# Patient Record
Sex: MALE | Race: WHITE | HISPANIC OR LATINO | Employment: FULL TIME | URBAN - METROPOLITAN AREA
[De-identification: names, ages, dates, MRNs, and addresses within clinical notes are randomized per-mention and may not be internally consistent; named-entity substitution may affect disease eponyms.]

---

## 2017-02-15 ENCOUNTER — HOSPITAL ENCOUNTER (OUTPATIENT)
Dept: LAB | Facility: MEDICAL CENTER | Age: 27
End: 2017-02-15
Attending: FAMILY MEDICINE
Payer: COMMERCIAL

## 2017-02-15 LAB
ALBUMIN SERPL BCP-MCNC: 4.3 G/DL (ref 3.2–4.9)
ALBUMIN/GLOB SERPL: 1.1 G/DL
ALP SERPL-CCNC: 128 U/L (ref 30–99)
ALT SERPL-CCNC: 166 U/L (ref 2–50)
ANION GAP SERPL CALC-SCNC: 11 MMOL/L (ref 0–11.9)
AST SERPL-CCNC: 255 U/L (ref 12–45)
BASOPHILS # BLD AUTO: 0.07 K/UL (ref 0–0.12)
BASOPHILS NFR BLD AUTO: 0.9 % (ref 0–1.8)
BILIRUB SERPL-MCNC: 1.2 MG/DL (ref 0.1–1.5)
BUN SERPL-MCNC: 5 MG/DL (ref 8–22)
CALCIUM SERPL-MCNC: 10 MG/DL (ref 8.5–10.5)
CHLORIDE SERPL-SCNC: 101 MMOL/L (ref 96–112)
CHOLEST SERPL-MCNC: 171 MG/DL (ref 100–199)
CO2 SERPL-SCNC: 26 MMOL/L (ref 20–33)
CREAT SERPL-MCNC: 0.55 MG/DL (ref 0.5–1.4)
CREAT UR-MCNC: 156.6 MG/DL
EOSINOPHIL # BLD: 0.17 K/UL (ref 0–0.51)
EOSINOPHIL NFR BLD AUTO: 2.1 % (ref 0–6.9)
ERYTHROCYTE [DISTWIDTH] IN BLOOD BY AUTOMATED COUNT: 49.3 FL (ref 35.9–50)
EST. AVERAGE GLUCOSE BLD GHB EST-MCNC: 312 MG/DL
GLOBULIN SER CALC-MCNC: 4 G/DL (ref 1.9–3.5)
GLUCOSE SERPL-MCNC: 185 MG/DL (ref 65–99)
HBA1C MFR BLD: 12.5 % (ref 0–5.6)
HCT VFR BLD AUTO: 48.4 % (ref 42–52)
HDLC SERPL-MCNC: 20 MG/DL
HGB BLD-MCNC: 15.9 G/DL (ref 14–18)
IMM GRANULOCYTES # BLD AUTO: 0.02 K/UL (ref 0–0.11)
IMM GRANULOCYTES NFR BLD AUTO: 0.3 % (ref 0–0.9)
LDLC SERPL CALC-MCNC: 104 MG/DL
LYMPHOCYTES # BLD: 2.86 K/UL (ref 1–4.8)
LYMPHOCYTES NFR BLD AUTO: 35.8 % (ref 22–41)
MCH RBC QN AUTO: 31.8 PG (ref 27–33)
MCHC RBC AUTO-ENTMCNC: 32.9 G/DL (ref 33.7–35.3)
MCV RBC AUTO: 96.8 FL (ref 81.4–97.8)
MICROALBUMIN UR-MCNC: 6 MG/DL
MICROALBUMIN/CREAT UR: 38 MG/G (ref 0–30)
MONOCYTES # BLD: 0.42 K/UL (ref 0–0.85)
MONOCYTES NFR BLD AUTO: 5.3 % (ref 0–13.4)
NEUTROPHILS # BLD: 4.45 K/UL (ref 1.82–7.42)
NEUTROPHILS NFR BLD AUTO: 55.6 % (ref 44–72)
NRBC # BLD AUTO: 0 K/UL
NRBC BLD-RTO: 0 /100 WBC
PLATELET # BLD AUTO: 180 K/UL (ref 164–446)
PMV BLD AUTO: 12.7 FL (ref 9–12.9)
POTASSIUM SERPL-SCNC: 4.2 MMOL/L (ref 3.6–5.5)
PROT SERPL-MCNC: 8.3 G/DL (ref 6–8.2)
RBC # BLD AUTO: 5 M/UL (ref 4.7–6.1)
SODIUM SERPL-SCNC: 138 MMOL/L (ref 135–145)
TRIGL SERPL-MCNC: 236 MG/DL (ref 0–149)
WBC # BLD AUTO: 8 K/UL (ref 4.8–10.8)

## 2017-02-15 PROCEDURE — 82570 ASSAY OF URINE CREATININE: CPT

## 2017-02-15 PROCEDURE — 82043 UR ALBUMIN QUANTITATIVE: CPT

## 2017-02-15 PROCEDURE — 80061 LIPID PANEL: CPT

## 2017-02-15 PROCEDURE — 85025 COMPLETE CBC W/AUTO DIFF WBC: CPT

## 2017-02-15 PROCEDURE — 80053 COMPREHEN METABOLIC PANEL: CPT

## 2017-02-15 PROCEDURE — 83036 HEMOGLOBIN GLYCOSYLATED A1C: CPT

## 2017-02-15 PROCEDURE — 36415 COLL VENOUS BLD VENIPUNCTURE: CPT

## 2017-08-29 ENCOUNTER — OFFICE VISIT (OUTPATIENT)
Dept: URGENT CARE | Facility: PHYSICIAN GROUP | Age: 27
End: 2017-08-29
Payer: COMMERCIAL

## 2017-08-29 VITALS
WEIGHT: 250 LBS | TEMPERATURE: 97.9 F | SYSTOLIC BLOOD PRESSURE: 168 MMHG | DIASTOLIC BLOOD PRESSURE: 94 MMHG | OXYGEN SATURATION: 98 % | RESPIRATION RATE: 18 BRPM | HEART RATE: 120 BPM | HEIGHT: 71 IN | BODY MASS INDEX: 35 KG/M2

## 2017-08-29 DIAGNOSIS — R00.0 SINUS TACHYCARDIA: ICD-10-CM

## 2017-08-29 DIAGNOSIS — G62.9 NEUROPATHY: ICD-10-CM

## 2017-08-29 DIAGNOSIS — I10 ESSENTIAL HYPERTENSION: ICD-10-CM

## 2017-08-29 DIAGNOSIS — E11.65 TYPE 2 DIABETES MELLITUS WITH HYPERGLYCEMIA, WITHOUT LONG-TERM CURRENT USE OF INSULIN (HCC): ICD-10-CM

## 2017-08-29 PROCEDURE — 99204 OFFICE O/P NEW MOD 45 MIN: CPT | Performed by: FAMILY MEDICINE

## 2017-08-29 RX ORDER — LISINOPRIL 10 MG/1
10 TABLET ORAL DAILY
Qty: 30 TAB | Refills: 0 | Status: SHIPPED | OUTPATIENT
Start: 2017-08-29 | End: 2018-03-09

## 2017-08-29 NOTE — LETTER
August 29, 2017         Patient: August Samuel   YOB: 1990   Date of Visit: 8/29/2017           To Whom it May Concern:    August Samuel was seen in my clinic on 8/29/2017.       Please excuse him for his absence 8/29-8/30.    If you have any questions or concerns, please don't hesitate to call.        Sincerely,           Venkata Saldana M.D.  Electronically Signed

## 2017-08-30 ENCOUNTER — HOSPITAL ENCOUNTER (OUTPATIENT)
Dept: LAB | Facility: MEDICAL CENTER | Age: 27
End: 2017-08-30
Attending: FAMILY MEDICINE
Payer: COMMERCIAL

## 2017-08-30 DIAGNOSIS — G62.9 NEUROPATHY: ICD-10-CM

## 2017-08-30 LAB
ALBUMIN SERPL BCP-MCNC: 3.1 G/DL (ref 3.2–4.9)
ALBUMIN/GLOB SERPL: 0.6 G/DL
ALP SERPL-CCNC: 179 U/L (ref 30–99)
ALT SERPL-CCNC: 29 U/L (ref 2–50)
ANION GAP SERPL CALC-SCNC: 10 MMOL/L (ref 0–11.9)
AST SERPL-CCNC: 161 U/L (ref 12–45)
BASOPHILS # BLD AUTO: 0.8 % (ref 0–1.8)
BASOPHILS # BLD: 0.09 K/UL (ref 0–0.12)
BILIRUB SERPL-MCNC: 3.7 MG/DL (ref 0.1–1.5)
BUN SERPL-MCNC: 3 MG/DL (ref 8–22)
CALCIUM SERPL-MCNC: 9 MG/DL (ref 8.5–10.5)
CHLORIDE SERPL-SCNC: 97 MMOL/L (ref 96–112)
CHOLEST SERPL-MCNC: 151 MG/DL (ref 100–199)
CO2 SERPL-SCNC: 27 MMOL/L (ref 20–33)
CREAT SERPL-MCNC: 0.49 MG/DL (ref 0.5–1.4)
EOSINOPHIL # BLD AUTO: 0.16 K/UL (ref 0–0.51)
EOSINOPHIL NFR BLD: 1.4 % (ref 0–6.9)
ERYTHROCYTE [DISTWIDTH] IN BLOOD BY AUTOMATED COUNT: 53.5 FL (ref 35.9–50)
EST. AVERAGE GLUCOSE BLD GHB EST-MCNC: 194 MG/DL
ETHANOL BLD-MCNC: 0.01 G/DL
GFR SERPL CREATININE-BSD FRML MDRD: >60 ML/MIN/1.73 M 2
GLOBULIN SER CALC-MCNC: 5.4 G/DL (ref 1.9–3.5)
GLUCOSE SERPL-MCNC: 139 MG/DL (ref 65–99)
HBA1C MFR BLD: 8.4 % (ref 0–5.6)
HCT VFR BLD AUTO: 39.7 % (ref 42–52)
HDLC SERPL-MCNC: 6 MG/DL
HGB BLD-MCNC: 13.5 G/DL (ref 14–18)
IMM GRANULOCYTES # BLD AUTO: 0.06 K/UL (ref 0–0.11)
IMM GRANULOCYTES NFR BLD AUTO: 0.5 % (ref 0–0.9)
LDLC SERPL CALC-MCNC: 86 MG/DL
LYMPHOCYTES # BLD AUTO: 3.13 K/UL (ref 1–4.8)
LYMPHOCYTES NFR BLD: 26.6 % (ref 22–41)
MCH RBC QN AUTO: 32.8 PG (ref 27–33)
MCHC RBC AUTO-ENTMCNC: 34 G/DL (ref 33.7–35.3)
MCV RBC AUTO: 96.4 FL (ref 81.4–97.8)
MONOCYTES # BLD AUTO: 0.64 K/UL (ref 0–0.85)
MONOCYTES NFR BLD AUTO: 5.4 % (ref 0–13.4)
NEUTROPHILS # BLD AUTO: 7.69 K/UL (ref 1.82–7.42)
NEUTROPHILS NFR BLD: 65.3 % (ref 44–72)
NRBC # BLD AUTO: 0 K/UL
NRBC BLD AUTO-RTO: 0 /100 WBC
PLATELET # BLD AUTO: 176 K/UL (ref 164–446)
PMV BLD AUTO: 11.9 FL (ref 9–12.9)
POTASSIUM SERPL-SCNC: 3.3 MMOL/L (ref 3.6–5.5)
PROT SERPL-MCNC: 8.5 G/DL (ref 6–8.2)
RBC # BLD AUTO: 4.12 M/UL (ref 4.7–6.1)
SODIUM SERPL-SCNC: 134 MMOL/L (ref 135–145)
TRIGL SERPL-MCNC: 294 MG/DL (ref 0–149)
WBC # BLD AUTO: 11.8 K/UL (ref 4.8–10.8)

## 2017-08-30 PROCEDURE — 83519 RIA NONANTIBODY: CPT

## 2017-08-30 PROCEDURE — 36415 COLL VENOUS BLD VENIPUNCTURE: CPT

## 2017-08-30 PROCEDURE — 83036 HEMOGLOBIN GLYCOSYLATED A1C: CPT

## 2017-08-30 PROCEDURE — 80307 DRUG TEST PRSMV CHEM ANLYZR: CPT

## 2017-08-30 PROCEDURE — 85025 COMPLETE CBC W/AUTO DIFF WBC: CPT

## 2017-08-30 PROCEDURE — 80061 LIPID PANEL: CPT

## 2017-08-30 PROCEDURE — 83516 IMMUNOASSAY NONANTIBODY: CPT

## 2017-08-30 PROCEDURE — 80053 COMPREHEN METABOLIC PANEL: CPT

## 2017-08-30 NOTE — PROGRESS NOTES
"Chief Complaint   Patient presents with   • Leg Problem     x1wk has been feeling numbness in both legs/bursts of sharp pain         HPI     has several nonspecific complaints:        C/o severe fatigue.    States that he sleeps ok.          C/o intermittent numbness and tingling in both legs and feet x 3 wks.   Feels that legs sometimes, \"lack power\".    Gets occasional pain down front of thighs, but this is mild.         Past med hx:   DMII - but not compliant with meds.       Social hx - denies tobacco.   + 5-8 drinks daily x3 yrs.   Denies drugs      Family history was reviewed and not pertinent         Blood pressure (!) 168/94, pulse (!) 120, temperature 36.6 °C (97.9 °F), resp. rate 18, height 1.803 m (5' 11\"), weight 113.4 kg (250 lb), SpO2 98 %.        Gen: Alert and oriented, No apparent distress.   HEENT - PERRLA, EOMI  Neuro - alert and oriented x3. CN 2-12 grossly intact.  Lungs - CTA. No wheezes, rhonchi or rales.  Heart - regular rate and rhythm without murmur.  Abdomen - soft and non-tender, bowel sounds active x4.  Musculoskeletal - No lower extremity edema noted.  Naida's sign negative bilaterally  Psych - normal speech and affect  Ext: No clubbing, cyanosis, edema.       EKG interpretation -  sinus tachy -120.  No ST or QRS morphology changes. TWI in inf leads.   QT interval is prolonged. Axis is positive. No signs of ischemia.      A/P:      1.  ETOH abuse - likely cause for his neuropathy.  Advised to cut down.   He does not want professional help.       2.  DMII - will restart on metformin .   A1c 8.4    3.  HTN - will start on lisinopril .   F/u one week.     5.  EKG shows sinus tach.   Will refer to cardiology    Pt is refusing to go to ED.  "

## 2017-08-30 NOTE — PROGRESS NOTES
"Subjective:      August Samuel is a 27 y.o. male who presents with Leg Problem (x1wk has been feeling numbness in both legs/bursts of sharp pain)            HPI    ROS       Objective:     BP (!) 168/94   Pulse (!) 120   Temp 36.6 °C (97.9 °F)   Resp 18   Ht 1.803 m (5' 11\")   Wt 113.4 kg (250 lb)   SpO2 98%   BMI 34.87 kg/m²      Physical Exam            Assessment/Plan:     There are no diagnoses linked to this encounter.      "

## 2017-09-08 ENCOUNTER — OFFICE VISIT (OUTPATIENT)
Dept: CARDIOLOGY | Facility: MEDICAL CENTER | Age: 27
End: 2017-09-08
Payer: COMMERCIAL

## 2017-09-08 VITALS
WEIGHT: 228 LBS | HEART RATE: 116 BPM | HEIGHT: 71 IN | OXYGEN SATURATION: 96 % | DIASTOLIC BLOOD PRESSURE: 100 MMHG | SYSTOLIC BLOOD PRESSURE: 130 MMHG | BODY MASS INDEX: 31.92 KG/M2

## 2017-09-08 DIAGNOSIS — E78.5 OTHER AND UNSPECIFIED HYPERLIPIDEMIA: ICD-10-CM

## 2017-09-08 DIAGNOSIS — E11.65 UNCONTROLLED TYPE 2 DIABETES MELLITUS WITH COMPLICATION, UNSPECIFIED LONG TERM INSULIN USE STATUS: ICD-10-CM

## 2017-09-08 DIAGNOSIS — R00.0 SINUS TACHYCARDIA: ICD-10-CM

## 2017-09-08 DIAGNOSIS — E11.8 UNCONTROLLED TYPE 2 DIABETES MELLITUS WITH COMPLICATION, UNSPECIFIED LONG TERM INSULIN USE STATUS: ICD-10-CM

## 2017-09-08 DIAGNOSIS — G62.1 ALCOHOL-INDUCED POLYNEUROPATHY (HCC): ICD-10-CM

## 2017-09-08 LAB — EKG IMPRESSION: NORMAL

## 2017-09-08 PROCEDURE — 99204 OFFICE O/P NEW MOD 45 MIN: CPT | Performed by: INTERNAL MEDICINE

## 2017-09-08 PROCEDURE — 93000 ELECTROCARDIOGRAM COMPLETE: CPT | Performed by: INTERNAL MEDICINE

## 2017-09-08 RX ORDER — ATORVASTATIN CALCIUM 20 MG/1
20 TABLET, FILM COATED ORAL DAILY
Qty: 30 TAB | Refills: 3 | Status: SHIPPED | OUTPATIENT
Start: 2017-09-08 | End: 2019-03-12

## 2017-09-08 NOTE — PROGRESS NOTES
"Arrhythmia Clinic Note (New patient)     DOS: 9/8/2017    Referring physician: Tyrell Barnes MD    Chief complaint/Reason for consult: \"tachycardia\"    HPI:  Patient is a 28 yo WM with history of recently diagnosed T2DM who admittedly has been non-compliant with his metformin who was referred to cardiology for sinus tachycardia incidentally seen on 12 lead EKG. He denies any symptoms of palpitations/chest discomfort/RATLIFF/edema/PND/orthopnea. He otherwise mainly complains of peripheral neuropathy that has been bothering his feet. He does admit to drinking heavily, his drink of choice being canned \"long islands\" of which he admits to drinking 6 x per day.     ROS (+ highlighted in red):  Constitutional: Fevers/chills/fatigue/weightloss  HEENT: Blurry vision/eye pain/sore throat/hearing loss  Respiratory: Shortness of breath/cough  Cardiovascular: Chest pain/palpitations/edema/orthopnea/syncope  GI: Nausea/vomitting/diarrhea  MSK: Arthralgias/myagias/muscle weakness  Skin: Rash/sores  Neurological: Numbness/tremors/vertigo  Endocrine: Excessive thirst/polyuria/cold intolerance/heat intolerance  Psych: Depression/anxiety    PMhx:  T2DM  EtOH dependency  Medication non-compliance    No past surgical history on file.    Social History     Social History   • Marital status: Single     Spouse name: N/A   • Number of children: N/A   • Years of education: N/A     Occupational History   • Not on file.     Social History Main Topics   • Smoking status: Never Smoker   • Smokeless tobacco: Never Used   • Alcohol use Not on file   • Drug use: Unknown   • Sexual activity: Not on file     Other Topics Concern   • Not on file     Social History Narrative   • No narrative on file       History reviewed. No pertinent family history.    Not on File    Current Outpatient Prescriptions   Medication Sig Dispense Refill   • metformin (GLUCOPHAGE) 500 MG Tab Take 2 Tabs by mouth 2 times a day, with meals. 60 Tab 0   • lisinopril (PRINIVIL) " "10 MG Tab Take 1 Tab by mouth every day. 30 Tab 0     No current facility-administered medications for this visit.        Physical Exam:  Vitals:    09/08/17 0755   BP: 130/100   Pulse: (!) 116   SpO2: 96%   Weight: 103.4 kg (228 lb)   Height: 1.803 m (5' 10.98\")     General appearance: NAD, conversant   Eyes: anicteric sclerae, moist conjunctivae; no lid-lag; PERRLA  HENT: Atraumatic; oropharynx clear with moist mucous membranes and no mucosal ulcerations; normal hard and soft palate  Neck: Trachea midline; FROM, supple, no thyromegaly or lymphadenopathy  Lungs: CTA, with normal respiratory effort and no intercostal retractions  CV: RRR, no MRGs, no JVD   Abdomen: Soft, non-tender; no masses or HSM  Extremities: No peripheral edema or extremity lymphadenopathy  Skin: Normal temperature, turgor and texture; no rash, ulcers or subcutaneous nodules  Psych: Appropriate affect, alert and oriented to person, place and time    Data:  Labs reviewed    Outside FM records reviewed    EKG interpreted by me:  Sinus tachycardia    Impression/Plan:  1. Sinus tachycardia  EKG   2. Uncontrolled type 2 diabetes mellitus with complication, unspecified long term insulin use status (CMS-[facility])     3. Alcohol-induced polyneuropathy (CMS-[facility])     4. Other and unspecified hyperlipidemia       -His EKG shows sinus tachycardia, no indication of arrhythmia or concern for ischemia  -There is no further work up needed for a sinus rhythm  -Given his diabetes, reasonable to start moderate dose statin for him  -I counseled him on weight loss and alcohol cessation  -He can f/u PRN    Sushma Reynolds MD    "

## 2017-09-21 LAB
ACHR BIND AB SER-SCNC: 0.2 NMOL/L (ref 0–0.4)
ACHR BLOCK AB/ACHR TOTAL SFR SER: 3 % (ref 0–26)

## 2017-10-09 ENCOUNTER — OFFICE VISIT (OUTPATIENT)
Dept: URGENT CARE | Facility: PHYSICIAN GROUP | Age: 27
End: 2017-10-09
Payer: COMMERCIAL

## 2017-10-09 ENCOUNTER — HOSPITAL ENCOUNTER (INPATIENT)
Facility: MEDICAL CENTER | Age: 27
LOS: 3 days | DRG: 432 | End: 2017-10-12
Attending: EMERGENCY MEDICINE | Admitting: HOSPITALIST
Payer: COMMERCIAL

## 2017-10-09 ENCOUNTER — RESOLUTE PROFESSIONAL BILLING HOSPITAL PROF FEE (OUTPATIENT)
Dept: HOSPITALIST | Facility: MEDICAL CENTER | Age: 27
End: 2017-10-09
Payer: COMMERCIAL

## 2017-10-09 VITALS
OXYGEN SATURATION: 97 % | HEIGHT: 71 IN | SYSTOLIC BLOOD PRESSURE: 140 MMHG | RESPIRATION RATE: 16 BRPM | TEMPERATURE: 98.8 F | BODY MASS INDEX: 31.92 KG/M2 | WEIGHT: 228 LBS | HEART RATE: 123 BPM | DIASTOLIC BLOOD PRESSURE: 82 MMHG

## 2017-10-09 DIAGNOSIS — R10.84 GENERALIZED ABDOMINAL PAIN: ICD-10-CM

## 2017-10-09 DIAGNOSIS — E87.1 HYPONATREMIA: ICD-10-CM

## 2017-10-09 DIAGNOSIS — K92.0 HEMATEMESIS WITH NAUSEA: ICD-10-CM

## 2017-10-09 DIAGNOSIS — K92.2 GASTROINTESTINAL HEMORRHAGE, UNSPECIFIED GASTROINTESTINAL HEMORRHAGE TYPE: ICD-10-CM

## 2017-10-09 DIAGNOSIS — Z78.9 ALCOHOL USE: ICD-10-CM

## 2017-10-09 DIAGNOSIS — K92.1 MELENA: ICD-10-CM

## 2017-10-09 LAB
ALBUMIN SERPL BCP-MCNC: 3.4 G/DL (ref 3.2–4.9)
ALBUMIN/GLOB SERPL: 0.6 G/DL
ALP SERPL-CCNC: 198 U/L (ref 30–99)
ALT SERPL-CCNC: 45 U/L (ref 2–50)
ANION GAP SERPL CALC-SCNC: 12 MMOL/L (ref 0–11.9)
AST SERPL-CCNC: 161 U/L (ref 12–45)
BASOPHILS # BLD AUTO: 0.7 % (ref 0–1.8)
BASOPHILS # BLD: 0.09 K/UL (ref 0–0.12)
BILIRUB SERPL-MCNC: 2.9 MG/DL (ref 0.1–1.5)
BUN SERPL-MCNC: 9 MG/DL (ref 8–22)
CALCIUM SERPL-MCNC: 9.6 MG/DL (ref 8.5–10.5)
CHLORIDE SERPL-SCNC: 98 MMOL/L (ref 96–112)
CO2 SERPL-SCNC: 24 MMOL/L (ref 20–33)
CREAT SERPL-MCNC: 0.51 MG/DL (ref 0.5–1.4)
EKG IMPRESSION: NORMAL
EOSINOPHIL # BLD AUTO: 0.13 K/UL (ref 0–0.51)
EOSINOPHIL NFR BLD: 1 % (ref 0–6.9)
ERYTHROCYTE [DISTWIDTH] IN BLOOD BY AUTOMATED COUNT: 57.9 FL (ref 35.9–50)
GFR SERPL CREATININE-BSD FRML MDRD: >60 ML/MIN/1.73 M 2
GLOBULIN SER CALC-MCNC: 5.6 G/DL (ref 1.9–3.5)
GLUCOSE BLD-MCNC: 133 MG/DL (ref 65–99)
GLUCOSE SERPL-MCNC: 138 MG/DL (ref 65–99)
HCT VFR BLD AUTO: 40.5 % (ref 42–52)
HGB BLD-MCNC: 13.8 G/DL (ref 14–18)
IMM GRANULOCYTES # BLD AUTO: 0.05 K/UL (ref 0–0.11)
IMM GRANULOCYTES NFR BLD AUTO: 0.4 % (ref 0–0.9)
LIPASE SERPL-CCNC: 16 U/L (ref 11–82)
LYMPHOCYTES # BLD AUTO: 3.23 K/UL (ref 1–4.8)
LYMPHOCYTES NFR BLD: 24.4 % (ref 22–41)
MCH RBC QN AUTO: 32.1 PG (ref 27–33)
MCHC RBC AUTO-ENTMCNC: 34.1 G/DL (ref 33.7–35.3)
MCV RBC AUTO: 94.2 FL (ref 81.4–97.8)
MONOCYTES # BLD AUTO: 0.58 K/UL (ref 0–0.85)
MONOCYTES NFR BLD AUTO: 4.4 % (ref 0–13.4)
NEUTROPHILS # BLD AUTO: 9.17 K/UL (ref 1.82–7.42)
NEUTROPHILS NFR BLD: 69.1 % (ref 44–72)
NRBC # BLD AUTO: 0 K/UL
NRBC BLD AUTO-RTO: 0 /100 WBC
PLATELET # BLD AUTO: 222 K/UL (ref 164–446)
PMV BLD AUTO: 11.2 FL (ref 9–12.9)
POTASSIUM SERPL-SCNC: 3.8 MMOL/L (ref 3.6–5.5)
PROT SERPL-MCNC: 9 G/DL (ref 6–8.2)
RBC # BLD AUTO: 4.3 M/UL (ref 4.7–6.1)
SODIUM SERPL-SCNC: 134 MMOL/L (ref 135–145)
WBC # BLD AUTO: 13.3 K/UL (ref 4.8–10.8)

## 2017-10-09 PROCEDURE — 99285 EMERGENCY DEPT VISIT HI MDM: CPT

## 2017-10-09 PROCEDURE — 96365 THER/PROPH/DIAG IV INF INIT: CPT

## 2017-10-09 PROCEDURE — 94760 N-INVAS EAR/PLS OXIMETRY 1: CPT

## 2017-10-09 PROCEDURE — 96367 TX/PROPH/DG ADDL SEQ IV INF: CPT

## 2017-10-09 PROCEDURE — 36415 COLL VENOUS BLD VENIPUNCTURE: CPT

## 2017-10-09 PROCEDURE — 80053 COMPREHEN METABOLIC PANEL: CPT

## 2017-10-09 PROCEDURE — 96368 THER/DIAG CONCURRENT INF: CPT

## 2017-10-09 PROCEDURE — 99214 OFFICE O/P EST MOD 30 MIN: CPT | Performed by: PHYSICIAN ASSISTANT

## 2017-10-09 PROCEDURE — 700101 HCHG RX REV CODE 250: Performed by: INTERNAL MEDICINE

## 2017-10-09 PROCEDURE — C9113 INJ PANTOPRAZOLE SODIUM, VIA: HCPCS | Performed by: EMERGENCY MEDICINE

## 2017-10-09 PROCEDURE — 83690 ASSAY OF LIPASE: CPT

## 2017-10-09 PROCEDURE — 700111 HCHG RX REV CODE 636 W/ 250 OVERRIDE (IP): Performed by: EMERGENCY MEDICINE

## 2017-10-09 PROCEDURE — 93005 ELECTROCARDIOGRAM TRACING: CPT | Performed by: STUDENT IN AN ORGANIZED HEALTH CARE EDUCATION/TRAINING PROGRAM

## 2017-10-09 PROCEDURE — 93010 ELECTROCARDIOGRAM REPORT: CPT | Performed by: INTERNAL MEDICINE

## 2017-10-09 PROCEDURE — 96375 TX/PRO/DX INJ NEW DRUG ADDON: CPT

## 2017-10-09 PROCEDURE — A9270 NON-COVERED ITEM OR SERVICE: HCPCS | Performed by: STUDENT IN AN ORGANIZED HEALTH CARE EDUCATION/TRAINING PROGRAM

## 2017-10-09 PROCEDURE — 770020 HCHG ROOM/CARE - TELE (206)

## 2017-10-09 PROCEDURE — 700111 HCHG RX REV CODE 636 W/ 250 OVERRIDE (IP): Performed by: INTERNAL MEDICINE

## 2017-10-09 PROCEDURE — 700102 HCHG RX REV CODE 250 W/ 637 OVERRIDE(OP): Performed by: STUDENT IN AN ORGANIZED HEALTH CARE EDUCATION/TRAINING PROGRAM

## 2017-10-09 PROCEDURE — 82962 GLUCOSE BLOOD TEST: CPT

## 2017-10-09 PROCEDURE — 96361 HYDRATE IV INFUSION ADD-ON: CPT

## 2017-10-09 PROCEDURE — 700105 HCHG RX REV CODE 258: Performed by: STUDENT IN AN ORGANIZED HEALTH CARE EDUCATION/TRAINING PROGRAM

## 2017-10-09 PROCEDURE — 700105 HCHG RX REV CODE 258: Performed by: INTERNAL MEDICINE

## 2017-10-09 PROCEDURE — 700105 HCHG RX REV CODE 258: Performed by: EMERGENCY MEDICINE

## 2017-10-09 PROCEDURE — 85025 COMPLETE CBC W/AUTO DIFF WBC: CPT

## 2017-10-09 RX ORDER — OCTREOTIDE ACETATE 100 UG/ML
50 INJECTION, SOLUTION INTRAVENOUS; SUBCUTANEOUS ONCE
Status: DISCONTINUED | OUTPATIENT
Start: 2017-10-09 | End: 2017-10-09

## 2017-10-09 RX ORDER — ATORVASTATIN CALCIUM 20 MG/1
20 TABLET, FILM COATED ORAL DAILY
Status: DISCONTINUED | OUTPATIENT
Start: 2017-10-09 | End: 2017-10-12 | Stop reason: HOSPADM

## 2017-10-09 RX ORDER — DEXTROSE MONOHYDRATE 25 G/50ML
25 INJECTION, SOLUTION INTRAVENOUS
Status: CANCELLED | OUTPATIENT
Start: 2017-10-09

## 2017-10-09 RX ORDER — PANTOPRAZOLE SODIUM 40 MG/10ML
40 INJECTION, POWDER, LYOPHILIZED, FOR SOLUTION INTRAVENOUS 2 TIMES DAILY
Status: DISCONTINUED | OUTPATIENT
Start: 2017-10-10 | End: 2017-10-10 | Stop reason: ALTCHOICE

## 2017-10-09 RX ORDER — SODIUM CHLORIDE 9 MG/ML
INJECTION, SOLUTION INTRAVENOUS CONTINUOUS
Status: DISCONTINUED | OUTPATIENT
Start: 2017-10-09 | End: 2017-10-11

## 2017-10-09 RX ORDER — ONDANSETRON 2 MG/ML
4 INJECTION INTRAMUSCULAR; INTRAVENOUS ONCE
Status: COMPLETED | OUTPATIENT
Start: 2017-10-09 | End: 2017-10-09

## 2017-10-09 RX ORDER — SODIUM CHLORIDE 9 MG/ML
1000 INJECTION, SOLUTION INTRAVENOUS ONCE
Status: COMPLETED | OUTPATIENT
Start: 2017-10-09 | End: 2017-10-09

## 2017-10-09 RX ORDER — ONDANSETRON 4 MG/1
4 TABLET, ORALLY DISINTEGRATING ORAL ONCE
Status: COMPLETED | OUTPATIENT
Start: 2017-10-09 | End: 2017-10-09

## 2017-10-09 RX ORDER — PANTOPRAZOLE SODIUM 40 MG/10ML
80 INJECTION, POWDER, LYOPHILIZED, FOR SOLUTION INTRAVENOUS ONCE
Status: COMPLETED | OUTPATIENT
Start: 2017-10-09 | End: 2017-10-09

## 2017-10-09 RX ORDER — LISINOPRIL 10 MG/1
10 TABLET ORAL DAILY
Status: DISCONTINUED | OUTPATIENT
Start: 2017-10-09 | End: 2017-10-10

## 2017-10-09 RX ADMIN — ATORVASTATIN CALCIUM 20 MG: 20 TABLET, FILM COATED ORAL at 22:20

## 2017-10-09 RX ADMIN — LISINOPRIL 10 MG: 10 TABLET ORAL at 22:19

## 2017-10-09 RX ADMIN — PANTOPRAZOLE SODIUM 80 MG: 40 INJECTION, POWDER, FOR SOLUTION INTRAVENOUS at 19:38

## 2017-10-09 RX ADMIN — ONDANSETRON 4 MG: 2 INJECTION, SOLUTION INTRAMUSCULAR; INTRAVENOUS at 18:51

## 2017-10-09 RX ADMIN — FOLIC ACID 1 MG: 5 INJECTION, SOLUTION INTRAMUSCULAR; INTRAVENOUS; SUBCUTANEOUS at 20:56

## 2017-10-09 RX ADMIN — THIAMINE HYDROCHLORIDE 100 MG: 100 INJECTION, SOLUTION INTRAMUSCULAR; INTRAVENOUS at 20:57

## 2017-10-09 RX ADMIN — SODIUM CHLORIDE: 9 INJECTION, SOLUTION INTRAVENOUS at 22:21

## 2017-10-09 RX ADMIN — SODIUM CHLORIDE 8 MG/HR: 9 INJECTION, SOLUTION INTRAVENOUS at 19:39

## 2017-10-09 RX ADMIN — ONDANSETRON 4 MG: 4 TABLET, ORALLY DISINTEGRATING ORAL at 15:19

## 2017-10-09 RX ADMIN — SODIUM CHLORIDE 1000 ML: 9 INJECTION, SOLUTION INTRAVENOUS at 18:44

## 2017-10-09 ASSESSMENT — PAIN SCALES - GENERAL
PAINLEVEL_OUTOF10: 0
PAINLEVEL_OUTOF10: 0
PAINLEVEL_OUTOF10: 4
PAINLEVEL: 4=SLIGHT-MODERATE PAIN

## 2017-10-09 ASSESSMENT — ENCOUNTER SYMPTOMS
DIARRHEA: 1
NECK PAIN: 0
ABDOMINAL PAIN: 1
HEADACHES: 0
WHEEZING: 0
SORE THROAT: 0
CONSTIPATION: 0
VOMITING: 1
MYALGIAS: 0
EYE DISCHARGE: 0
CHILLS: 1
TINGLING: 0
ARTHRALGIAS: 0
COUGH: 0
HEARTBURN: 0
EYE REDNESS: 0
HEMATOCHEZIA: 0
DIZZINESS: 1
BELCHING: 0
BLOOD IN STOOL: 1
FEVER: 0
ANOREXIA: 1
SENSORY CHANGE: 1
NAUSEA: 1

## 2017-10-09 ASSESSMENT — COPD QUESTIONNAIRES
DO YOU EVER COUGH UP ANY MUCUS OR PHLEGM?: YES, A FEW DAYS A WEEK OR MONTH
DURING THE PAST 4 WEEKS HOW MUCH DID YOU FEEL SHORT OF BREATH: SOME OF THE TIME
HAVE YOU SMOKED AT LEAST 100 CIGARETTES IN YOUR ENTIRE LIFE: NO/DON'T KNOW
COPD SCREENING SCORE: 2

## 2017-10-09 ASSESSMENT — PATIENT HEALTH QUESTIONNAIRE - PHQ9
7. TROUBLE CONCENTRATING ON THINGS, SUCH AS READING THE NEWSPAPER OR WATCHING TELEVISION: SEVERAL DAYS
6. FEELING BAD ABOUT YOURSELF - OR THAT YOU ARE A FAILURE OR HAVE LET YOURSELF OR YOUR FAMILY DOWN: NEARLY EVERY DAY
8. MOVING OR SPEAKING SO SLOWLY THAT OTHER PEOPLE COULD HAVE NOTICED. OR THE OPPOSITE, BEING SO FIGETY OR RESTLESS THAT YOU HAVE BEEN MOVING AROUND A LOT MORE THAN USUAL: NOT AT ALL
2. FEELING DOWN, DEPRESSED, IRRITABLE, OR HOPELESS: SEVERAL DAYS
4. FEELING TIRED OR HAVING LITTLE ENERGY: MORE THAN HALF THE DAYS
9. THOUGHTS THAT YOU WOULD BE BETTER OFF DEAD, OR OF HURTING YOURSELF: NOT AT ALL
3. TROUBLE FALLING OR STAYING ASLEEP OR SLEEPING TOO MUCH: NOT AT ALL
SUM OF ALL RESPONSES TO PHQ9 QUESTIONS 1 AND 2: 3
5. POOR APPETITE OR OVEREATING: NEARLY EVERY DAY
SUM OF ALL RESPONSES TO PHQ QUESTIONS 1-9: 12
1. LITTLE INTEREST OR PLEASURE IN DOING THINGS: MORE THAN HALF THE DAYS

## 2017-10-09 ASSESSMENT — LIFESTYLE VARIABLES
VISUAL DISTURBANCES: NOT PRESENT
ALCOHOL_USE: YES
EVER_SMOKED: NEVER
TREMOR: TREMOR NOT VISIBLE BUT CAN BE FELT, FINGERTIP TO FINGERTIP
TOTAL SCORE: 4
AUDITORY DISTURBANCES: NOT PRESENT
EVER FELT BAD OR GUILTY ABOUT YOUR DRINKING: YES
DOES PATIENT WANT TO STOP DRINKING: YES
DOES PATIENT WANT TO TALK TO SOMEONE ABOUT QUITTING: YES
CONSUMPTION TOTAL: POSITIVE
HAVE PEOPLE ANNOYED YOU BY CRITICIZING YOUR DRINKING: YES
TOTAL SCORE: 4
NAUSEA AND VOMITING: NO NAUSEA AND NO VOMITING
ANXIETY: NO ANXIETY (AT EASE)
PAROXYSMAL SWEATS: NO SWEAT VISIBLE
AVERAGE NUMBER OF DAYS PER WEEK YOU HAVE A DRINK CONTAINING ALCOHOL: 7
HAVE YOU EVER FELT YOU SHOULD CUT DOWN ON YOUR DRINKING: YES
HEADACHE, FULLNESS IN HEAD: NOT PRESENT
ORIENTATION AND CLOUDING OF SENSORIUM: ORIENTED AND CAN DO SERIAL ADDITIONS
EVER HAD A DRINK FIRST THING IN THE MORNING TO STEADY YOUR NERVES TO GET RID OF A HANGOVER: YES
ON A TYPICAL DAY WHEN YOU DRINK ALCOHOL HOW MANY DRINKS DO YOU HAVE: 6
HOW MANY TIMES IN THE PAST YEAR HAVE YOU HAD 5 OR MORE DRINKS IN A DAY: 365
EVER_SMOKED: NEVER
TOTAL SCORE: 4
TOTAL SCORE: 1
AGITATION: NORMAL ACTIVITY

## 2017-10-09 NOTE — ED NOTES
.  Chief Complaint   Patient presents with   • N/V   • Blood in Sputum   • Diarrhea     tar like stool x 3 today   ambulated to triage with above c/c fsbs 133. Informed of triage process. Awaiting room in triage lobby. Asked to return to triage desk with any questions or concerns.

## 2017-10-09 NOTE — PROGRESS NOTES
"Subjective:      August Samuel is a 27 y.o. male who presents with Abdominal Pain (x 6 hours, vomiting blood, black stool)            PT is 28 y/o male who presents to [facility] with abd. Pain, vomiting and diarrhea since yesterday. Pt. Reports that his abd. Pain started yesterday more \"all over\" and he \"just didn't feel well.   He reports that he think awoke this morning with worse abd. Pain and had since had 4 episodes of bloody vomit, and one episodes of very black diarrhea.   He reports intermittent episodes of light-headedness. He was concerned as he thought that his Lisinopril was a blood thinner- and took some tereza seltzer and thought this was causing his symptoms.   Pt. Does drink significant alcohol- last use was 2 days ago- when he had \"3 long island teas\".       Abdominal Pain   This is a new problem. The current episode started yesterday. The onset quality is gradual. The problem occurs constantly. The problem has been gradually worsening. The pain is located in the LLQ and epigastric region. The pain is at a severity of 5/10. The pain is moderate. The quality of the pain is aching. The abdominal pain does not radiate. Associated symptoms include anorexia, diarrhea, melena, nausea and vomiting. Pertinent negatives include no arthralgias, belching, constipation, dysuria, fever, headaches, hematochezia, hematuria or myalgias. Nothing aggravates the pain. The pain is relieved by nothing. Treatments tried: Tereza Shae last night. The treatment provided no relief.       Review of Systems   Constitutional: Positive for chills and malaise/fatigue. Negative for fever.   HENT: Negative for congestion, ear discharge and sore throat.    Eyes: Negative for discharge and redness.   Respiratory: Negative for cough and wheezing.    Cardiovascular: Negative for chest pain and leg swelling.   Gastrointestinal: Positive for abdominal pain, anorexia, blood in stool, diarrhea, melena, nausea and vomiting. Negative for " "constipation, heartburn and hematochezia.   Genitourinary: Negative for dysuria, hematuria and urgency.   Musculoskeletal: Negative for arthralgias, myalgias and neck pain.   Skin: Negative for itching and rash.   Neurological: Positive for dizziness and sensory change. Negative for tingling and headaches.          Objective:     /82   Pulse (!) 123   Temp 37.1 °C (98.8 °F)   Resp 16   Ht 1.803 m (5' 11\")   Wt 103.4 kg (228 lb)   SpO2 97%   BMI 31.80 kg/m²    PMH:  has no past medical history on file.  MEDS:   Current Outpatient Prescriptions:   •  atorvastatin (LIPITOR) 20 MG Tab, Take 1 Tab by mouth every day., Disp: 30 Tab, Rfl: 3  •  metformin (GLUCOPHAGE) 500 MG Tab, Take 2 Tabs by mouth 2 times a day, with meals., Disp: 60 Tab, Rfl: 0  •  lisinopril (PRINIVIL) 10 MG Tab, Take 1 Tab by mouth every day., Disp: 30 Tab, Rfl: 0    Current Facility-Administered Medications:   •  ondansetron (ZOFRAN ODT) dispertab 4 mg, 4 mg, Oral, Once, Chris Alexander P.AAlejandro-BARBER  ALLERGIES: No Known Allergies  SURGHX: No past surgical history on file.  SOCHX:  reports that he has never smoked. He has never used smokeless tobacco.  FH: Family history was reviewed, no pertinent findings to report    Physical Exam   Constitutional: He is oriented to person, place, and time. He appears well-developed and well-nourished.   HENT:   Head: Normocephalic and atraumatic.   Right Ear: External ear normal.   Left Ear: External ear normal.   Nose: Nose normal.   Mouth/Throat: Oropharynx is clear and moist. No oropharyngeal exudate.   Eyes: EOM are normal. Pupils are equal, round, and reactive to light.   Neck: Normal range of motion. Neck supple.   Cardiovascular: Tachycardia present.    No murmur heard.  Pulmonary/Chest: Effort normal and breath sounds normal. No respiratory distress.   Abdominal: Soft. He exhibits no distension, no ascites and no mass. Bowel sounds are increased. There is tenderness in the epigastric area and left " lower quadrant. There is no rebound, no guarding and no tenderness at McBurney's point. No hernia.       Minimal mid-epigastric tenderness- without rigidity.   Hyperactive bowel sounds. Neg. Heel tap.    Musculoskeletal: He exhibits no edema.   Lymphadenopathy:     He has no cervical adenopathy.   Neurological: He is alert and oriented to person, place, and time.   Skin: Skin is warm. No rash noted. No pallor.   Good skin turgor.      Psychiatric: He has a normal mood and affect. His behavior is normal.   Vitals reviewed.              Assessment/Plan:     1. Hematemesis with nausea  - ondansetron (ZOFRAN ODT) dispertab 4 mg; Take 1 Tab by mouth Once.    2. Melena  3. Alcohol use  4. Generalized abdominal pain    Patient showed me pictures of his vomiting- gross blood noted. Pt. With noted tenderness to epigastrum and LLQ- appears bleed is more upper GI - without BRBPR.   Possibly due to long term alcohol use, pancreatitis, ulcers, ect.   At this time patient is hemodynamically stable- known hx. Of tachycardia- however needs timely work up.   Pt. Was instructed to go to the ER- he declined transport- he called his brother who will drive him to [facility]- I spoke with Dr. Loya regarding this patient and he was gracious to see him.   Pt. Was stable during the duration of his care today. '

## 2017-10-10 PROBLEM — E80.6 HYPERBILIRUBINEMIA: Status: ACTIVE | Noted: 2017-10-10

## 2017-10-10 PROBLEM — R00.0 TACHYCARDIA: Status: ACTIVE | Noted: 2017-10-10

## 2017-10-10 PROBLEM — K76.6 PORTAL HYPERTENSION (HCC): Status: ACTIVE | Noted: 2017-10-10

## 2017-10-10 PROBLEM — I85.01 BLEEDING ESOPHAGEAL VARICES (HCC): Status: ACTIVE | Noted: 2017-10-10

## 2017-10-10 PROBLEM — K70.30 ALCOHOLIC CIRRHOSIS (HCC): Status: ACTIVE | Noted: 2017-10-10

## 2017-10-10 PROBLEM — R79.89 LIVER FUNCTION TEST ABNORMALITY: Status: ACTIVE | Noted: 2017-10-10

## 2017-10-10 PROBLEM — E11.9 DIABETES MELLITUS (HCC): Status: ACTIVE | Noted: 2017-10-10

## 2017-10-10 LAB
ALBUMIN SERPL BCP-MCNC: 2.6 G/DL (ref 3.2–4.9)
ALBUMIN/GLOB SERPL: 0.6 G/DL
ALP SERPL-CCNC: 155 U/L (ref 30–99)
ALT SERPL-CCNC: 33 U/L (ref 2–50)
ANION GAP SERPL CALC-SCNC: 9 MMOL/L (ref 0–11.9)
APPEARANCE UR: CLEAR
AST SERPL-CCNC: 131 U/L (ref 12–45)
BASOPHILS # BLD AUTO: 0.8 % (ref 0–1.8)
BASOPHILS # BLD: 0.09 K/UL (ref 0–0.12)
BILIRUB SERPL-MCNC: 3 MG/DL (ref 0.1–1.5)
BILIRUB UR QL STRIP.AUTO: ABNORMAL
BUN SERPL-MCNC: 9 MG/DL (ref 8–22)
CALCIUM SERPL-MCNC: 8.8 MG/DL (ref 8.5–10.5)
CHLORIDE SERPL-SCNC: 102 MMOL/L (ref 96–112)
CO2 SERPL-SCNC: 24 MMOL/L (ref 20–33)
COLOR UR: ABNORMAL
CREAT SERPL-MCNC: 0.52 MG/DL (ref 0.5–1.4)
EOSINOPHIL # BLD AUTO: 0.2 K/UL (ref 0–0.51)
EOSINOPHIL NFR BLD: 1.8 % (ref 0–6.9)
ERYTHROCYTE [DISTWIDTH] IN BLOOD BY AUTOMATED COUNT: 57.8 FL (ref 35.9–50)
FOLATE SERPL-MCNC: >23.2 NG/ML
GFR SERPL CREATININE-BSD FRML MDRD: >60 ML/MIN/1.73 M 2
GLOBULIN SER CALC-MCNC: 4.6 G/DL (ref 1.9–3.5)
GLUCOSE BLD-MCNC: 116 MG/DL (ref 65–99)
GLUCOSE SERPL-MCNC: 118 MG/DL (ref 65–99)
GLUCOSE UR STRIP.AUTO-MCNC: NEGATIVE MG/DL
HAV IGM SERPL QL IA: NEGATIVE
HBV CORE IGM SER QL: NEGATIVE
HBV SURFACE AG SER QL: NEGATIVE
HCT VFR BLD AUTO: 34.1 % (ref 42–52)
HCV AB SER QL: NEGATIVE
HGB BLD-MCNC: 11.7 G/DL (ref 14–18)
IMM GRANULOCYTES # BLD AUTO: 0.05 K/UL (ref 0–0.11)
IMM GRANULOCYTES NFR BLD AUTO: 0.5 % (ref 0–0.9)
INR PPP: 1.45 (ref 0.87–1.13)
KETONES UR STRIP.AUTO-MCNC: NEGATIVE MG/DL
LEUKOCYTE ESTERASE UR QL STRIP.AUTO: NEGATIVE
LYMPHOCYTES # BLD AUTO: 3.35 K/UL (ref 1–4.8)
LYMPHOCYTES NFR BLD: 30.5 % (ref 22–41)
MAGNESIUM SERPL-MCNC: 1.5 MG/DL (ref 1.5–2.5)
MAGNESIUM SERPL-MCNC: 1.7 MG/DL (ref 1.5–2.5)
MCH RBC QN AUTO: 32.3 PG (ref 27–33)
MCHC RBC AUTO-ENTMCNC: 34.3 G/DL (ref 33.7–35.3)
MCV RBC AUTO: 94.2 FL (ref 81.4–97.8)
MICRO URNS: ABNORMAL
MONOCYTES # BLD AUTO: 0.6 K/UL (ref 0–0.85)
MONOCYTES NFR BLD AUTO: 5.5 % (ref 0–13.4)
NEUTROPHILS # BLD AUTO: 6.7 K/UL (ref 1.82–7.42)
NEUTROPHILS NFR BLD: 60.9 % (ref 44–72)
NITRITE UR QL STRIP.AUTO: NEGATIVE
NRBC # BLD AUTO: 0 K/UL
NRBC BLD AUTO-RTO: 0 /100 WBC
PH UR STRIP.AUTO: >=9 [PH]
PLATELET # BLD AUTO: 171 K/UL (ref 164–446)
PMV BLD AUTO: 10.9 FL (ref 9–12.9)
POTASSIUM SERPL-SCNC: 3.9 MMOL/L (ref 3.6–5.5)
PROT SERPL-MCNC: 7.2 G/DL (ref 6–8.2)
PROT UR QL STRIP: NEGATIVE MG/DL
PROTHROMBIN TIME: 18.1 SEC (ref 12–14.6)
RBC # BLD AUTO: 3.62 M/UL (ref 4.7–6.1)
RBC UR QL AUTO: NEGATIVE
SODIUM SERPL-SCNC: 135 MMOL/L (ref 135–145)
SP GR UR STRIP.AUTO: 1.01
TSH SERPL DL<=0.005 MIU/L-ACNC: 3.18 UIU/ML (ref 0.3–3.7)
UROBILINOGEN UR STRIP.AUTO-MCNC: 2 MG/DL
VIT B12 SERPL-MCNC: 861 PG/ML (ref 211–911)
WBC # BLD AUTO: 11 K/UL (ref 4.8–10.8)

## 2017-10-10 PROCEDURE — 160036 HCHG PACU - EA ADDL 30 MINS PHASE I: Performed by: INTERNAL MEDICINE

## 2017-10-10 PROCEDURE — 500066 HCHG BITE BLOCK, ECT: Performed by: INTERNAL MEDICINE

## 2017-10-10 PROCEDURE — 81003 URINALYSIS AUTO W/O SCOPE: CPT

## 2017-10-10 PROCEDURE — 84443 ASSAY THYROID STIM HORMONE: CPT

## 2017-10-10 PROCEDURE — 90471 IMMUNIZATION ADMIN: CPT

## 2017-10-10 PROCEDURE — 83735 ASSAY OF MAGNESIUM: CPT | Mod: 91

## 2017-10-10 PROCEDURE — 80074 ACUTE HEPATITIS PANEL: CPT

## 2017-10-10 PROCEDURE — 700111 HCHG RX REV CODE 636 W/ 250 OVERRIDE (IP): Performed by: INTERNAL MEDICINE

## 2017-10-10 PROCEDURE — 82962 GLUCOSE BLOOD TEST: CPT

## 2017-10-10 PROCEDURE — 87040 BLOOD CULTURE FOR BACTERIA: CPT

## 2017-10-10 PROCEDURE — 82746 ASSAY OF FOLIC ACID SERUM: CPT

## 2017-10-10 PROCEDURE — 85025 COMPLETE CBC W/AUTO DIFF WBC: CPT

## 2017-10-10 PROCEDURE — 160002 HCHG RECOVERY MINUTES (STAT): Performed by: INTERNAL MEDICINE

## 2017-10-10 PROCEDURE — 700111 HCHG RX REV CODE 636 W/ 250 OVERRIDE (IP)

## 2017-10-10 PROCEDURE — 80053 COMPREHEN METABOLIC PANEL: CPT

## 2017-10-10 PROCEDURE — 36415 COLL VENOUS BLD VENIPUNCTURE: CPT

## 2017-10-10 PROCEDURE — 700105 HCHG RX REV CODE 258: Performed by: STUDENT IN AN ORGANIZED HEALTH CARE EDUCATION/TRAINING PROGRAM

## 2017-10-10 PROCEDURE — A9270 NON-COVERED ITEM OR SERVICE: HCPCS | Performed by: INTERNAL MEDICINE

## 2017-10-10 PROCEDURE — 160203 HCHG ENDO MINUTES - 1ST 30 MINS LEVEL 4: Performed by: INTERNAL MEDICINE

## 2017-10-10 PROCEDURE — 160009 HCHG ANES TIME/MIN: Performed by: INTERNAL MEDICINE

## 2017-10-10 PROCEDURE — 3E0234Z INTRODUCTION OF SERUM, TOXOID AND VACCINE INTO MUSCLE, PERCUTANEOUS APPROACH: ICD-10-PCS | Performed by: HOSPITALIST

## 2017-10-10 PROCEDURE — 160035 HCHG PACU - 1ST 60 MINS PHASE I: Performed by: INTERNAL MEDICINE

## 2017-10-10 PROCEDURE — C9113 INJ PANTOPRAZOLE SODIUM, VIA: HCPCS | Performed by: STUDENT IN AN ORGANIZED HEALTH CARE EDUCATION/TRAINING PROGRAM

## 2017-10-10 PROCEDURE — 82607 VITAMIN B-12: CPT

## 2017-10-10 PROCEDURE — 700111 HCHG RX REV CODE 636 W/ 250 OVERRIDE (IP): Performed by: HOSPITALIST

## 2017-10-10 PROCEDURE — 99223 1ST HOSP IP/OBS HIGH 75: CPT | Mod: GC | Performed by: HOSPITALIST

## 2017-10-10 PROCEDURE — 700111 HCHG RX REV CODE 636 W/ 250 OVERRIDE (IP): Performed by: STUDENT IN AN ORGANIZED HEALTH CARE EDUCATION/TRAINING PROGRAM

## 2017-10-10 PROCEDURE — 700105 HCHG RX REV CODE 258: Performed by: INTERNAL MEDICINE

## 2017-10-10 PROCEDURE — 700101 HCHG RX REV CODE 250: Performed by: INTERNAL MEDICINE

## 2017-10-10 PROCEDURE — 160048 HCHG OR STATISTICAL LEVEL 1-5: Performed by: INTERNAL MEDICINE

## 2017-10-10 PROCEDURE — 700101 HCHG RX REV CODE 250

## 2017-10-10 PROCEDURE — 0W3P8ZZ CONTROL BLEEDING IN GASTROINTESTINAL TRACT, VIA NATURAL OR ARTIFICIAL OPENING ENDOSCOPIC: ICD-10-PCS | Performed by: INTERNAL MEDICINE

## 2017-10-10 PROCEDURE — 770020 HCHG ROOM/CARE - TELE (206)

## 2017-10-10 PROCEDURE — 90686 IIV4 VACC NO PRSV 0.5 ML IM: CPT | Performed by: HOSPITALIST

## 2017-10-10 PROCEDURE — 700102 HCHG RX REV CODE 250 W/ 637 OVERRIDE(OP): Performed by: INTERNAL MEDICINE

## 2017-10-10 PROCEDURE — 502240 HCHG MISC OR SUPPLY RC 0272: Performed by: INTERNAL MEDICINE

## 2017-10-10 PROCEDURE — 85610 PROTHROMBIN TIME: CPT

## 2017-10-10 RX ORDER — SUCRALFATE ORAL 1 G/10ML
1 SUSPENSION ORAL
Status: DISCONTINUED | OUTPATIENT
Start: 2017-10-10 | End: 2017-10-12 | Stop reason: HOSPADM

## 2017-10-10 RX ORDER — OMEPRAZOLE 20 MG/1
40 CAPSULE, DELAYED RELEASE ORAL DAILY
Status: DISCONTINUED | OUTPATIENT
Start: 2017-10-10 | End: 2017-10-12 | Stop reason: HOSPADM

## 2017-10-10 RX ORDER — NADOLOL 20 MG/1
20 TABLET ORAL
Status: DISCONTINUED | OUTPATIENT
Start: 2017-10-10 | End: 2017-10-12 | Stop reason: HOSPADM

## 2017-10-10 RX ORDER — SODIUM CHLORIDE 9 MG/ML
INJECTION, SOLUTION INTRAVENOUS
Status: ACTIVE
Start: 2017-10-10 | End: 2017-10-11

## 2017-10-10 RX ADMIN — PANTOPRAZOLE SODIUM 40 MG: 40 INJECTION, POWDER, FOR SOLUTION INTRAVENOUS at 08:38

## 2017-10-10 RX ADMIN — SODIUM CHLORIDE: 9 INJECTION, SOLUTION INTRAVENOUS at 22:41

## 2017-10-10 RX ADMIN — SUCRALFATE 1 G: 1 SUSPENSION ORAL at 12:21

## 2017-10-10 RX ADMIN — SODIUM CHLORIDE: 9 INJECTION, SOLUTION INTRAVENOUS at 12:21

## 2017-10-10 RX ADMIN — SUCRALFATE 1 G: 1 SUSPENSION ORAL at 17:16

## 2017-10-10 RX ADMIN — NADOLOL 20 MG: 20 TABLET ORAL at 12:32

## 2017-10-10 RX ADMIN — THIAMINE HYDROCHLORIDE 100 MG: 100 INJECTION, SOLUTION INTRAMUSCULAR; INTRAVENOUS at 12:20

## 2017-10-10 RX ADMIN — CEFTRIAXONE 2 G: 2 INJECTION, SOLUTION INTRAVENOUS at 11:00

## 2017-10-10 RX ADMIN — INFLUENZA A VIRUS A/MICHIGAN/45/2015 X-275 (H1N1) ANTIGEN (FORMALDEHYDE INACTIVATED), INFLUENZA A VIRUS A/HONG KONG/4801/2014 X-263B (H3N2) ANTIGEN (FORMALDEHYDE INACTIVATED), INFLUENZA B VIRUS B/PHUKET/3073/2013 ANTIGEN (FORMALDEHYDE INACTIVATED), AND INFLUENZA B VIRUS B/BRISBANE/60/2008 ANTIGEN (FORMALDEHYDE INACTIVATED) 0.5 ML: 15; 15; 15; 15 INJECTION, SUSPENSION INTRAMUSCULAR at 05:28

## 2017-10-10 RX ADMIN — SUCRALFATE 1 G: 1 SUSPENSION ORAL at 20:23

## 2017-10-10 RX ADMIN — OMEPRAZOLE 40 MG: 20 CAPSULE, DELAYED RELEASE ORAL at 12:21

## 2017-10-10 RX ADMIN — FOLIC ACID 1 MG: 5 INJECTION, SOLUTION INTRAMUSCULAR; INTRAVENOUS; SUBCUTANEOUS at 08:38

## 2017-10-10 RX ADMIN — SODIUM CHLORIDE: 9 INJECTION, SOLUTION INTRAVENOUS at 00:47

## 2017-10-10 ASSESSMENT — LIFESTYLE VARIABLES
ANXIETY: NO ANXIETY (AT EASE)
ANXIETY: NO ANXIETY (AT EASE)
ORIENTATION AND CLOUDING OF SENSORIUM: ORIENTED AND CAN DO SERIAL ADDITIONS
ORIENTATION AND CLOUDING OF SENSORIUM: ORIENTED AND CAN DO SERIAL ADDITIONS
HEADACHE, FULLNESS IN HEAD: NOT PRESENT
AGITATION: NORMAL ACTIVITY
ORIENTATION AND CLOUDING OF SENSORIUM: ORIENTED AND CAN DO SERIAL ADDITIONS
TREMOR: TREMOR NOT VISIBLE BUT CAN BE FELT, FINGERTIP TO FINGERTIP
TOTAL SCORE: 2
VISUAL DISTURBANCES: NOT PRESENT
AUDITORY DISTURBANCES: NOT PRESENT
PAROXYSMAL SWEATS: BARELY PERCEPTIBLE SWEATING. PALMS MOIST
PAROXYSMAL SWEATS: BARELY PERCEPTIBLE SWEATING. PALMS MOIST
TREMOR: *
ORIENTATION AND CLOUDING OF SENSORIUM: ORIENTED AND CAN DO SERIAL ADDITIONS
HEADACHE, FULLNESS IN HEAD: VERY MILD
NAUSEA AND VOMITING: NO NAUSEA AND NO VOMITING
AUDITORY DISTURBANCES: NOT PRESENT
ORIENTATION AND CLOUDING OF SENSORIUM: ORIENTED AND CAN DO SERIAL ADDITIONS
NAUSEA AND VOMITING: NO NAUSEA AND NO VOMITING
TREMOR: TREMOR NOT VISIBLE BUT CAN BE FELT, FINGERTIP TO FINGERTIP
AGITATION: NORMAL ACTIVITY
TREMOR: NO TREMOR
TOTAL SCORE: 2
TREMOR: NO TREMOR
VISUAL DISTURBANCES: NOT PRESENT
PAROXYSMAL SWEATS: BARELY PERCEPTIBLE SWEATING. PALMS MOIST
VISUAL DISTURBANCES: NOT PRESENT
PAROXYSMAL SWEATS: NO SWEAT VISIBLE
TOTAL SCORE: 2
TOTAL SCORE: 3
AUDITORY DISTURBANCES: NOT PRESENT
ANXIETY: NO ANXIETY (AT EASE)
ANXIETY: MILDLY ANXIOUS
SUBSTANCE_ABUSE: 1
HEADACHE, FULLNESS IN HEAD: NOT PRESENT
AUDITORY DISTURBANCES: NOT PRESENT
NAUSEA AND VOMITING: NO NAUSEA AND NO VOMITING
AUDITORY DISTURBANCES: NOT PRESENT
AGITATION: NORMAL ACTIVITY
PAROXYSMAL SWEATS: *
HEADACHE, FULLNESS IN HEAD: NOT PRESENT
PAROXYSMAL SWEATS: *
VISUAL DISTURBANCES: NOT PRESENT
VISUAL DISTURBANCES: NOT PRESENT
TOTAL SCORE: 2
NAUSEA AND VOMITING: NO NAUSEA AND NO VOMITING
AGITATION: NORMAL ACTIVITY
ANXIETY: NO ANXIETY (AT EASE)
VISUAL DISTURBANCES: NOT PRESENT
AGITATION: NORMAL ACTIVITY
AUDITORY DISTURBANCES: NOT PRESENT
HEADACHE, FULLNESS IN HEAD: NOT PRESENT
NAUSEA AND VOMITING: NO NAUSEA AND NO VOMITING
ORIENTATION AND CLOUDING OF SENSORIUM: ORIENTED AND CAN DO SERIAL ADDITIONS
HEADACHE, FULLNESS IN HEAD: NOT PRESENT
ANXIETY: MILDLY ANXIOUS
TREMOR: NO TREMOR
NAUSEA AND VOMITING: NO NAUSEA AND NO VOMITING
AGITATION: NORMAL ACTIVITY
TOTAL SCORE: 3

## 2017-10-10 ASSESSMENT — ENCOUNTER SYMPTOMS
TINGLING: 0
WEIGHT LOSS: 1
SENSORY CHANGE: 0
NECK PAIN: 0
PALPITATIONS: 0
FALLS: 0
DIZZINESS: 0
VOMITING: 1
COUGH: 0
CHILLS: 1
HEMOPTYSIS: 0
WEAKNESS: 0
HEADACHES: 0
BLURRED VISION: 0
SORE THROAT: 0
HALLUCINATIONS: 0
DIAPHORESIS: 0
ABDOMINAL PAIN: 0
BACK PAIN: 0
DIARRHEA: 1
ABDOMINAL PAIN: 1
FEVER: 0
NERVOUS/ANXIOUS: 1
DEPRESSION: 0
FOCAL WEAKNESS: 0
FOCAL WEAKNESS: 1
CHILLS: 0
DOUBLE VISION: 0
NERVOUS/ANXIOUS: 0
ORTHOPNEA: 0
TREMORS: 0
NAUSEA: 1
VOMITING: 0
SPUTUM PRODUCTION: 0
SENSORY CHANGE: 1
SHORTNESS OF BREATH: 0
BRUISES/BLEEDS EASILY: 1
SPEECH CHANGE: 0
HEARTBURN: 1
MYALGIAS: 0

## 2017-10-10 ASSESSMENT — PAIN SCALES - GENERAL
PAINLEVEL_OUTOF10: 0

## 2017-10-10 NOTE — ED PROVIDER NOTES
ER Provider Note     Scribed for Dony Carranza M.D. by Hema Kilpatrick. 10/9/2017, 6:28 PM.    Primary Care Provider: Tyrell Barnes M.D.  Means of Arrival: Walk-in   History obtained from: Patient  History limited by: None     CHIEF COMPLAINT  Chief Complaint   Patient presents with   • N/V   • Blood in Sputum   • Diarrhea     tar like stool x 3 today       HPI  August Samuel is a 27 y.o. male who presents to the Emergency Department complaining of abdominal pain onset yesterday with associated hematemesis and melena. The patient says the symptoms started as a mild stomach ache but has gotten progressively worse since onset, with his melena and hematemesis beginning earlier this morning. He states that the abdominal pain is localized to his upper abdomen and right quadrant and describes it as an aching, throbbing pain.   He says that pain medication exacerbated his symptoms and he does not note any alleviating factors.  The patient says he drinks 6 alcohol beverages per day but that he has not had alcohol in the past 2 days.  He also states that he has only eaten once since yesterday secondary to his abdominal pain.  Patient denies dysuria.    REVIEW OF SYSTEMS  Pertinent positives include: abdominal pain, hematemesis, melena.  Pertinent negatives include: dysuria.    See HPI for further details. All other systems are negative. C.    PAST MEDICAL HISTORY   No pertinent past medical history.    SURGICAL HISTORY  patient denies any surgical history    SOCIAL HISTORY  Social History   Substance Use Topics   • Smoking status: Never Smoker   • Smokeless tobacco: Never Used   • Alcohol use Yes          FAMILY HISTORY  No pertinent past family history.    CURRENT MEDICATIONS    Current Outpatient Prescriptions on File Prior to Encounter   Medication Sig Dispense Refill   • atorvastatin (LIPITOR) 20 MG Tab Take 1 Tab by mouth every day. 30 Tab 3   • metformin (GLUCOPHAGE) 500 MG Tab Take 2 Tabs by mouth 2 times a day,  "with meals. 60 Tab 0   • lisinopril (PRINIVIL) 10 MG Tab Take 1 Tab by mouth every day. 30 Tab 0        ALLERGIES  No Known Allergies    PHYSICAL EXAM  VITAL SIGNS: /96   Pulse (!) 122   Temp 36.4 °C (97.5 °F) (Temporal)   Resp 16   Ht 1.803 m (5' 11\")   Wt 99.8 kg (220 lb 0.3 oz)   SpO2 98%   BMI 30.69 kg/m²      Constitutional: Alert in no apparent distress. Well appearing.  HENT: No signs of trauma, Bilateral external ears normal, Nose normal. Dry mucous membranes on exam.  Eyes: Pupils are equal and reactive, Conjunctiva normal, Non-icteric.   Neck: Normal range of motion, No tenderness, Supple, No stridor.   Lymphatic: No lymphadenopathy noted.   Cardiovascular: Regular  rhythm, no murmurs. Mild tachycardia.   Thorax & Lungs: Normal breath sounds, No respiratory distress, No wheezing, No chest tenderness.   Abdomen: Bowel sounds normal, Soft, No tenderness, No masses, No pulsatile masses. No peritoneal signs. Bowel sounds present. Tenderness over epigastric and left side of abdomen. No rebound, minimal guarding.   Skin: Warm, Dry, No erythema, No rash. No Jaundice.  Back: No bony tenderness, No CVA tenderness.   Rectal: Grossly positive with melena.  Extremities: Intact distal pulses, No edema in the legs or other extremities, No tenderness, No cyanosis.  Musculoskeletal: Good range of motion in all major joints. No major deformities noted.   Neurologic: Alert , Normal motor function, Normal sensory function, No focal deficits noted.   Psychiatric: Affect normal, Judgment normal, Mood normal.     DIAGNOSTIC STUDIES / PROCEDURES    LABS  Results for orders placed or performed during the hospital encounter of 10/09/17   CBC WITH DIFFERENTIAL   Result Value Ref Range    WBC 13.3 (H) 4.8 - 10.8 K/uL    RBC 4.30 (L) 4.70 - 6.10 M/uL    Hemoglobin 13.8 (L) 14.0 - 18.0 g/dL    Hematocrit 40.5 (L) 42.0 - 52.0 %    MCV 94.2 81.4 - 97.8 fL    MCH 32.1 27.0 - 33.0 pg    MCHC 34.1 33.7 - 35.3 g/dL    RDW 57.9 " (H) 35.9 - 50.0 fL    Platelet Count 222 164 - 446 K/uL    MPV 11.2 9.0 - 12.9 fL    Neutrophils-Polys 69.10 44.00 - 72.00 %    Lymphocytes 24.40 22.00 - 41.00 %    Monocytes 4.40 0.00 - 13.40 %    Eosinophils 1.00 0.00 - 6.90 %    Basophils 0.70 0.00 - 1.80 %    Immature Granulocytes 0.40 0.00 - 0.90 %    Nucleated RBC 0.00 /100 WBC    Neutrophils (Absolute) 9.17 (H) 1.82 - 7.42 K/uL    Lymphs (Absolute) 3.23 1.00 - 4.80 K/uL    Monos (Absolute) 0.58 0.00 - 0.85 K/uL    Eos (Absolute) 0.13 0.00 - 0.51 K/uL    Baso (Absolute) 0.09 0.00 - 0.12 K/uL    Immature Granulocytes (abs) 0.05 0.00 - 0.11 K/uL    NRBC (Absolute) 0.00 K/uL   COMP METABOLIC PANEL   Result Value Ref Range    Sodium 134 (L) 135 - 145 mmol/L    Potassium 3.8 3.6 - 5.5 mmol/L    Chloride 98 96 - 112 mmol/L    Co2 24 20 - 33 mmol/L    Anion Gap 12.0 (H) 0.0 - 11.9    Glucose 138 (H) 65 - 99 mg/dL    Bun 9 8 - 22 mg/dL    Creatinine 0.51 0.50 - 1.40 mg/dL    Calcium 9.6 8.5 - 10.5 mg/dL    AST(SGOT) 161 (H) 12 - 45 U/L    ALT(SGPT) 45 2 - 50 U/L    Alkaline Phosphatase 198 (H) 30 - 99 U/L    Total Bilirubin 2.9 (H) 0.1 - 1.5 mg/dL    Albumin 3.4 3.2 - 4.9 g/dL    Total Protein 9.0 (H) 6.0 - 8.2 g/dL    Globulin 5.6 (H) 1.9 - 3.5 g/dL    A-G Ratio 0.6 g/dL   ESTIMATED GFR   Result Value Ref Range    GFR If African American >60 >60 mL/min/1.73 m 2    GFR If Non African American >60 >60 mL/min/1.73 m 2   LIPASE   Result Value Ref Range    Lipase 16 11 - 82 U/L   ACCU-CHEK GLUCOSE   Result Value Ref Range    Glucose - Accu-Ck 133 (H) 65 - 99 mg/dL   EKG: STAT   Result Value Ref Range    Report       [facility]    Test Date:  2017-10-09  Pt Name:    LESTER HALLMAN                  Department: 171  MRN:        [id number]                      Room:       [facility]  Gender:     M                            Technician: TEODORO  :        1990                   Requested By:ELIN RAVI  Order #:    [id number]                    Reading MD: Marcelino Holley,  MD    Measurements  Intervals                                Axis  Rate:       110                          P:          73  AZ:         144                          QRS:        8  QRSD:       78                           T:          -18  QT:         356  QTc:        482    Interpretive Statements  SINUS TACHYCARDIA  NONSPECIFIC T ABNORMALITIES, INFERIOR LEADS  BORDERLINE PROLONGED QT INTERVAL  Compared to ECG 09/08/2017 07:55:25  No significant changes    Electronically Signed On 10-9-2017 23:49:54 PDT by Marcelino Holley MD       All labs reviewed by me.    COURSE & MEDICAL DECISION MAKING  Pertinent Labs & Imaging studies reviewed. (See chart for details)    This is a 27 y.o. male that presents With past medical history of chronic alcohol use who presents today with vomiting up the patient thinks his blood as well as bloody diarrhea. In addition the patient's been having epigastric abdominal pain. I'm concerned about a gastric ulcer versus a duodenal ulcer versus a episode of pancreatitis.  The patient's guaiac is positive for melena as well as occult positive for blood. IOP placed the patient on a PPI and consult GI.     6:28 PM - Patient seen and examined at bedside. Ordered lipase, eGFR, CBC, CMP, POCT UA, accu-chek glucose.  Patient will be medicated with Protonix 80mg, Zofran 4mg, NS 1000mL for his symptoms.  Per a picture the patient took, the blood in his vomit appears to be from a strawberry shake that he drank earlier today.    6:35 PM I performed a rectal exam which revealed that the patient is grossly positive with melena.    6:44 PM Discussed the patient's current condition with [facility].  I will speak with GI and re-consult with [facility] with more information.    6:46 PM Paged GI.      7:00 PM I discussed the patient's case and the above findings with GI Consultants regarding the patient's condition and they advise admission.      7:28 PM Paged [facility].     7:38 PM Consulted with [facility] regarding the  patient's condition who agree to admit the patient.    Patient found to have a mild anion gap as well as elevated AST and alk phos. The patient's platelet level is normal and his nose stigmata of liver disease. At this point I do not believe the patient requires octreotide given this. The patient is not anemic with a hemoglobin of 3.8. Patient's vital signs are stable at this time. The patient's INR is pending at this time. Patient's glucose is elevated at 133. Plan to admit the patient with GI consulting and plan for endoscopy in the morning.    DISPOSITION:  Patient will be admitted to [facility] in guarded condition.     FINAL IMPRESSION  1. Hyponatremia    2. Gastrointestinal hemorrhage, unspecified gastrointestinal hemorrhage type          I, Hema Kilpatrick (Ja), am scribing for, and in the presence of, Dony Carranza M.D..    Electronically signed by: Hema Kilpatrick (Ja), 10/9/2017    IDony M.D. personally performed the services described in this documentation, as scribed by Hema Kilpatrick in my presence, and it is both accurate and complete.     The note accurately reflects work and decisions made by me.  Dony Carranza  10/10/2017  1:03 AM

## 2017-10-10 NOTE — ASSESSMENT & PLAN NOTE
- patient reports a history of tachycardia  - He was evaluated by Dr. Reynolds on 09/08- was diagnosed as sinus tachycardia  - Continue to monitor

## 2017-10-10 NOTE — PROGRESS NOTES
Bedside report received, Pt care assumed. Tele box on. VSS. Pt assessment complete. Pt AOX4, no signs of distress noted at this time.  Pt c/o of 0/10 pain. Pt denies any additional needs at this time. Bed in lowest position, bed alarm is on, ambulates with standby assistance. POC discussed with Pt/family, verbalizes understanding, no questions at this time. Pt educated on fall risk and verbalizes understanding, call light within reach, will continue to monitor.

## 2017-10-10 NOTE — ED NOTES
RECEIVED REPORT FORM Rn, PT IS AAAOX4, PT IS IN NAD, PT IS BEING ADMITTED, PT WAS TOLD OF POC, PT HAS NO PAIN AT THIS TIME.

## 2017-10-10 NOTE — ED NOTES
PT IS AAOX4, PT HAS A BED, REPORT WAS CALLED. PT HAS PROTOX. BEING INFUSED. PT HAS FAMILY AT BEDSIDE. PT HAS NO S/S'S AT THIS TIME.

## 2017-10-10 NOTE — PROGRESS NOTES
Patient seen and examined prior to going back to [facility] OR for EGD with possible esophageal varices banding, hemostasis, biopsies and/or other endotherapy.  Appropriate indication of upper GI bleed, needs anesthesia due to alcohol abuse and anticipated intolerance. Risks, benefits, and alternatives were discussed with patient.  Consenting person was given an opportunity to ask questions and discuss other options.  Risks including but not limited to perforation, infection, bleeding, missed lesion(s), cardiac and/or pulmonary event, aspiration, stroke, possible need for surgery and/or interventional radiology, hospitalization possibly prolonged, discomfort, unsuccessful and/or incomplete procedure, indefinite diagnosis, ineffective therapy and/or persistent symptoms, possible need for repeat procedures and/or additional testings, damage to adjacent organs and/or vascular structures, medication reaction, disability, death, and other adverse events possibly life-threatening.  Discussion was undertaken with Layman's terms.  Consenting person stated understanding and acceptance of these risks, and wished to proceed.  Informed consent was given in clear state of mind.

## 2017-10-10 NOTE — CONSULTS
"DATE OF SERVICE:  10/09/2017    GASTROENTEROLOGY  CONSULTATION    CONSULTATION REQUESTED BY:  Dr. Dony Carranza of the emergency department.    REASON FOR CONSULTATION:  Upper gastrointestinal hemorrhage.    IDENTIFICATION:  A 27-year-old male.    CHIEF COMPLAINT:  \"I threw up blood.\"    HISTORY OF PRESENT ILLNESS:  History was obtained via interview of the   patient, review of [facility] records and discussion with the emergency department   attending.  The patient has a pertinent history of hypertension, diabetes   mellitus and dyslipidemia.  He presented complaining of throwing up blood this   morning.  He denies any retching prior to throwing up.  He had several   episodes of throwing up blood, then he had a dark black bowel movement.  No   history of similar.  No prior history of esophagogastroduodenoscopy.  He does   not take aspirin.  He occasionally uses nonsteroidal anti-inflammatory type   medications.  He has daily heartburn, no dysphagia.  The patient endorses   heavy alcohol use daily and is interested in quitting.  He is noted to have   normal hemoglobin at 13.8.  His white blood cell count is 13,000, normal   platelet count.  Lipase is normal.  Sodium is 134.  AST is 161, ALT 45, total   bilirubin 2.9, alkaline phosphatase 198.    ALLERGIES:  No known drug allergies.    MEDICATIONS:  Atorvastatin, metformin, lisinopril.    PAST MEDICAL HISTORY:  1.  Hypertension.  2.  Diabetes mellitus.  3.  Dyslipidemia.  4.  Obesity.    PAST SURGICAL HISTORY:  Right leg debridement after a brown recluse spider   bite.    SOCIAL HISTORY:  No tobacco or drugs.  He does drink alcohol daily.    FAMILY HISTORY:  Noncontributory, specifically no luminal GI disease, liver   disease, or pancreatic disease.    REVIEW OF SYSTEMS:  A 14-point review of systems negative except as noted   above.  Please see the HPI.    PHYSICAL EXAMINATION:  GENERAL:  No immediate distress, cooperative, friendly and appropriate, obese   male.  VITAL " SIGNS:  Afebrile, heart rate 89, respiratory rate 20, blood pressure   132/78, oxygen saturation 99% on room air.  HEENT:  Normocephalic and atraumatic.  Sclerae are anicteric.  Conjunctivae   pink.  Oropharynx is moist and clear with Mallampati score of 2.  NECK:  No thyroid abnormality or lymphadenopathy.  LUNGS:  Clear to auscultation bilaterally.  CARDIOVASCULAR:  Regular rate and rhythm.  ABDOMEN:  Bowel sounds present, soft, nontender, nondistended.  He does have   hepatosplenomegaly noted.  Liver edge is palpated 4 cm below the costal   margin.  EXTREMITIES:  No clubbing, cyanosis or edema.  SKIN:  No jaundice or spider angiomata.  There is palmar erythema present.  NEUROLOGICAL:  Grossly nonfocal.  Alert and oriented, cooperative, and   appropriate.  PSYCHIATRIC:  Affect is appropriate.    LABORATORY DATA:  See the HPI.    IMPRESSION:  A 27-year-old male with hypertension, diabetes mellitus,   dyslipidemia, and obesity as well as alcohol abuse, presents with hematemesis   and melena.  Considerations are many, but would include peptic ulcer disease   such as gastric or duodenal ulcer, esophagitis, gastritis less likely would be   a portal hypertensive source such as esophageal or gastric varices.  I think   he has a component of acute alcoholic hepatitis, but I doubt he has advanced   fibrosis or cirrhosis at that time.  We had a long discussion about   abstinence, he just had a new baby and does wish to quit.  We discussed   12-step based recovery at a long-term consideration.    PROBLEM LIST:  1.  Hematemesis.  2.  Melena.  3.  Heartburn.  4.  Leukocytosis.  5.  Hepatic panel abnormalities with hepatocellular and cholestatic   abnormalities.  6.  Hypertension.  7.  Diabetes mellitus.  8.  Dyslipidemia.  9.  Obesity.    PLAN AND RECOMMENDATIONS:  1.  Admission to the hospital by the internal medicine residence with   gastroenterology following closely.  2.  N.P.O. for the time being.  3.  Maintain two IV  sites.  4.  Serial hemoglobin and hematocrit with transfusions as needed for   hemoglobin less than 7 or hematocrit less than 21.  5.  Check coag parameters.  6.  Pantoprazole 40 mg IV b.i.d.  7.  Further recommendations to follow diagnostic and potentially therapeutic   EGD.  Due to alcohol abuse, I would recommend anesthesiologist assistance for   sedation.  8.  Hospitalist to manage diabetes mellitus, hypertension, and dyslipidemia.    Thank you for allowing me to participate in the care of this patient.  I will   discuss the case with Dr. Avelar who will assume care for our practice tomorrow.       ____________________________________     MD BEATRIZ DIGGS / RAJIV    DD:  10/09/2017 20:21:04  DT:  10/09/2017 21:35:10    D#:  [id number]  Job#:  [id number]    cc: Dony Carranza MD, YULISSA SOLOMON MD

## 2017-10-10 NOTE — ASSESSMENT & PLAN NOTE
- History of uncontrolled DM, last HbA1c in august was 8.4  - Glucose 130's to 150's  - ISS and hypoglycemia protocol if blood sugars are elevated >250

## 2017-10-10 NOTE — H&P
"      Internal Medicine Admitting History and Physical    Note Author: Loraine Barton M.D.       Name August Samuel     1990   Age/Sex 27 y.o. male   MRN [id number]   Code Status Full code     After 5PM or if no immediate response to page, please call for cross-coverage  Attending/Team: Dr. Pearson/ [place] See Patient List for primary contact information  Call [phone] to page    1st Call - Day Intern (R1):   Dr. Montejo 2nd Call - Day Sr. Resident (R2/R3):   Dr. Garcia       Chief Complaint:  Throwing up blood  Black stool    HPI:  Patient is a 27 year old male with PMH of diabetes mellitus, hypertension and neuropathy who presents to the ER with the above mentioned complaints. Patient reports having an \"upset stomach\" since yesterday afternoon for which he took maday -seltzer which seemed to worsen his symptoms. He had one episode of loose watery stool last night. He denies any blood or mucus in the stool. This morning, he had 4 episodes of vomiting out what he thinks was blood. Patient reports bright red vomitus initially which later turned pinkish. Patient reports drinking a strawberry banana smoothie earlier. He denies any retching prior to the vomiting episodes. Patient also reports 3 episodes of black sticky stools. He complains of burning pain mainly in the epigastric area and left upper quadrant. Denies radiation of pain. Patient reports having frequent heartburn for which he takes tums. He occasionally takes NSAIDs. Patient admits to heavy alcohol use, he drinks 6 [place] teas every day.  His last drink was on Saturday night.     Patient reports having bright red stool per rectum in the past when he was told he had hemorrhoids. He does not remember being treated for them. Denies black stools or hematemesis in the past.     Review of Systems   Constitutional: Negative for chills, diaphoresis, fever and malaise/fatigue.   HENT: Negative for congestion and hearing loss.    Eyes: Negative for " blurred vision and double vision.   Respiratory: Negative for cough, hemoptysis, sputum production and shortness of breath.    Cardiovascular: Negative for chest pain, palpitations and orthopnea.   Gastrointestinal: Positive for abdominal pain, diarrhea, heartburn, melena, nausea and vomiting.   Genitourinary: Negative for dysuria, frequency and urgency.   Musculoskeletal: Negative for back pain, joint pain, myalgias and neck pain.   Skin: Negative for itching and rash.   Neurological: Positive for sensory change and focal weakness. Negative for dizziness and headaches.        Patient reports numbness in his bilateral upper extremities and weakness in his legs from neuropathy. He uses crutches to walk   Endo/Heme/Allergies: Bruises/bleeds easily.        Reports history of frequent nose bleeds   Psychiatric/Behavioral: Negative for depression, hallucinations and suicidal ideas.             Past Medical History:   Diabetes mellitus  Alcohol induced neuropathy  Hypertension    Past Surgical History:  Surgery on right leg for spider bite          Current Outpatient Medications:  Home Medications     Reviewed by Gildardo Georges (Pharmacy Tech) on 10/09/17 at 2015  Med List Status: Complete   Medication Last Dose Status   aspirin effervescent (FRED-SELTZER) 325 MG Effer Tab 10/8/2017 Active   atorvastatin (LIPITOR) 20 MG Tab 10/8/2017 Active   lisinopril (PRINIVIL) 10 MG Tab 10/8/2017 Active   metformin (GLUCOPHAGE) 500 MG Tab 10/8/2017 Active                Medication Allergy/Sensitivities:  No Known Allergies      Family History:  Non contributory      Social History:  Social History     Social History   • Marital status:      Spouse name: N/A   • Number of children: N/A   • Years of education: N/A     Occupational History   • Not on file.     Social History Main Topics   • Smoking status: Never Smoker   • Smokeless tobacco:  chews tobacco, 1 can/ week   • Alcohol use 6 [place] iced teas everyday   • Drug  "use: None currently   • Sexual activity: Not on file     Other Topics Concern   • Not on file     Social History Narrative   • No narrative on file     Living situation: Lives with family  PCP : Tyrell Barnes M.D.        Physical Exam     Vitals:    10/09/17 1544 10/09/17 1830 10/09/17 1900 10/09/17 1921   BP:    132/78   Pulse:  (!) 129 (!) 114 89   Resp:  15 16 20   Temp:    37 °C (98.6 °F)   TempSrc:       SpO2:  94% 96% 99%   Weight: 99.8 kg (220 lb 0.3 oz)      Height:         Body mass index is 30.69 kg/m².  /78   Pulse 89   Temp 37 °C (98.6 °F)   Resp 20   Ht 1.803 m (5' 11\")   Wt 99.8 kg (220 lb 0.3 oz)   SpO2 99%   BMI 30.69 kg/m²   O2 therapy: Pulse Oximetry: 99 %    Physical Exam   Constitutional: He is oriented to person, place, and time and well-developed, well-nourished, and in no distress.   HENT:   Head: Normocephalic and atraumatic.   Mouth/Throat: No oropharyngeal exudate.   Eyes: EOM are normal. Pupils are equal, round, and reactive to light.   Neck: Normal range of motion. Neck supple.   Cardiovascular: Regular rhythm, normal heart sounds and intact distal pulses.    No murmur heard.  Tachycardic   Pulmonary/Chest: Effort normal and breath sounds normal. No respiratory distress. He has no wheezes. He has no rales.   Abdominal: Soft. Bowel sounds are normal. He exhibits no distension. There is tenderness.   Mild epigastric tenderness present   Genitourinary: Rectum normal.   Genitourinary Comments: Grossly melanotic stool   Musculoskeletal: Normal range of motion. He exhibits no edema.   Neurological: He is alert and oriented to person, place, and time. No cranial nerve deficit.   Skin: No rash noted. He is not diaphoretic. No erythema.   Psychiatric: Memory, affect and judgment normal.             Data Review       Old Records Request:   Completed  Current Records review and summary: Completed    Lab Data Review:  Recent Results (from the past 24 hour(s))   ACCU-CHEK GLUCOSE    " Collection Time: 10/09/17  3:46 PM   Result Value Ref Range    Glucose - Accu-Ck 133 (H) 65 - 99 mg/dL   CBC WITH DIFFERENTIAL    Collection Time: 10/09/17  4:27 PM   Result Value Ref Range    WBC 13.3 (H) 4.8 - 10.8 K/uL    RBC 4.30 (L) 4.70 - 6.10 M/uL    Hemoglobin 13.8 (L) 14.0 - 18.0 g/dL    Hematocrit 40.5 (L) 42.0 - 52.0 %    MCV 94.2 81.4 - 97.8 fL    MCH 32.1 27.0 - 33.0 pg    MCHC 34.1 33.7 - 35.3 g/dL    RDW 57.9 (H) 35.9 - 50.0 fL    Platelet Count 222 164 - 446 K/uL    MPV 11.2 9.0 - 12.9 fL    Neutrophils-Polys 69.10 44.00 - 72.00 %    Lymphocytes 24.40 22.00 - 41.00 %    Monocytes 4.40 0.00 - 13.40 %    Eosinophils 1.00 0.00 - 6.90 %    Basophils 0.70 0.00 - 1.80 %    Immature Granulocytes 0.40 0.00 - 0.90 %    Nucleated RBC 0.00 /100 WBC    Neutrophils (Absolute) 9.17 (H) 1.82 - 7.42 K/uL    Lymphs (Absolute) 3.23 1.00 - 4.80 K/uL    Monos (Absolute) 0.58 0.00 - 0.85 K/uL    Eos (Absolute) 0.13 0.00 - 0.51 K/uL    Baso (Absolute) 0.09 0.00 - 0.12 K/uL    Immature Granulocytes (abs) 0.05 0.00 - 0.11 K/uL    NRBC (Absolute) 0.00 K/uL   COMP METABOLIC PANEL    Collection Time: 10/09/17  4:27 PM   Result Value Ref Range    Sodium 134 (L) 135 - 145 mmol/L    Potassium 3.8 3.6 - 5.5 mmol/L    Chloride 98 96 - 112 mmol/L    Co2 24 20 - 33 mmol/L    Anion Gap 12.0 (H) 0.0 - 11.9    Glucose 138 (H) 65 - 99 mg/dL    Bun 9 8 - 22 mg/dL    Creatinine 0.51 0.50 - 1.40 mg/dL    Calcium 9.6 8.5 - 10.5 mg/dL    AST(SGOT) 161 (H) 12 - 45 U/L    ALT(SGPT) 45 2 - 50 U/L    Alkaline Phosphatase 198 (H) 30 - 99 U/L    Total Bilirubin 2.9 (H) 0.1 - 1.5 mg/dL    Albumin 3.4 3.2 - 4.9 g/dL    Total Protein 9.0 (H) 6.0 - 8.2 g/dL    Globulin 5.6 (H) 1.9 - 3.5 g/dL    A-G Ratio 0.6 g/dL   ESTIMATED GFR    Collection Time: 10/09/17  4:27 PM   Result Value Ref Range    GFR If African American >60 >60 mL/min/1.73 m 2    GFR If Non African American >60 >60 mL/min/1.73 m 2   LIPASE    Collection Time: 10/09/17  4:27 PM   Result  Value Ref Range    Lipase 16 11 - 82 U/L       Imaging/Procedures Review:    ndependant Imaging Review: Deferred  No orders to display          EKG:   EKG Independant Review: Completed  QTc:482, HR: 110, Normal Sinus Rhythm, no ST changes , non specific T wave changes    Records reviewed and summarized in current documentation             Assessment/Plan     * GI bleed   Assessment & Plan    - 27 year old patient with history of heavy alcohol use and reflux symptoms present with melena and possible hematemesis. Rectal exam was positive for melanotic stool  - Hb on arrival 13.8  - Evaluated by GI ( Dr. Tsai) in the ER  - Pantoprazole 40 mg IV BID  - NPO   - Patient to have endoscopy tomorrow by Dr. Avelar  - F/u Hb and Hct and transfuse if <7  - F/u GI recs following EGD  - SCD for DVT prophylaxis          Liver function test abnormality   Assessment & Plan    - Most likely related to alcoholic hepatitis  - Elevated AST, ALP and bilirubin  - F/u hepatitis panel, consider US abdomen        Tachycardia   Assessment & Plan    - patient reports a history of tachycardia  - He was evaluated by Dr. Reynolds on 09/08- was diagnosed as sinus tachycardia  - Continue to monitor        Diabetes mellitus (CMS-HCC)   Assessment & Plan    - History of uncontrolled DM, last HbA1c in august was 8.4  - Blood glucose today 138  - monitor            Anticipated Hospital stay:  >2 midnights        Quality Measures    Reviewed items::  EKG reviewed, Medications reviewed and Labs reviewed  Larson catheter::  No Larson  DVT prophylaxis - mechanical:  SCDs  Ulcer Prophylaxis::  Yes

## 2017-10-10 NOTE — SENIOR ADMIT NOTE
"       Senior Admit Note    28 y/o M with a h/o ETOH use presented with abdominal pain and hematemesis.   Mr. Samuel developed midabdominal pain and diarrhea yesterday.  He tried maday seltzer which worsened his pain.  This morning he had emesis which appeared to have reddish color and he also had red shake earlier that day.  He also had black tarry stool x 3 today.   + FOBT.  He drinks ETOH.  His last drink was Saturday night.       /67   Pulse (!) 101   Temp 36.6 °C (97.9 °F)   Resp 16   Ht 1.803 m (5' 11\")   Wt 99.6 kg (219 lb 9.3 oz)   SpO2 97%   BMI 30.62 kg/m²     Gen: NAD  HEENT: EOMI. Oral mucosa moist  CVS: Tachycardia, S1S2 wnl. No mrg  Resp: CTAB no wrr  Abd: Mild tenderness to palpation.  BS normactive.  Liver edge palpable       A/P  Upper GI bleed   - H/H stable.    - GI on board.   - NPO, Protonix   - EGD in the morning.     Abdominal pain  - lipase wnl  - Likely gastritis vs. PUD  - Protonix     Elevated LFT's, Elevated BR  -c/w ETOH use  - Palpable liver edge  - US abdomen    Leukocytosis  - Could be reactive  - No s/s infection  - Bcx, UA    Please, see intern note for details.                    "

## 2017-10-10 NOTE — CARE PLAN
Problem: Safety  Goal: Will remain free from falls    Intervention: Implement fall precautions  Bed is lowest position. Treaded slipper socks on pt. Call light and personal belongings within reach. Will continue to monitor.

## 2017-10-10 NOTE — DIETARY
Nutrition Services:  Poor PO on admit screen    Pt is a 27 yr old male admitted with GI bleed.    Past Medical History:  Diabetes mellitus, Alcohol induced neuropathy, HTN    Spoke with pt at bedside.  Pt reports a low appetite for three days PTA.  Pt reports that appetite is coming back.  Clear lunch tray observed at bedside was % consumed.  Pt states his UBW is ~ 100 kg (220 lbs) - current wt is 99.6 kg (219 lbs).  Pt denies any problems chewing or swallowing.      Diet:  Clear liquid, no red foods (orders to advance to full liquid at dinner)  Wt:  99.6 kg (219 lbs) - stand up scale 10/9  BMI:  30.64  Pertinent Labs:  Glucose 118, , Alk phos 155, Albumin 2.6  Pertinent Medications:  Folic acid, Prilosec, Carafate, Thiamine  Fluids:   mL/hr  Skin: No skin breakdown   GI:  Last BM PTA    Plan/Recommend:  Record percentage of meals in ADL flowsheet to ensure adequate PO intake.  Nutrition Representative to see pt for meals daily.  RD to monitor wt and lab trends.    RD following.

## 2017-10-10 NOTE — ASSESSMENT & PLAN NOTE
"Endoscopy yesterday showed \"1. Esophageal varices with bleeding, band ligation 2. Gastric fundic varices 3. Portal hypertensive gastropathy\".   Cceftriaxone was started for SBP ppx; d/c'd following abdominal U/S showed no ascites.  Started on nadolol for variceal ppx; will change to propranolol on discharge.  Prilosec 40 mg QD x 21 days, carafate suspension QID x 14 days, per GI.   Continue to monitor for bleeding. Large bore IV lines in place.    "

## 2017-10-10 NOTE — PROCEDURES
Pre-procedure Diagnoses:   Acute upper GI bleed   Melena   Alcohol liver damage   Anemia due to acute blood loss   Hematemesis without nausea    Post-procedure Diagnoses:   Esophageal varices with bleeding secondary to alcohol abuse   Gastric varices without bleeding   Portal hypertensive gastropathy   Alcoholic cirrhosis of liver without ascites   Procedures:   ESOPHAGOGASTRODUODENOSCOPY WITH BAND LIGATION OF ESOPHAGEAL VARICES       Endoscopist: Ottoniel Avelar MD, [facility], [facility]    General anesthesia: Dr. Leigh Larson    Consent: Risks, benefits, and alternatives were discussed with patient.  Consenting person was given an opportunity to ask questions and discuss other options.  Risks including but not limited to perforation, infection, bleeding, missed lesion(s), cardiac and/or pulmonary event, aspiration, stroke, possible need for surgery and/or interventional radiology, hospitalization possibly prolonged, discomfort, unsuccessful and/or incomplete procedure, ineffective therapy and/or persistent symptoms, possible need for repeat procedures and/or additional testings, damage to adjacent organs and/or vascular structures, medication reaction, disability, death, and other adverse events possibly life-threatening.  Discussion was undertaken with Layman's terms.  Consenting person stated understanding and acceptance of these risks, and wished to proceed.  Informed consent was given in clear state of mind.    Endoscopic procedures in detail: Diagnostic endoscope was inserted from mouth into second portion of the duodenum, retroflexion was performed in the stomach.  Endoscope was removed, band ligator device was fitted, endoscope was inserted into esophagus, 7 bands were deployed in spiral fashion starting at gastroesophageal junction working proximally with good decompression of esophageal varices.  The red and purple whale markings were specifically targeted with banding. There was suction of insufflated air and stomach  fluid contents upon removal.      Procedure times:  - In-room 10:16  - Start 10:27  - Completed 10:33  - Out of room 10:41    Esophagogastroduodenoscopy Findings:  - Esophagus: 4 columns of large esophageal varices with red and purple whale markings, gastroesophageal junction was at 41cm from the lips  - Stomach: large fundic gastric varices without bleeding stigmata.  Mild portal hypertensive gastropathy without bleeding  - Duodenum: endoscopically unremarkable to 2nd portion  - Therapy: esophageal varices band ligated    Impression:  1. Esophageal varices with bleeding, band ligation  2. Gastric fundic varices  3. Portal hypertensive gastropathy    Recommendations:   1.  Routine post-endoscopy anesthesia recovery care.  Discharge patient when he is awake, alert and comfortable, when recovery riteria are met.  Aspiration and fall precautions x 24 hours.   2. Alcohol cessation, warned of health hazards  3. Nadolol for gastric varices and portal hypertensive gastropathy bleeding prophylaxis  4. Prilosec 40 mg QD x 21 days, carafate suspension QID x 14 days  5. Start Rocephin 2 grams IV while hospitalized for spontaneous bacterial peritonitis prophylaxis  6. I spoke to patient and his recovery nurse about impression, diagnosis and recommendations.

## 2017-10-10 NOTE — PROGRESS NOTES
Pt transported to pre op room 10 with ACLS RN. Cardiac monitor on and tele notified of transport.

## 2017-10-10 NOTE — PROGRESS NOTES
Internal Medicine Interval Note  Note Author: Nella Garcia M.D.     Name August Samuel     1990   Age/Sex 27 y.o. male   MRN [id number]   Code Status Full code      After 5PM or if no immediate response to page, please call for cross-coverage  Attending/Team: Dr Michel Goldstein  See Patient List for primary contact information  Call [phone] to page    1st Call - Day Intern (R1):   Dr Montejo  2nd Call - Day Sr. Resident (R2/R3):   Dr Garcia      Reveals old male with a history of alcohol dependence (8 yrs), was admitted for upper GI bleed. Diagnosed with esophageal varices, gastric fundic varices s/p banding.     Reason for interval visit  (Principal Problem)   Upper GI bleed secondary to esophageal varices s/p banding   Gastric fundic varices    Interval Problem Daily Status Update  (24 hours)   Patient reports that he has been drinking a lot since last 8 years. This is the first episode of hematemesis. He was able to show me a picture of the blood that he vomited, it was a significant amount of bright red blood with clots. At the time of my examination he did not report any active abdominal pain. Reports he was dehydrated over the last 2 days.    Review of Systems   Constitutional: Positive for weight loss (25 pounds prior to May ). Negative for chills, fever and malaise/fatigue.   HENT: Negative for sore throat.    Eyes: Negative for blurred vision and double vision.   Respiratory: Negative for cough and sputum production.    Cardiovascular: Negative for chest pain, palpitations, orthopnea and leg swelling.   Gastrointestinal: Positive for abdominal pain, diarrhea, heartburn, melena, nausea and vomiting.        Since last 2 days   Genitourinary: Negative for dysuria and urgency.        Reports dark colored urine   Musculoskeletal: Negative for falls and myalgias.   Skin: Negative for itching and rash.   Neurological: Negative for dizziness, focal weakness, weakness and headaches.    Psychiatric/Behavioral: Positive for substance abuse. The patient is not nervous/anxious.        Consultants/Specialty  Gastroenterology    Disposition  Inpatient for upper GI bleed secondary to esophageal varices    Quality Measures    Reviewed items::  Labs reviewed, Medications reviewed and Radiology images reviewed  Larson catheter::  No Larson  DVT prophylaxis pharmacological::  Contraindicated - High bleeding risk  DVT prophylaxis - mechanical:  SCDs  Ulcer Prophylaxis::  Yes  Antibiotics:  Treating active infection/contamination beyond 24 hours perioperative coverage      Physical Exam       Vitals:    10/10/17 1100 10/10/17 1115 10/10/17 1130 10/10/17 1210   BP:    116/81   Pulse: 98 95 96 94   Resp: 18 18 17 18   Temp:    36.3 °C (97.4 °F)   TempSrc:       SpO2: 93% 95% 96% 93%   Weight:       Height:         Body mass index is 30.64 kg/m². Weight: 99.6 kg (219 lb 9.3 oz)  Oxygen Therapy:  Pulse Oximetry: 93 %, O2 (LPM): 0.5, O2 Delivery: Nasal Cannula    Physical Exam   Constitutional: He is oriented to person, place, and time and well-developed, well-nourished, and in no distress. No distress.   HENT:   Head: Normocephalic and atraumatic.   Mouth/Throat: No oropharyngeal exudate.   Eyes: Pupils are equal, round, and reactive to light. No scleral icterus.   Neck: Normal range of motion. Neck supple.   Cardiovascular:   No murmur heard.  Sinus tachycardia, no murmur   Pulmonary/Chest: Effort normal and breath sounds normal. No respiratory distress. He has no wheezes.   Abdominal: Soft. Bowel sounds are normal. There is tenderness. There is no rebound and no guarding.   Tenderness over epigastric, left lower quadrant   Musculoskeletal: Normal range of motion. He exhibits no edema or deformity.   Neurological: He is alert and oriented to person, place, and time.   Skin: Skin is warm and dry. He is not diaphoretic.   Psychiatric: Mood and affect normal.     Lab Data Review:   10/10/2017  4:43 PM    Recent Labs  "     10/09/17   1627  10/10/17   0406  10/10/17   0930   SODIUM  134*  135   --    POTASSIUM  3.8  3.9   --    CHLORIDE  98  102   --    CO2  24  24   --    BUN  9  9   --    CREATININE  0.51  0.52   --    MAGNESIUM   --   1.5  1.7   CALCIUM  9.6  8.8   --        Recent Labs      10/09/17   1627  10/10/17   0406   ALTSGPT  45  33   ASTSGOT  161*  131*   ALKPHOSPHAT  198*  155*   TBILIRUBIN  2.9*  3.0*   LIPASE  16   --    GLUCOSE  138*  118*       Recent Labs      10/09/17   1627  10/10/17   0406   RBC  4.30*  3.62*   HEMOGLOBIN  13.8*  11.7*   HEMATOCRIT  40.5*  34.1*   PLATELETCT  222  171   PROTHROMBTM   --   18.1*   INR   --   1.45*       Recent Labs      10/09/17   1627  10/10/17   0406   WBC  13.3*  11.0*   NEUTSPOLYS  69.10  60.90   LYMPHOCYTES  24.40  30.50   MONOCYTES  4.40  5.50   EOSINOPHILS  1.00  1.80   BASOPHILS  0.70  0.80   ASTSGOT  161*  131*   ALTSGPT  45  33   ALKPHOSPHAT  198*  155*   TBILIRUBIN  2.9*  3.0*           Assessment/Plan     * GI bleed- (present on admission)   Assessment & Plan    Patient presented with acute onset of upper GI bleed. Gastroenterology was consulted. Status post endoscopy today which showed \"1. Esophageal varices with bleeding, band ligation 2. Gastric fundic varices 3. Portal hypertensive gastropathy\". Per gastroenterology started on IV ceftriaxone for spontaneous bacterial peritonitis prophylaxis, started on nadolol for variceal prophylaxis. Prilosec 40 mg QD x 21 days, carafate suspension QID x 14 days, per GI.   Abdominal ultrasound pending. Continue to monitor for bleeding. Large bore IV lines.          Portal hypertension (CMS-HCC)- (present on admission)   Assessment & Plan    Refer above for details         Alcoholic cirrhosis (CMS-HCC)- (present on admission)   Assessment & Plan    Refer above for details         Bleeding esophageal varices (CMS-HCC)- (present on admission)   Assessment & Plan    Refer above for details         Liver function test abnormality- " (present on admission)   Assessment & Plan    - Most likely related to alcoholic liver disease with cirrhosis, ? Other less common causes of cirrhosis should be considered given cirrhosis at 27 yrs   - Hepatitis panel negative  - Ultrasound abdomen pending        Diabetes mellitus (CMS-HCC)- (present on admission)   Assessment & Plan    - History of uncontrolled DM, last HbA1c in august was 8.4  - Blood glucose today 138  - monitor, will add sliding scale, hypoglycemia protocol if blood sugars are elevated >250        Tachycardia- (present on admission)   Assessment & Plan    - patient reports a history of tachycardia  - He was evaluated by Dr. Reynolds on 09/08- was diagnosed as sinus tachycardia  - Continue to monitor

## 2017-10-10 NOTE — PROGRESS NOTES
Patient arrived from ED.  Monitor placed and data collection verified.  Family at bedside.  Report received.  Patient is AAOx4, denies pain, has treaded socks in place.  Plan of care discussed with patient and family.  Patient and family deny any questions at this time.  CIWA assessed.

## 2017-10-10 NOTE — CARE PLAN
Problem: Infection  Goal: Will remain free from infection    Intervention: Implement standard precautions and perform hand washing before and after patient contact  Hand hygiene performed before and after contact with patient.

## 2017-10-10 NOTE — ASSESSMENT & PLAN NOTE
Most likely related to alcoholic liver disease with cirrhosis  Abdominal U/S showed enlarged liver with increased echogenicity which represents fatty infiltration and/or cirrhotic change. Normal hepatopetal flow.  Hepatitis panel negative  Given young age, working up other causes of cirrhosis. Alpha 1 antitrypsin, HITESH, anti-mitochondrial, ceruloplasmin pending

## 2017-10-10 NOTE — NON-PROVIDER
Internal Medicine Medical Student Note  Note Author: María Rodriguez, Student    Name August Samuel     1990   Age/Sex 27 y.o. male   MRN [id number]   Code Status FULL     After 5PM or if no immediate response to page, please call for cross-coverage  Attending/Team: Dr. Pearson/Triston See Patient List for primary contact information  Call [phone] to page after hours   1st Call - Day Intern (R1):   Dr. Montejo 2nd Call - Day Sr. Resident (R2/R3):   Dr. Garcia       Reason for interval visit  (Principal Problem)   GI bleed    Interval Problem Daily Status Update  (24 hours)   The patient has a PMHx of chronic alcohol use, HTN, DM, dyslipidemia, and obesity. He began to have RUQ pain on 10/8 which was described as throbbing/aching and continued to worsen. The pain was associated with hematemesis x 4 and melena x 3 that both began on 10/9. The patient has been drinking 6 alcoholic drinks per day but has not had anything to drink since 10/7. He expresses an interest in stopping alcohol use due to having a new baby. His oral intake has decreased since the pain onset and he has used maday seltzer for the pain which only aggravated it.    The patient reports that his abdominal pain has lessened overnight. He reports with previous attempts to cease drinking he began to have withdrawal symptoms of cold sweats and panic attacks. He reports feeling similar symptoms as these starting this morning. He denies any history of alcohol withdrawal seizures.    Review of Systems   Constitutional: Positive for chills. Negative for fever.   HENT: Negative for congestion and sore throat.    Eyes: Negative for blurred vision and double vision.   Respiratory: Negative for cough, sputum production and shortness of breath.    Cardiovascular: Negative for chest pain and palpitations.   Gastrointestinal: Positive for nausea. Negative for abdominal pain and vomiting (Last episode of emesis yesterday morning).   Genitourinary:  Negative for dysuria, frequency and urgency.   Musculoskeletal: Negative for myalgias.   Skin: Negative for itching and rash.   Neurological: Negative for dizziness, tingling, tremors, sensory change, speech change and headaches.   Psychiatric/Behavioral: The patient is nervous/anxious (Reports anxiety 2/2 withdrawal).        Physical Exam       Vitals:    10/10/17 1045 10/10/17 1100 10/10/17 1115 10/10/17 1130   BP:       Pulse: (!) 102 98 95 96   Resp: 20 18 18 17   Temp:       TempSrc:       SpO2: 98% 93% 95% 96%   Weight:       Height:         Body mass index is 30.62 kg/m². Weight: 99.6 kg (219 lb 9.3 oz)  Oxygen Therapy:  Pulse Oximetry: 96 %, O2 (LPM): 0, O2 Delivery: Nasal Cannula    Physical Exam   Constitutional: He is oriented to person, place, and time and well-developed, well-nourished, and in no distress.   HENT:   Head: Normocephalic and atraumatic.   Eyes: Conjunctivae and EOM are normal. Pupils are equal, round, and reactive to light.   Neck: Normal range of motion.   Cardiovascular: Normal rate, regular rhythm and intact distal pulses.  Exam reveals no gallop and no friction rub.    No murmur heard.  Pulmonary/Chest: Effort normal and breath sounds normal. He has no wheezes. He has no rales.   Abdominal: Soft. Bowel sounds are normal. He exhibits no distension. There is no tenderness. There is no rebound and no guarding.   Musculoskeletal: Normal range of motion. He exhibits no edema.   Neurological: He is alert and oriented to person, place, and time.   Skin: Skin is warm and dry.   Psychiatric: Mood and affect normal.       Assessment/Plan     August Samuel is a 28 yo M with a PMHx of chronic alcohol use who presents with epigastric pain associated with hematemesis and melena.    # Upper GI Bleed   - Pt presents with acute onset epigastric pain associated with hematemesis x 4 and melena x 3   - Continue to monitor H/H. Consider transfusion if Hgb < 7   - Maintain two IV sites   - EGD found  esophageal varices with bleeding which were treated with band ligation, gastric fundic varices, portal hypertensive gastropathy   - Nadolol for gastric varices and portal hypertensive gastropathy bleeding prophylaxis   - Prilosec 40 mg daily x 21 days   - Carafate suspension QID x 14   - Rocephin 2 g IV while hospitalized for spontaneous bacterial peritonitis prophylaxis   - Continue to monitor for bleeding recurrence    # Increased LFTs   - Likely 2/2 EtOH use   - Palpable liver edge on physical exam   - Consider US of abdomen    # Leukocytosis   - Resolving 13 > 11.7   - Likely reactive   - UA WNL   - Blood culture pending    # Chronic alcohol use   - Pt expresses desire to cease alcohol intake   - Encourage and inform patient on alcohol cessation, warn of health hazards      Dispo: Inpatient

## 2017-10-10 NOTE — CARE PLAN
Problem: Safety  Goal: Will remain free from injury    Intervention: Provide assistance with mobility   10/10/17 0156   OTHER   Assistance / Tolerance Standby Assist     RN educated patient regarding the importance of calling for assistance.  Patient verbalized understanding of education and denies any questions at this time.        Problem: Knowledge Deficit  Goal: Knowledge of disease process/condition, treatment plan, diagnostic tests, and medications will improve    Intervention: Assess knowledge level of disease process/condition, treatment plan, diagnostic tests, and medications  RN educated patient regarding signs and symptoms of alcohol withdrawal.  Patient verbalized understanding of education and agrees to call for assistance.

## 2017-10-11 ENCOUNTER — APPOINTMENT (OUTPATIENT)
Dept: RADIOLOGY | Facility: MEDICAL CENTER | Age: 27
DRG: 432 | End: 2017-10-11
Attending: HOSPITALIST
Payer: COMMERCIAL

## 2017-10-11 LAB
ALBUMIN SERPL BCP-MCNC: 2.9 G/DL (ref 3.2–4.9)
ALBUMIN/GLOB SERPL: 0.5 G/DL
ALP SERPL-CCNC: 164 U/L (ref 30–99)
ALT SERPL-CCNC: 35 U/L (ref 2–50)
ANION GAP SERPL CALC-SCNC: 6 MMOL/L (ref 0–11.9)
AST SERPL-CCNC: 159 U/L (ref 12–45)
BILIRUB SERPL-MCNC: 2.6 MG/DL (ref 0.1–1.5)
BUN SERPL-MCNC: 4 MG/DL (ref 8–22)
CALCIUM SERPL-MCNC: 9.1 MG/DL (ref 8.5–10.5)
CHLORIDE SERPL-SCNC: 104 MMOL/L (ref 96–112)
CO2 SERPL-SCNC: 24 MMOL/L (ref 20–33)
CREAT SERPL-MCNC: 0.4 MG/DL (ref 0.5–1.4)
ERYTHROCYTE [DISTWIDTH] IN BLOOD BY AUTOMATED COUNT: 59.4 FL (ref 35.9–50)
FERRITIN SERPL-MCNC: 97.4 NG/ML (ref 22–322)
GFR SERPL CREATININE-BSD FRML MDRD: >60 ML/MIN/1.73 M 2
GLOBULIN SER CALC-MCNC: 5.3 G/DL (ref 1.9–3.5)
GLUCOSE SERPL-MCNC: 158 MG/DL (ref 65–99)
HCT VFR BLD AUTO: 35.9 % (ref 42–52)
HGB BLD-MCNC: 12.2 G/DL (ref 14–18)
MCH RBC QN AUTO: 32.2 PG (ref 27–33)
MCHC RBC AUTO-ENTMCNC: 34 G/DL (ref 33.7–35.3)
MCV RBC AUTO: 94.7 FL (ref 81.4–97.8)
PLATELET # BLD AUTO: 172 K/UL (ref 164–446)
PMV BLD AUTO: 11 FL (ref 9–12.9)
POTASSIUM SERPL-SCNC: 4.2 MMOL/L (ref 3.6–5.5)
PROT SERPL-MCNC: 8.2 G/DL (ref 6–8.2)
RBC # BLD AUTO: 3.79 M/UL (ref 4.7–6.1)
SODIUM SERPL-SCNC: 134 MMOL/L (ref 135–145)
TRANSFERRIN SERPL-MCNC: 220 MG/DL (ref 200–370)
WBC # BLD AUTO: 9.8 K/UL (ref 4.8–10.8)

## 2017-10-11 PROCEDURE — 700102 HCHG RX REV CODE 250 W/ 637 OVERRIDE(OP): Performed by: INTERNAL MEDICINE

## 2017-10-11 PROCEDURE — A9270 NON-COVERED ITEM OR SERVICE: HCPCS | Performed by: INTERNAL MEDICINE

## 2017-10-11 PROCEDURE — 82728 ASSAY OF FERRITIN: CPT

## 2017-10-11 PROCEDURE — 700102 HCHG RX REV CODE 250 W/ 637 OVERRIDE(OP): Performed by: STUDENT IN AN ORGANIZED HEALTH CARE EDUCATION/TRAINING PROGRAM

## 2017-10-11 PROCEDURE — 700105 HCHG RX REV CODE 258: Performed by: STUDENT IN AN ORGANIZED HEALTH CARE EDUCATION/TRAINING PROGRAM

## 2017-10-11 PROCEDURE — A9270 NON-COVERED ITEM OR SERVICE: HCPCS | Performed by: STUDENT IN AN ORGANIZED HEALTH CARE EDUCATION/TRAINING PROGRAM

## 2017-10-11 PROCEDURE — 36415 COLL VENOUS BLD VENIPUNCTURE: CPT

## 2017-10-11 PROCEDURE — 86256 FLUORESCENT ANTIBODY TITER: CPT

## 2017-10-11 PROCEDURE — 84466 ASSAY OF TRANSFERRIN: CPT

## 2017-10-11 PROCEDURE — 85027 COMPLETE CBC AUTOMATED: CPT

## 2017-10-11 PROCEDURE — 83516 IMMUNOASSAY NONANTIBODY: CPT

## 2017-10-11 PROCEDURE — 76700 US EXAM ABDOM COMPLETE: CPT

## 2017-10-11 PROCEDURE — 82103 ALPHA-1-ANTITRYPSIN TOTAL: CPT

## 2017-10-11 PROCEDURE — 700111 HCHG RX REV CODE 636 W/ 250 OVERRIDE (IP): Performed by: INTERNAL MEDICINE

## 2017-10-11 PROCEDURE — 86038 ANTINUCLEAR ANTIBODIES: CPT

## 2017-10-11 PROCEDURE — 99233 SBSQ HOSP IP/OBS HIGH 50: CPT | Mod: GC | Performed by: HOSPITALIST

## 2017-10-11 PROCEDURE — 80053 COMPREHEN METABOLIC PANEL: CPT

## 2017-10-11 PROCEDURE — 770020 HCHG ROOM/CARE - TELE (206)

## 2017-10-11 RX ORDER — FOLIC ACID 1 MG/1
1 TABLET ORAL DAILY
Status: DISCONTINUED | OUTPATIENT
Start: 2017-10-11 | End: 2017-10-12 | Stop reason: HOSPADM

## 2017-10-11 RX ORDER — THIAMINE MONONITRATE (VIT B1) 100 MG
100 TABLET ORAL DAILY
Status: DISCONTINUED | OUTPATIENT
Start: 2017-10-11 | End: 2017-10-12 | Stop reason: HOSPADM

## 2017-10-11 RX ADMIN — SUCRALFATE 1 G: 1 SUSPENSION ORAL at 20:08

## 2017-10-11 RX ADMIN — Medication 100 MG: at 14:30

## 2017-10-11 RX ADMIN — ATORVASTATIN CALCIUM 20 MG: 20 TABLET, FILM COATED ORAL at 07:50

## 2017-10-11 RX ADMIN — FOLIC ACID 1 MG: 1 TABLET ORAL at 14:49

## 2017-10-11 RX ADMIN — SUCRALFATE 1 G: 1 SUSPENSION ORAL at 17:04

## 2017-10-11 RX ADMIN — SUCRALFATE 1 G: 1 SUSPENSION ORAL at 06:15

## 2017-10-11 RX ADMIN — NADOLOL 20 MG: 20 TABLET ORAL at 07:51

## 2017-10-11 RX ADMIN — CEFTRIAXONE 2 G: 2 INJECTION, SOLUTION INTRAVENOUS at 07:54

## 2017-10-11 RX ADMIN — OMEPRAZOLE 40 MG: 20 CAPSULE, DELAYED RELEASE ORAL at 07:51

## 2017-10-11 RX ADMIN — SODIUM CHLORIDE: 9 INJECTION, SOLUTION INTRAVENOUS at 20:07

## 2017-10-11 ASSESSMENT — LIFESTYLE VARIABLES
AGITATION: NORMAL ACTIVITY
VISUAL DISTURBANCES: NOT PRESENT
PAROXYSMAL SWEATS: NO SWEAT VISIBLE
TREMOR: TREMOR NOT VISIBLE BUT CAN BE FELT, FINGERTIP TO FINGERTIP
VISUAL DISTURBANCES: NOT PRESENT
NAUSEA AND VOMITING: NO NAUSEA AND NO VOMITING
ORIENTATION AND CLOUDING OF SENSORIUM: ORIENTED AND CAN DO SERIAL ADDITIONS
AUDITORY DISTURBANCES: NOT PRESENT
AGITATION: NORMAL ACTIVITY
SUBSTANCE_ABUSE: 1
TOTAL SCORE: 1
HEADACHE, FULLNESS IN HEAD: NOT PRESENT
AUDITORY DISTURBANCES: NOT PRESENT
HEADACHE, FULLNESS IN HEAD: NOT PRESENT
NAUSEA AND VOMITING: NO NAUSEA AND NO VOMITING
PAROXYSMAL SWEATS: NO SWEAT VISIBLE
ANXIETY: NO ANXIETY (AT EASE)
TREMOR: TREMOR NOT VISIBLE BUT CAN BE FELT, FINGERTIP TO FINGERTIP
ANXIETY: NO ANXIETY (AT EASE)
AUDITORY DISTURBANCES: NOT PRESENT
ORIENTATION AND CLOUDING OF SENSORIUM: ORIENTED AND CAN DO SERIAL ADDITIONS
HEADACHE, FULLNESS IN HEAD: NOT PRESENT
PAROXYSMAL SWEATS: BARELY PERCEPTIBLE SWEATING. PALMS MOIST
NAUSEA AND VOMITING: NO NAUSEA AND NO VOMITING
ORIENTATION AND CLOUDING OF SENSORIUM: ORIENTED AND CAN DO SERIAL ADDITIONS
VISUAL DISTURBANCES: NOT PRESENT
TOTAL SCORE: 1
TREMOR: TREMOR NOT VISIBLE BUT CAN BE FELT, FINGERTIP TO FINGERTIP
TOTAL SCORE: 2
ANXIETY: NO ANXIETY (AT EASE)
AGITATION: NORMAL ACTIVITY

## 2017-10-11 ASSESSMENT — ENCOUNTER SYMPTOMS
CONSTITUTIONAL NEGATIVE: 1
TREMORS: 0
ABDOMINAL PAIN: 1
ABDOMINAL PAIN: 0
WHEEZING: 0
DIZZINESS: 0
NERVOUS/ANXIOUS: 1
VOMITING: 0
CHILLS: 0
BLOOD IN STOOL: 0
HEADACHES: 0
BLURRED VISION: 0
DOUBLE VISION: 0
DIARRHEA: 1
DIARRHEA: 0
FEVER: 0
COUGH: 0
SPUTUM PRODUCTION: 0
NERVOUS/ANXIOUS: 0
DIZZINESS: 1
WEIGHT LOSS: 1
PALPITATIONS: 0
RESPIRATORY NEGATIVE: 1
FALLS: 0
HEARTBURN: 1
NAUSEA: 0
ORTHOPNEA: 0
NAUSEA: 1
SORE THROAT: 0
GASTROINTESTINAL NEGATIVE: 1
WEAKNESS: 0
MYALGIAS: 0
CARDIOVASCULAR NEGATIVE: 1
TINGLING: 0
CONSTIPATION: 0
PSYCHIATRIC NEGATIVE: 1
VOMITING: 1
MUSCULOSKELETAL NEGATIVE: 1
NEUROLOGICAL NEGATIVE: 1
SHORTNESS OF BREATH: 0
FOCAL WEAKNESS: 0

## 2017-10-11 ASSESSMENT — PAIN SCALES - GENERAL
PAINLEVEL_OUTOF10: 0
PAINLEVEL_OUTOF10: 0

## 2017-10-11 NOTE — CARE PLAN
Problem: Safety  Goal: Will remain free from injury  Bed alarm on, non slip socks on, bed in low position, 2 side rails up, call light within reach, will continue to monitor.    Problem: Knowledge Deficit  Goal: Knowledge of disease process/condition, treatment plan, diagnostic tests, and medications will improve  Patient updated on plan of care for the day, patient verbalized understanding. All questions and concerns addressed at this time.

## 2017-10-11 NOTE — PROGRESS NOTES
Gastroenterology ([facility]) Progress Note     Author: Ottoniel MAK Avelar   Date & Time Created: 10/11/2017 10:31 AM    Interval History:  CC: esophageal varices bleed    S: melena and hematemesis resolved after EGD with banding 10/10.  He denied chest pain, nausea, vomiting and abdominal pain.  He denied further modifying factors, associated symptoms or timing issues.      Review of Systems:  Review of Systems   Constitutional: Negative.    HENT: Negative.    Respiratory: Negative.    Cardiovascular: Negative.    Gastrointestinal: Negative.  Negative for abdominal pain, melena, nausea and vomiting.   Musculoskeletal: Negative.    Skin: Negative.    Neurological: Negative.  Negative for tremors.   Psychiatric/Behavioral: Negative.    All other systems reviewed and are negative.      Physical Exam:  Physical Exam   Constitutional: He is oriented to person, place, and time. He appears well-developed and well-nourished. No distress.   HENT:   Head: Normocephalic and atraumatic.   Mouth/Throat: No oropharyngeal exudate.   Eyes: EOM are normal. Pupils are equal, round, and reactive to light. No scleral icterus.   Neck: Normal range of motion. Neck supple. No JVD present. No tracheal deviation present.   Cardiovascular: Normal rate, regular rhythm, normal heart sounds and intact distal pulses.    Pulmonary/Chest: Effort normal and breath sounds normal. No stridor. No respiratory distress.   Abdominal: Soft. Bowel sounds are normal. He exhibits no distension. There is no tenderness. There is no rebound.   Musculoskeletal: Normal range of motion. He exhibits no edema or tenderness.   Neurological: He is alert and oriented to person, place, and time. No cranial nerve deficit. Coordination normal.   Skin: Skin is warm and dry. No rash noted. He is not diaphoretic. No erythema. No pallor.   Psychiatric: He has a normal mood and affect. His behavior is normal. Thought content normal.   Nursing note and vitals reviewed.      Labs:         Invalid input(s): [id number]      Recent Labs      10/09/17   1627  10/10/17   0406  10/10/17   0930  10/11/17   0321   SODIUM  134*  135   --   134*   POTASSIUM  3.8  3.9   --   4.2   CHLORIDE  98  102   --   104   CO2  24  24   --   24   BUN  9  9   --   4*   CREATININE  0.51  0.52   --   0.40*   MAGNESIUM   --   1.5  1.7   --    CALCIUM  9.6  8.8   --   9.1     Recent Labs      10/09/17   1627  10/10/17   0406  10/11/17   0321   ALTSGPT  45  33  35   ASTSGOT  161*  131*  159*   ALKPHOSPHAT  198*  155*  164*   TBILIRUBIN  2.9*  3.0*  2.6*   LIPASE  16   --    --    GLUCOSE  138*  118*  158*     Recent Labs      10/09/17   1627  10/10/17   0406  10/11/17   0321   RBC  4.30*  3.62*  3.79*   HEMOGLOBIN  13.8*  11.7*  12.2*   HEMATOCRIT  40.5*  34.1*  35.9*   PLATELETCT  222  171  172   PROTHROMBTM   --   18.1*   --    INR   --   1.45*   --    FERRITIN   --    --   97.4     Recent Labs      10/09/17   1627  10/10/17   0406  10/11/17   0321   WBC  13.3*  11.0*  9.8   NEUTSPOLYS  69.10  60.90   --    LYMPHOCYTES  24.40  30.50   --    MONOCYTES  4.40  5.50   --    EOSINOPHILS  1.00  1.80   --    BASOPHILS  0.70  0.80   --    ASTSGOT  161*  131*  159*   ALTSGPT  45  33  35   ALKPHOSPHAT  198*  155*  164*   TBILIRUBIN  2.9*  3.0*  2.6*     Hemodynamics:  Temp (24hrs), Av.3 °C (97.4 °F), Min:36 °C (96.8 °F), Max:36.6 °C (97.9 °F)  Temperature: 36.6 °C (97.8 °F)  Pulse  Av.9  Min: 89  Max: 129Heart Rate (Monitored): 96  Blood Pressure: 113/62, NIBP: 110/74     Respiratory:    Respiration: 17, Pulse Oximetry: 96 %        RUL Breath Sounds: Clear, RML Breath Sounds: Clear, RLL Breath Sounds: Clear, OTTO Breath Sounds: Clear, LLL Breath Sounds: Clear  Fluids:    Intake/Output Summary (Last 24 hours) at 10/11/17 1031  Last data filed at 10/11/17 0700   Gross per 24 hour   Intake             1950 ml   Output             5525 ml   Net            -3575 ml     Weight: 104.3 kg (229 lb 15 oz)  GI/Nutrition:  Orders  "Placed This Encounter   Procedures   • DIET ORDER     Standing Status:   Standing     Number of Occurrences:   1     Order Specific Question:   Diet:     Answer:   Hepatic [9]     Order Specific Question:   Texture/Fiber modifications:     Answer:   Dysphagia 3(Mechanical Soft)specify fluid consistency(question 6) [3]     Comments:   dental soft       Problems:  1. Esophageal varices with bleeding, band ligation x 7 on 10/10/2017 with good hemostasis  2. Gastric fundic varices, non-bleeding but were large  3. Portal hypertensive gastropathy, mild severity  4. Alcohol induced liver cirrhosis without ascites  5. Abdominal ultrasound 10/11/2017 showed \"Enlarged liver with increased echogenicity which represent fatty infiltration and/or cirrhotic change. No mass or biliary dilatation.  No cholelithiasis. Contracted gallbladder.  Normal appearance of the pancreas.  Normal hepatopedal flow in the portal vein. No portal vein enlargement.  No ascites.  Splenomegaly. Small accessory spleen is present.\"  6. Anemia from acute blood loss  7. Melena and hematemesis, resolved  8. Alcoholism  9. Hypertension.  10. Diabetes mellitus type 2  11.  Dyslipidemia.  12.  Obesity.    Recommendations:  1. Most importantly alcohol cessation, I warned patient of health hazards  2. Nadolol for gastric varices and portal hypertensive gastropathy bleeding prophylaxis  3. Prilosec 40 mg QD x 21 days  4. Carafate suspension QID x 14 days  5. Rocephin 2 grams IV while hospitalized for spontaneous bacterial peritonitis prophylaxis  6. Advanced to hepatic dental soft diet  7. Check HITESH, A1AT, ceruloplasmin, fasting ferritin and anti-mitochrondrial antibody due to young age of onset for liver cirrhosis.  8. Continue with atorvastatin due to potential benefits in liver cirrhosis and steatosis  9. Please note that Dr. Oleg Lr will be taking over [facility] inpatient service tomorrow.      Reviewed items::  Labs reviewed and Medications reviewed    "

## 2017-10-11 NOTE — PROGRESS NOTES
Up in chair for meals. No active bleeding noted. Tolerating po meds and meals without complications.

## 2017-10-11 NOTE — PROGRESS NOTES
Received report and assumed care of patient. Patient is alert and oriented x4. Patient is in no signs of distress and denies pain at this time. Patient was updated on the plan of care for the day. Call light within reach, bed in low position, 2 side rails up. All fall precautions in place. Will continue to monitor.

## 2017-10-11 NOTE — NON-PROVIDER
Internal Medicine Medical Student Note  Note Author: María Rodriguez, Student    Name August Samuel     1990   Age/Sex 27 y.o. male   MRN [id number]   Code Status FULL     After 5PM or if no immediate response to page, please call for cross-coverage  Attending/Team: Dr. Pearson/Triston See Patient List for primary contact information  Call [phone] to page after hours   1st Call - Day Intern (R1):   Dr. Montejo 2nd Call - Day Sr. Resident (R2/R3):   Dr. Garcia       Reason for interval visit  (Principal Problem)   GI bleed    Interval Problem Daily Status Update  (24 hours)   The patient had an EGD performed yesterday which revealed esophageal varices treated with band ligation, gastric fundic varices, and portal hypertensive gastropathy. The patient reports no new changes overnight. He denies abdominal pain, emesis, or melena. He passed one BM this morning which was green in color and without blood. The patient reports feeling good overall but endorses symptoms of GERD and some anxiety 2/2 alcohol withdrawal.    The patient appears to have a misunderstanding of his condition and a lack of appreciation of the severity and chronic nature of his disease. He was provided educational handouts that explain esophageal varices, cirrhosis, and alcohol use. He demonstrated a desire to learn about his condition and how to best prevent further exacerbations.    Review of Systems   Constitutional: Negative for chills, fever and malaise/fatigue.   HENT: Negative for congestion and sore throat.    Eyes: Negative for blurred vision and double vision.   Respiratory: Negative for cough, sputum production, shortness of breath and wheezing.    Cardiovascular: Negative for chest pain, palpitations and leg swelling.   Gastrointestinal: Positive for heartburn. Negative for abdominal pain, blood in stool, constipation, diarrhea, melena, nausea and vomiting.   Genitourinary: Negative for dysuria, frequency and urgency.    Musculoskeletal: Negative for myalgias.   Skin: Negative for itching and rash.   Neurological: Positive for dizziness (Dizziness upon standing). Negative for tingling, tremors, weakness and headaches.   Psychiatric/Behavioral: The patient is nervous/anxious.        Physical Exam       Vitals:    10/10/17 2036 10/11/17 0037 10/11/17 0340 10/11/17 0800   BP: 134/79 121/71 125/81 113/62   Pulse: 98 (!) 109 (!) 101 96   Resp: 18 17 17 17   Temp: 36.6 °C (97.9 °F) 36.3 °C (97.4 °F) 36 °C (96.8 °F) 36.6 °C (97.8 °F)   TempSrc:       SpO2: 94% 94% 97% 96%   Weight: 104.3 kg (229 lb 15 oz)      Height:         Body mass index is 32.08 kg/m². Weight: 104.3 kg (229 lb 15 oz)  Oxygen Therapy:  Pulse Oximetry: 96 %, O2 (LPM): 0, O2 Delivery: None (Room Air)    Physical Exam   Constitutional: He is oriented to person, place, and time and well-developed, well-nourished, and in no distress.   HENT:   Head: Normocephalic and atraumatic.   Eyes: Conjunctivae and EOM are normal. Pupils are equal, round, and reactive to light.   Neck: Normal range of motion.   Cardiovascular: Normal rate, regular rhythm, normal heart sounds and intact distal pulses.  Exam reveals no gallop and no friction rub.    No murmur heard.  Pulmonary/Chest: Effort normal and breath sounds normal. He has no wheezes. He has no rales.   Abdominal: Soft. Bowel sounds are normal. There is no tenderness. There is no rebound and no guarding.   Musculoskeletal: Normal range of motion.   Neurological: He is alert and oriented to person, place, and time.   Skin: Skin is warm and dry.   Psychiatric: Mood and affect normal.       Assessment/Plan     August Samuel is a 26 yo M with a hx of chronic alcohol use who presents with hematemesis, melena, and epigastric pain.    # Upper GI Bleed   - Pt presents with acute onset epigastric pain associated with hematemesis x 4 and melena x 3   - EGD found esophageal varices with bleeding which were tx with band ligation, gastric  "fundic varices, and portal hypertensive gastropathy   - Abd US: \"enlarged liver with increased echogenicity which represent fatty infiltration and/orcirrhotic change\" and splenomegaly   - Continue to monitor H/H. Consider transfusion if Hgb < 7   - Maintain two IV sites   - Nadolol for gastric varices and portal hypertensive gastropathy bleeding prophylaxis. Consider switching to propranolol prior to DC   - Prilosec 40 mg daily x 21 days   - Carafate suspension QID x 14 days   - DC Rocephin due to lack of ascites   - Continue to monitor for bleeding recurrence    # Increased LFTs   - Likely 2/2 EtOH use causing cirrhosis and fatty change of liver   - ? Other causes of cirrhosis due to pt's young age   - Hepatitis panel negative   - HITESH, Ceruloplasmin, Mitochondrial (M2) ab, ferritin, alpha-1-antitrypsin, anti-smooth muscle abs pending   - Pt appears to not fully appreciate chronicity of his condition. Knowledge deficit addressed by providing pt with educational handouts on his condition    # Diabetes mellitus   - Hx of uncontrolled DM, last HbA1c in Aug was 8.4   - Blood glucose today 158   - Continue to monitor   - SS, hypoglycemia protocol if blood sugars elevated > 250    # Chronic alcohol use   - Pt expresses desire to cease alcohol intake   - Encourage and inform pt on alcohol cessation, warn of health hazards      Dispo: Inpatient    "

## 2017-10-12 VITALS
HEIGHT: 71 IN | DIASTOLIC BLOOD PRESSURE: 69 MMHG | TEMPERATURE: 97.1 F | HEART RATE: 88 BPM | BODY MASS INDEX: 32.56 KG/M2 | RESPIRATION RATE: 17 BRPM | OXYGEN SATURATION: 96 % | WEIGHT: 232.59 LBS | SYSTOLIC BLOOD PRESSURE: 116 MMHG

## 2017-10-12 PROBLEM — I85.01 BLEEDING ESOPHAGEAL VARICES (HCC): Status: RESOLVED | Noted: 2017-10-10 | Resolved: 2017-10-12

## 2017-10-12 PROBLEM — E80.6 HYPERBILIRUBINEMIA: Status: RESOLVED | Noted: 2017-10-10 | Resolved: 2017-10-12

## 2017-10-12 PROBLEM — K92.2 GI BLEED: Status: RESOLVED | Noted: 2017-10-09 | Resolved: 2017-10-12

## 2017-10-12 LAB
A1AT SERPL-MCNC: 179 MG/DL (ref 90–200)
ANION GAP SERPL CALC-SCNC: 9 MMOL/L (ref 0–11.9)
BASOPHILS # BLD AUTO: 0.7 % (ref 0–1.8)
BASOPHILS # BLD: 0.08 K/UL (ref 0–0.12)
BUN SERPL-MCNC: 5 MG/DL (ref 8–22)
CALCIUM SERPL-MCNC: 8.5 MG/DL (ref 8.5–10.5)
CHLORIDE SERPL-SCNC: 105 MMOL/L (ref 96–112)
CO2 SERPL-SCNC: 24 MMOL/L (ref 20–33)
CREAT SERPL-MCNC: 0.55 MG/DL (ref 0.5–1.4)
EOSINOPHIL # BLD AUTO: 0.06 K/UL (ref 0–0.51)
EOSINOPHIL NFR BLD: 0.5 % (ref 0–6.9)
ERYTHROCYTE [DISTWIDTH] IN BLOOD BY AUTOMATED COUNT: 61.3 FL (ref 35.9–50)
FERRITIN SERPL-MCNC: 93.6 NG/ML (ref 22–322)
GFR SERPL CREATININE-BSD FRML MDRD: >60 ML/MIN/1.73 M 2
GLUCOSE SERPL-MCNC: 121 MG/DL (ref 65–99)
HCT VFR BLD AUTO: 35.4 % (ref 42–52)
HGB BLD-MCNC: 11.8 G/DL (ref 14–18)
IMM GRANULOCYTES # BLD AUTO: 0.06 K/UL (ref 0–0.11)
IMM GRANULOCYTES NFR BLD AUTO: 0.5 % (ref 0–0.9)
LYMPHOCYTES # BLD AUTO: 3.39 K/UL (ref 1–4.8)
LYMPHOCYTES NFR BLD: 28.3 % (ref 22–41)
MCH RBC QN AUTO: 31.9 PG (ref 27–33)
MCHC RBC AUTO-ENTMCNC: 33.3 G/DL (ref 33.7–35.3)
MCV RBC AUTO: 95.7 FL (ref 81.4–97.8)
MONOCYTES # BLD AUTO: 0.47 K/UL (ref 0–0.85)
MONOCYTES NFR BLD AUTO: 3.9 % (ref 0–13.4)
NEUTROPHILS # BLD AUTO: 7.94 K/UL (ref 1.82–7.42)
NEUTROPHILS NFR BLD: 66.1 % (ref 44–72)
NRBC # BLD AUTO: 0 K/UL
NRBC BLD AUTO-RTO: 0 /100 WBC
PLATELET # BLD AUTO: 179 K/UL (ref 164–446)
PMV BLD AUTO: 11 FL (ref 9–12.9)
POTASSIUM SERPL-SCNC: 3.7 MMOL/L (ref 3.6–5.5)
RBC # BLD AUTO: 3.7 M/UL (ref 4.7–6.1)
SODIUM SERPL-SCNC: 138 MMOL/L (ref 135–145)
WBC # BLD AUTO: 12 K/UL (ref 4.8–10.8)

## 2017-10-12 PROCEDURE — 3E0234Z INTRODUCTION OF SERUM, TOXOID AND VACCINE INTO MUSCLE, PERCUTANEOUS APPROACH: ICD-10-PCS | Performed by: HOSPITALIST

## 2017-10-12 PROCEDURE — A9270 NON-COVERED ITEM OR SERVICE: HCPCS | Performed by: INTERNAL MEDICINE

## 2017-10-12 PROCEDURE — 86235 NUCLEAR ANTIGEN ANTIBODY: CPT

## 2017-10-12 PROCEDURE — 700102 HCHG RX REV CODE 250 W/ 637 OVERRIDE(OP): Performed by: STUDENT IN AN ORGANIZED HEALTH CARE EDUCATION/TRAINING PROGRAM

## 2017-10-12 PROCEDURE — 86038 ANTINUCLEAR ANTIBODIES: CPT

## 2017-10-12 PROCEDURE — 82728 ASSAY OF FERRITIN: CPT

## 2017-10-12 PROCEDURE — 86255 FLUORESCENT ANTIBODY SCREEN: CPT

## 2017-10-12 PROCEDURE — 90732 PPSV23 VACC 2 YRS+ SUBQ/IM: CPT | Performed by: HOSPITALIST

## 2017-10-12 PROCEDURE — 80048 BASIC METABOLIC PNL TOTAL CA: CPT

## 2017-10-12 PROCEDURE — A9270 NON-COVERED ITEM OR SERVICE: HCPCS | Performed by: STUDENT IN AN ORGANIZED HEALTH CARE EDUCATION/TRAINING PROGRAM

## 2017-10-12 PROCEDURE — 700111 HCHG RX REV CODE 636 W/ 250 OVERRIDE (IP): Performed by: HOSPITALIST

## 2017-10-12 PROCEDURE — 99238 HOSP IP/OBS DSCHRG MGMT 30/<: CPT | Mod: GC | Performed by: HOSPITALIST

## 2017-10-12 PROCEDURE — 36415 COLL VENOUS BLD VENIPUNCTURE: CPT

## 2017-10-12 PROCEDURE — 86225 DNA ANTIBODY NATIVE: CPT

## 2017-10-12 PROCEDURE — 700102 HCHG RX REV CODE 250 W/ 637 OVERRIDE(OP): Performed by: INTERNAL MEDICINE

## 2017-10-12 PROCEDURE — 85025 COMPLETE CBC W/AUTO DIFF WBC: CPT

## 2017-10-12 PROCEDURE — 82390 ASSAY OF CERULOPLASMIN: CPT

## 2017-10-12 PROCEDURE — 90471 IMMUNIZATION ADMIN: CPT

## 2017-10-12 RX ORDER — SUCRALFATE ORAL 1 G/10ML
1 SUSPENSION ORAL
Qty: 480 ML | Refills: 0 | Status: SHIPPED | OUTPATIENT
Start: 2017-10-12 | End: 2017-10-24

## 2017-10-12 RX ORDER — OMEPRAZOLE 40 MG/1
40 CAPSULE, DELAYED RELEASE ORAL DAILY
Qty: 19 CAP | Refills: 0 | Status: SHIPPED | OUTPATIENT
Start: 2017-10-12 | End: 2017-10-31

## 2017-10-12 RX ORDER — LANOLIN ALCOHOL/MO/W.PET/CERES
100 CREAM (GRAM) TOPICAL DAILY
Qty: 30 TAB | Refills: 0 | Status: SHIPPED | OUTPATIENT
Start: 2017-10-12 | End: 2018-03-09

## 2017-10-12 RX ORDER — PROPRANOLOL HYDROCHLORIDE 20 MG/1
20 TABLET ORAL 2 TIMES DAILY
Qty: 60 TAB | Refills: 0 | Status: SHIPPED | OUTPATIENT
Start: 2017-10-12 | End: 2018-03-09

## 2017-10-12 RX ADMIN — FOLIC ACID 1 MG: 1 TABLET ORAL at 08:14

## 2017-10-12 RX ADMIN — OMEPRAZOLE 40 MG: 20 CAPSULE, DELAYED RELEASE ORAL at 08:14

## 2017-10-12 RX ADMIN — ATORVASTATIN CALCIUM 20 MG: 20 TABLET, FILM COATED ORAL at 08:14

## 2017-10-12 RX ADMIN — PNEUMOCOCCAL VACCINE POLYVALENT 25 MCG
25; 25; 25; 25; 25; 25; 25; 25; 25; 25; 25; 25; 25; 25; 25; 25; 25; 25; 25; 25; 25; 25; 25 INJECTION, SOLUTION INTRAMUSCULAR; SUBCUTANEOUS at 13:45

## 2017-10-12 RX ADMIN — Medication 100 MG: at 08:18

## 2017-10-12 RX ADMIN — SUCRALFATE 1 G: 1 SUSPENSION ORAL at 11:24

## 2017-10-12 RX ADMIN — SUCRALFATE 1 G: 1 SUSPENSION ORAL at 06:18

## 2017-10-12 RX ADMIN — NADOLOL 20 MG: 20 TABLET ORAL at 08:14

## 2017-10-12 ASSESSMENT — PAIN SCALES - GENERAL
PAINLEVEL_OUTOF10: 0
PAINLEVEL_OUTOF10: 0

## 2017-10-12 ASSESSMENT — ENCOUNTER SYMPTOMS
CARDIOVASCULAR NEGATIVE: 1
NAUSEA: 0
ABDOMINAL PAIN: 0
PSYCHIATRIC NEGATIVE: 1
CONSTITUTIONAL NEGATIVE: 1
TREMORS: 0
RESPIRATORY NEGATIVE: 1
VOMITING: 0

## 2017-10-12 NOTE — DISCHARGE SUMMARY
Internal Medicine Discharge Summary  Note Author: Nella Randallmaurajared       Admit Date:  10/9/2017       Discharge Date:   10/12/2017    Service:   [facility] Internal Medicine Heard Team  Attending Physician(s):   Dr. Pearson       Senior Resident(s):   Dr. Garcia  Leo Resident(s):   Dr. Montejo      Primary Diagnosis:   Cirrhosis with portal hypertension  Upper gastrointestinal bleeding secondary to Esophageal varices    Secondary Diagnoses:                  Alcoholic cirrhosis (CMS-HCC) POA: Yes    Portal hypertension (CMS-HCC) POA: Yes    Hyperbilirubinemia POA: Unknown    Diabetes mellitus (CMS-HCC) POA: Yes    Liver function test abnormality POA: Yes    Sinus Tachycardia POA: Yes    Peripheral neuropathy        Hospital Summary (Brief Narrative):       For complete details please refer to history and physical on admission.  Mr. Samuel is a 27 years old male with a history of significant alcohol abuse, recently diagnosed diabetes mellitus, peripheral neuropathy, obesity was admitted for acute onset of upper GI bleed. Patient was hemodynamically stable. Treated with IV pantoprazole, IV fluids, telemetry monitoring. Gastroenterology was consulted. Endoscopy was consistent with 4 columns of large esophageal varices status post band ligation, gastric fundic varices, portal hypertensive gastropathy. Patient was treated with Prilosec 40 mg, Carafate suspension 4 times a day, nadolol (for gastric varices and portal hypertensive gastropathy prophylaxis), IV ceftriaxone (for spontaneous bacterial peritonitis prophylaxis). Ultrasound abdomen was consistent with enlarged liver (fatty infiltration and/or cirrhotic change), splenomegaly, negative for cholelithiasis, ascites. Pending HITESH, antimitochondrial antibody and ceruloplasmin, anti-smooth muscle antibodies. Alpha-1 antitrypsin, transferrin saturation were within normal limits.    Advised to follow-up with gastroenterology as recommended. Arranged for repeat  endoscopy with banding in 4-6 weeks as outpatient, per gastroenterology. Appreciate recommendations.  Patient was extensively counseled about alcohol abstinence. verbalized understanding. Multiple handouts given about medical condition. Advised to follow up with outpatient primary care physician in 2 weeks.  Advised to continue metformin for diabetes metastatic to, B12 for peripheral neuropathy, atorvastatin for dyslipidemia, change nadolol to propranolol twice a day. Patient was medically stable on discharge.      Consultants:     Gastroenterology    Procedures:        EGD with banding 10/10/2017    Imaging/ Testing:      US-ABDOMEN COMPLETE SURVEY   Final Result      1.  Enlarged liver with increased echogenicity which represent fatty infiltration and/or cirrhotic change. No mass or biliary dilatation.      2.  No cholelithiasis. Contracted gallbladder.      3.  Normal appearance of the pancreas.      4.  Normal hepatopedal flow in the portal vein. No portal vein enlargement.      5.  No ascites.      6.  Splenomegaly. Small accessory spleen is present.             Discharge Medications:           Medication List      START taking these medications      Instructions   cyanocobalamin 1000 MCG Tabs  Commonly known as:  VITAMIN B12   Take 1 Tab by mouth every day.  Dose:  1000 mcg     omeprazole 40 MG delayed-release capsule  Commonly known as:  PRILOSEC   Take 1 Cap by mouth every day for 19 days.  Dose:  40 mg     propranolol 20 MG Tabs  Commonly known as:  INDERAL   Take 1 Tab by mouth 2 times a day.  Dose:  20 mg     sucralfate 1 GM/10ML Susp  Commonly known as:  CARAFATE   Take 10 mL by mouth 4 Times a Day,Before Meals and at Bedtime for 12 days.  Dose:  1 g     thiamine 100 MG tablet  Commonly known as:  THIAMINE   Take 1 Tab by mouth every day.  Dose:  100 mg        CONTINUE taking these medications      Instructions   atorvastatin 20 MG Tabs  Commonly known as:  LIPITOR   Take 1 Tab by mouth every day.  Dose:   20 mg     lisinopril 10 MG Tabs  Commonly known as:  PRINIVIL   Take 1 Tab by mouth every day.  Dose:  10 mg     metformin 500 MG Tabs  Commonly known as:  GLUCOPHAGE   Take 2 Tabs by mouth 2 times a day, with meals.  Dose:  1000 mg        STOP taking these medications    aspirin effervescent 325 MG Tbef  Commonly known as:  SANTOS            Disposition:   Discharge to home    Diet:   Regular    Activity:   As tolerated    Instructions:      The patient was instructed to return to the ER in the event of worsening symptoms. I have counseled the patient on the importance of compliance and the patient has agreed to proceed with all medical recommendations and follow up plan indicated above.   The patient understands that all medications come with benefits and risks. Risks may include permanent injury or death and these risks can be minimized with close reassessment and monitoring.        Primary Care Provider:    Tyrell Barnes M.D.  Discharge summary faxed to primary care provider:  Completed  Copy of discharge summary given to the patient: Deferred      Follow up appointment details :      Follow-up with primary care physician in 2 weeks, as scheduled  Follow-up with gastroenterology in 4-6 weeks    Pending Studies:        HITESH, A1AT, ceruloplasmin, fasting ferritin and anti-mitochrondrial antibody     Time spent on discharge day patient visit, preparing discharge paperwork and arranging for patient follow up.    Summary of follow up issues:  Monitor alcohol abstinence. Possible referrals as outpatient.     Discharge Time (Minutes) :   45 minutes     Condition on Discharge    Stable   ______________________________________________________________________    Interval history/exam for day of discharge:     Pt seen this morning on rounds. No events o/n. No acute complaints.   He has not had any recurrent bleeding or emesis. Last BM yesterday evening, brown/formed.  No abdominal pain. Endorses some mild  heartburn/esophageal discomfort. Continuing Omeprazole and Carafate.    Vitals:    10/12/17 0000 10/12/17 0400 10/12/17 0800 10/12/17 1200   BP: 113/87 112/85 115/80 116/69   Pulse: 88 91 82 88   Resp: 18 18 18 17   Temp: 37.1 °C (98.8 °F) 36.9 °C (98.5 °F) 36.8 °C (98.3 °F) 36.2 °C (97.1 °F)   TempSrc:       SpO2: 99% 94% 92% 96%   Weight:       Height:         Weight/BMI: Body mass index is 32.45 kg/m².  Pulse Oximetry: 96 %, O2 (LPM): 0, O2 Delivery: None (Room Air)    General: WDWN, no acute distress  CVS: RRR, no murmurs.  PULM: Normal respiratory effort. CTAB, no rales.  Abd: +BS. Soft non-tender, non-distended.     Most Recent Labs:    Lab Results   Component Value Date/Time    WBC 12.0 (H) 10/12/2017 03:08 AM    RBC 3.70 (L) 10/12/2017 03:08 AM    HEMOGLOBIN 11.8 (L) 10/12/2017 03:08 AM    HEMATOCRIT 35.4 (L) 10/12/2017 03:08 AM    MCV 95.7 10/12/2017 03:08 AM    MCH 31.9 10/12/2017 03:08 AM    MCHC 33.3 (L) 10/12/2017 03:08 AM    MPV 11.0 10/12/2017 03:08 AM    NEUTSPOLYS 66.10 10/12/2017 03:08 AM    LYMPHOCYTES 28.30 10/12/2017 03:08 AM    MONOCYTES 3.90 10/12/2017 03:08 AM    EOSINOPHILS 0.50 10/12/2017 03:08 AM    BASOPHILS 0.70 10/12/2017 03:08 AM      Lab Results   Component Value Date/Time    SODIUM 138 10/12/2017 03:08 AM    POTASSIUM 3.7 10/12/2017 03:08 AM    CHLORIDE 105 10/12/2017 03:08 AM    CO2 24 10/12/2017 03:08 AM    GLUCOSE 121 (H) 10/12/2017 03:08 AM    BUN 5 (L) 10/12/2017 03:08 AM    CREATININE 0.55 10/12/2017 03:08 AM      Lab Results   Component Value Date/Time    ALTSGPT 35 10/11/2017 03:21 AM    ASTSGOT 159 (H) 10/11/2017 03:21 AM    ALKPHOSPHAT 164 (H) 10/11/2017 03:21 AM    TBILIRUBIN 2.6 (H) 10/11/2017 03:21 AM    LIPASE 16 10/09/2017 04:27 PM    ALBUMIN 2.9 (L) 10/11/2017 03:21 AM    GLOBULIN 5.3 (H) 10/11/2017 03:21 AM    INR 1.45 (H) 10/10/2017 04:06 AM     Lab Results   Component Value Date/Time    PROTHROMBTM 18.1 (H) 10/10/2017 04:06 AM    INR 1.45 (H) 10/10/2017 04:06 AM

## 2017-10-12 NOTE — CARE PLAN
Problem: Communication  Goal: The ability to communicate needs accurately and effectively will improve  Outcome: PROGRESSING AS EXPECTED  Questions answered. Pt encouraged to quit drinking. Pt educated to follow up with GI as an outpatient by GI MD    Problem: Infection  Goal: Will remain free from infection  Outcome: PROGRESSING AS EXPECTED  Pt monitored for signs and symptoms of infection. Vitals and labs assessed and monitored for signs of infection. Proper hand hygiene performed prior to entering and exiting pt's room. Pt educated on proper hand hygiene.

## 2017-10-12 NOTE — CARE PLAN
Problem: Infection  Goal: Will remain free from infection  Outcome: PROGRESSING AS EXPECTED  Pt and pt visitor were both reminded of the importance of hand hygiene and foaming in and out of a room to decrease the spread of infection and disease. Both nodded in understanding and compliance.     Problem: Discharge Barriers/Planning  Goal: Patient's continuum of care needs will be met  Outcome: PROGRESSING AS EXPECTED  Plan for tomorrow discussed as reviewing hemoglobin levels to make sure that active bleeding has stopped after EGD and banding have been documented as successful. Pt understands what hemoglobin levels indicate and how that may effect oxygen saturation. Pt was educated on identifying signs and symptoms of bleeding such as bloody stool, bloody emesis, etc.

## 2017-10-12 NOTE — PROGRESS NOTES
Received report and assumed care of patient. Patient is alert and oriented x4. Patient is in no signs of distress and denies pain at this time. Patient was updated on the plan of care for the day. Patient is pending discharge today. Call light within reach, bed in low position, 2 side rails up. Educated on fall risk, verbalizes understanding. Will continue to monitor

## 2017-10-12 NOTE — PROGRESS NOTES
Internal Medicine Interval Note  Note Author: Earle Montejo M.D.     Name August Samuel     1990   Age/Sex 27 y.o. male   MRN [id number]   Code Status Full code      After 5PM or if no immediate response to page, please call for cross-coverage  Attending/Team: Dr Michel Goldstein  See Patient List for primary contact information  Call [phone] to page    1st Call - Day Intern (R1):   Dr Montejo  2nd Call - Day Sr. Resident (R2/R3):   Dr Garcia        Reason for interval visit  (Principal Problem)   Upper GI bleed secondary to esophageal varices s/p banding   Gastric fundic varices    Interval Problem Daily Status Update  (24 hours)   EGD yesterday revealed esophageal varices treated with band ligation, gastric fundic varices, and portal hypertensive gastropathy.   No acute events overnight. Pt denies abdominal pain, emesis, or melena. Had BM this morning which was green in color and without blood.   Endorses some GERD and some anxiety 2/2 alcohol withdrawal  Pt was provided with educational materials regarding the nature of his disease and how best to prevent future exacerbations.    Review of Systems   Constitutional: Positive for weight loss (25 pounds prior to May ). Negative for chills, fever and malaise/fatigue.   HENT: Negative for sore throat.    Eyes: Negative for blurred vision and double vision.   Respiratory: Negative for cough and sputum production.    Cardiovascular: Negative for chest pain, palpitations, orthopnea and leg swelling.   Gastrointestinal: Positive for abdominal pain, diarrhea, heartburn, melena, nausea and vomiting.        Since last 2 days   Genitourinary: Negative for dysuria and urgency.        Reports dark colored urine   Musculoskeletal: Negative for falls and myalgias.   Skin: Negative for itching and rash.   Neurological: Negative for dizziness, focal weakness, weakness and headaches.   Psychiatric/Behavioral: Positive for substance abuse. The patient is not  nervous/anxious.        Consultants/Specialty  Gastroenterology    Disposition  Inpatient for upper GI bleed secondary to esophageal varices    Quality Measures    Reviewed items::  Labs reviewed, Medications reviewed and Radiology images reviewed  Larson catheter::  No Larson  DVT prophylaxis pharmacological::  Contraindicated - High bleeding risk  DVT prophylaxis - mechanical:  SCDs  Ulcer Prophylaxis::  Yes  Antibiotics:  Treating active infection/contamination beyond 24 hours perioperative coverage      Physical Exam       Vitals:    10/11/17 0340 10/11/17 0800 10/11/17 1200 10/11/17 1600   BP: 125/81 113/62 112/73 109/67   Pulse: (!) 101 96 60 96   Resp: 17 17 17 18   Temp: 36 °C (96.8 °F) 36.6 °C (97.8 °F) 36.4 °C (97.6 °F) 36.4 °C (97.6 °F)   TempSrc:       SpO2: 97% 96% 97% 98%   Weight:       Height:         Body mass index is 32.08 kg/m². Weight: 104.3 kg (229 lb 15 oz)  Oxygen Therapy:  Pulse Oximetry: 98 %, O2 (LPM): 0, O2 Delivery: None (Room Air)    Physical Exam   Constitutional: He is oriented to person, place, and time and well-developed, well-nourished, and in no distress. No distress.   HENT:   Head: Normocephalic and atraumatic.   Mouth/Throat: No oropharyngeal exudate.   Eyes: Pupils are equal, round, and reactive to light. No scleral icterus.   Neck: Normal range of motion. Neck supple.   Cardiovascular:   No murmur heard.  Sinus tachycardia, no murmur   Pulmonary/Chest: Effort normal and breath sounds normal. No respiratory distress. He has no wheezes.   Abdominal: Soft. Bowel sounds are normal. There is tenderness. There is no rebound and no guarding.   Tenderness over epigastric, left lower quadrant   Musculoskeletal: Normal range of motion. He exhibits no edema or deformity.   Neurological: He is alert and oriented to person, place, and time.   Skin: Skin is warm and dry. He is not diaphoretic.   Psychiatric: Mood and affect normal.     Lab Data Review:   10/10/2017  4:43 PM    Recent Labs     "  10/09/17   1627  10/10/17   0406  10/10/17   0930  10/11/17   0321   SODIUM  134*  135   --   134*   POTASSIUM  3.8  3.9   --   4.2   CHLORIDE  98  102   --   104   CO2  24  24   --   24   BUN  9  9   --   4*   CREATININE  0.51  0.52   --   0.40*   MAGNESIUM   --   1.5  1.7   --    CALCIUM  9.6  8.8   --   9.1       Recent Labs      10/09/17   1627  10/10/17   0406  10/11/17   0321   ALTSGPT  45  33  35   ASTSGOT  161*  131*  159*   ALKPHOSPHAT  198*  155*  164*   TBILIRUBIN  2.9*  3.0*  2.6*   LIPASE  16   --    --    GLUCOSE  138*  118*  158*       Recent Labs      10/09/17   1627  10/10/17   0406  10/11/17   0321   RBC  4.30*  3.62*  3.79*   HEMOGLOBIN  13.8*  11.7*  12.2*   HEMATOCRIT  40.5*  34.1*  35.9*   PLATELETCT  222  171  172   PROTHROMBTM   --   18.1*   --    INR   --   1.45*   --    FERRITIN   --    --   97.4       Recent Labs      10/09/17   1627  10/10/17   0406  10/11/17   0321   WBC  13.3*  11.0*  9.8   NEUTSPOLYS  69.10  60.90   --    LYMPHOCYTES  24.40  30.50   --    MONOCYTES  4.40  5.50   --    EOSINOPHILS  1.00  1.80   --    BASOPHILS  0.70  0.80   --    ASTSGOT  161*  131*  159*   ALTSGPT  45  33  35   ALKPHOSPHAT  198*  155*  164*   TBILIRUBIN  2.9*  3.0*  2.6*           Assessment/Plan     * GI bleed- (present on admission)   Assessment & Plan    Endoscopy yesterday showed \"1. Esophageal varices with bleeding, band ligation 2. Gastric fundic varices 3. Portal hypertensive gastropathy\".   Cceftriaxone was started for SBP ppx; d/c'd following abdominal U/S showed no ascites.  Started on nadolol for variceal ppx; will change to propranolol on discharge.  Prilosec 40 mg QD x 21 days, carafate suspension QID x 14 days, per GI.   Continue to monitor for bleeding. Large bore IV lines in place.          Portal hypertension (CMS-HCC)- (present on admission)   Assessment & Plan    Refer above for details         Alcoholic cirrhosis (CMS-HCC)- (present on admission)   Assessment & Plan    Refer above " for details         Bleeding esophageal varices (CMS-HCC)- (present on admission)   Assessment & Plan    Refer above for details         Liver function test abnormality- (present on admission)   Assessment & Plan    Most likely related to alcoholic liver disease with cirrhosis  Abdominal U/S showed enlarged liver with increased echogenicity which represents fatty infiltration and/or cirrhotic change. Normal hepatopetal flow.  Hepatitis panel negative  Given young age, working up other causes of cirrhosis. Alpha 1 antitrypsin, HITESH, anti-mitochondrial, ceruloplasmin pending          Diabetes mellitus (CMS-HCC)- (present on admission)   Assessment & Plan    - History of uncontrolled DM, last HbA1c in august was 8.4  - Glucose 130's to 150's  - ISS and hypoglycemia protocol if blood sugars are elevated >250        Tachycardia- (present on admission)   Assessment & Plan    - patient reports a history of tachycardia  - He was evaluated by Dr. Reynolds on 09/08- was diagnosed as sinus tachycardia  - Continue to monitor

## 2017-10-12 NOTE — PROGRESS NOTES
Gastroenterology ([facility]) Progress Note     Author: Vincenzo Lr   Date & Time Created: 10/12/2017 8:41 AM    Interval History:  CC: esophageal varices bleed    S: melena and hematemesis resolved after EGD with banding 10/10.  Denies abdominal pain, chest pain, melena.  Tolerating diet.  Hgb stable.      Review of Systems:  Review of Systems   Constitutional: Negative.    Respiratory: Negative.    Cardiovascular: Negative.    Gastrointestinal: Negative for abdominal pain, melena, nausea and vomiting.   Neurological: Negative for tremors.   Psychiatric/Behavioral: Negative.    All other systems reviewed and are negative.      Physical Exam:  Physical Exam   Constitutional: He is oriented to person, place, and time. He appears well-developed and well-nourished. No distress.   HENT:   Head: Normocephalic and atraumatic.   Mouth/Throat: No oropharyngeal exudate.   Eyes: EOM are normal. Pupils are equal, round, and reactive to light. No scleral icterus.   Neck: Normal range of motion. Neck supple. No JVD present. No tracheal deviation present.   Cardiovascular: Normal rate, regular rhythm, normal heart sounds and intact distal pulses.    Pulmonary/Chest: Effort normal and breath sounds normal. No stridor. No respiratory distress.   Abdominal: Soft. Bowel sounds are normal. He exhibits no distension. There is no tenderness. There is no rebound.   Musculoskeletal: Normal range of motion. He exhibits no edema or tenderness.   Neurological: He is alert and oriented to person, place, and time. No cranial nerve deficit. Coordination normal.   Skin: Skin is warm and dry. No rash noted. He is not diaphoretic. No erythema. No pallor.   Psychiatric: He has a normal mood and affect. His behavior is normal. Thought content normal.   Nursing note and vitals reviewed.      Labs:        Invalid input(s): [id number]      Recent Labs      10/10/17   0406  10/10/17   0930  10/11/17   0321  10/12/17   0308   SODIUM  135   --   134*   138   POTASSIUM  3.9   --   4.2  3.7   CHLORIDE  102   --   104  105   CO2  24   --   24  24   BUN  9   --   4*  5*   CREATININE  0.52   --   0.40*  0.55   MAGNESIUM  1.5  1.7   --    --    CALCIUM  8.8   --   9.1  8.5     Recent Labs      10/09/17   1627  10/10/17   0406  10/11/17   0321  10/12/17   0308   ALTSGPT  45  33  35   --    ASTSGOT  161*  131*  159*   --    ALKPHOSPHAT  198*  155*  164*   --    TBILIRUBIN  2.9*  3.0*  2.6*   --    LIPASE  16   --    --    --    GLUCOSE  138*  118*  158*  121*     Recent Labs      10/10/17   0406  10/11/17   0321  10/12/17   0308  10/12/17   0309   RBC  3.62*  3.79*  3.70*   --    HEMOGLOBIN  11.7*  12.2*  11.8*   --    HEMATOCRIT  34.1*  35.9*  35.4*   --    PLATELETCT  171  172  179   --    PROTHROMBTM  18.1*   --    --    --    INR  1.45*   --    --    --    FERRITIN   --   97.4   --   93.6     Recent Labs      10/09/17   1627  10/10/17   0406  10/11/17   0321  10/12/17   0308   WBC  13.3*  11.0*  9.8  12.0*   NEUTSPOLYS  69.10  60.90   --   66.10   LYMPHOCYTES  24.40  30.50   --   28.30   MONOCYTES  4.40  5.50   --   3.90   EOSINOPHILS  1.00  1.80   --   0.50   BASOPHILS  0.70  0.80   --   0.70   ASTSGOT  161*  131*  159*   --    ALTSGPT  45  33  35   --    ALKPHOSPHAT  198*  155*  164*   --    TBILIRUBIN  2.9*  3.0*  2.6*   --      Hemodynamics:  Temp (24hrs), Av.7 °C (98.1 °F), Min:36.4 °C (97.6 °F), Max:37.1 °C (98.8 °F)  Temperature: 36.9 °C (98.5 °F)  Pulse  Av.4  Min: 60  Max: 129   Blood Pressure: 112/85     Respiratory:    Respiration: 18, Pulse Oximetry: 94 %        RUL Breath Sounds: Clear, RML Breath Sounds: Clear, RLL Breath Sounds: Clear, OTTO Breath Sounds: Clear, LLL Breath Sounds: Clear  Fluids:    Intake/Output Summary (Last 24 hours) at 10/11/17 1031  Last data filed at 10/11/17 0700   Gross per 24 hour   Intake             1950 ml   Output             5525 ml   Net            -3575 ml     Weight: 105.5 kg (232 lb 9.4  "oz)  GI/Nutrition:  Orders Placed This Encounter   Procedures   • DIET ORDER     Standing Status:   Standing     Number of Occurrences:   1     Order Specific Question:   Diet:     Answer:   Hepatic [9]     Order Specific Question:   Texture/Fiber modifications:     Answer:   Dysphagia 3(Mechanical Soft)specify fluid consistency(question 6) [3]     Comments:   dental soft       Problems:  1. Esophageal varices with bleeding, band ligation x 7 on 10/10/2017 with good hemostasis  2. Gastric fundic varices, non-bleeding but were large  3. Portal hypertensive gastropathy, mild severity  4. Alcohol induced liver cirrhosis without ascites  5. Abdominal ultrasound 10/11/2017 showed \"Enlarged liver with increased echogenicity which represent fatty infiltration and/or cirrhotic change. No mass or biliary dilatation.  No cholelithiasis. Contracted gallbladder.  Normal appearance of the pancreas.  Normal hepatopedal flow in the portal vein. No portal vein enlargement.  No ascites.  Splenomegaly. Small accessory spleen is present.\"  6. Anemia from acute blood loss  7. Melena and hematemesis, resolved  8. Alcoholism  9. Hypertension.  10. Diabetes mellitus type 2  11.  Dyslipidemia.  12.  Obesity.    Recommendations:  1. Discussed alcohol cessation in detail.  Discussed that liver can improve with alcohol cesstaion.  2. Nadolol for gastric varices and portal hypertensive gastropathy bleeding prophylaxis  3. Prilosec 40 mg QD x 14 days to prevent post-banding ulcer  4. Carafate suspension QID x 14 days to prevent post-banding ulcer  5. Await HITESH, A1AT, ceruloplasmin, fasting ferritin and anti-mitochrondrial antibody due to young age of onset for liver cirrhosis.  6. Continue with atorvastatin due to potential benefits (prevent decompensation and reduces risk of liver cancer) in liver cirrhosis and steatosis  7. Arrange for repeat EGD with banding in 4-6 weeks as outpatient which we will arrange  8. OK for discharge from GI " standpoint      Reviewed items::  Labs reviewed, Medications reviewed and Radiology images reviewed

## 2017-10-12 NOTE — PROGRESS NOTES
Patient is sleeping comfortably in bed, no signs of distress with unlabored breathing. Will continue to monitor for signs and symptoms of bleeding as well as monitor declines in vital signs.

## 2017-10-12 NOTE — PROGRESS NOTES
Discharge instructions given to patient at bedside, verbalizes understanding and states plans for follow-up with GI. Prescriptions sent to pharmacy.  New and home medication review, post-discharge activity level and worsening of symptoms needing follow-up care discussed. Telemetry monitor/IV cathlon removed. All belongings accounted for, all questions answered at this time. Patient left in a car, with family, to go home.

## 2017-10-12 NOTE — PROGRESS NOTES
Report received at bedside, assumed care. Pt is resting in bed. Pain 0/10. No pain in abdomen or trouble eating dinner. No other concerns, complains or distress. Tele box on. Chart reviewed. Bed in lowest position, treaded slipper sock on, and call light within reach. Will monitor for new onset GI symptoms or bleeding.

## 2017-10-12 NOTE — PROGRESS NOTES
Pt resting in room with family at bedside. No complaints of pain or discomfort. Pt expecting to go home today. MD notified pt that discharge is expected this afternoon around 2. Pt okay and ready to go when able to do so. Call light within reach. Even and unlabored respirations.

## 2017-10-12 NOTE — DISCHARGE INSTRUCTIONS
Discharge Instructions    Discharged to home by car with relative. Discharged via walking, hospital escort: Refused.  Special equipment needed: Not Applicable    Be sure to schedule a follow-up appointment with your primary care doctor or any specialists as instructed.     Discharge Plan:   Diet Plan: Discussed  Activity Level: Discussed  Confirmed Follow up Appointment: Appointment Scheduled  Confirmed Symptoms Management: Discussed  Medication Reconciliation Updated: Yes  Influenza Vaccine Indication: Indicated: 9 to 64 years of age  Influenza Vaccine Given - only chart on this line when given: Influenza Vaccine Given (See MAR)    I understand that a diet low in cholesterol, fat, and sodium is recommended for good health. Unless I have been given specific instructions below for another diet, I accept this instruction as my diet prescription.   Other diet:     Special Instructions: None    · Is patient discharged on Warfarin / Coumadin?   No     · Is patient Post Blood Transfusion?  No    Depression / Suicide Risk    As you are discharged from this [facility] facility, it is important to learn how to keep safe from harming yourself.    Recognize the warning signs:  · Abrupt changes in personality, positive or negative- including increase in energy   · Giving away possessions  · Change in eating patterns- significant weight changes-  positive or negative  · Change in sleeping patterns- unable to sleep or sleeping all the time   · Unwillingness or inability to communicate  · Depression  · Unusual sadness, discouragement and loneliness  · Talk of wanting to die  · Neglect of personal appearance   · Rebelliousness- reckless behavior  · Withdrawal from people/activities they love  · Confusion- inability to concentrate     If you or a loved one observes any of these behaviors or has concerns about self-harm, here's what you can do:  · Talk about it- your feelings and reasons for harming yourself  · Remove any means  that you might use to hurt yourself (examples: pills, rope, extension cords, firearm)  · Get professional help from the community (Mental Health, Substance Abuse, psychological counseling)  · Do not be alone:Call your Safe Contact- someone whom you trust who will be there for you.  · Call your local CRISIS HOTLINE 699-2137 or 857-115-4325  · Call your local Children's Mobile Crisis Response Team [place] (532) 696-2374 or wwwKAYAK  · Call the toll free National Suicide Prevention Hotlines   · National Suicide Prevention Lifeline 411-587-GASO (8077)  · National Hope Line Network 800-SUICIDE (560-4336)        Gastrointestinal Bleeding  Gastrointestinal (GI) bleeding means there is bleeding somewhere along the digestive tract, between the mouth and anus.  CAUSES   There are many different problems that can cause GI bleeding. Possible causes include:  · Esophagitis. This is inflammation, irritation, or swelling of the esophagus.  · Hemorrhoids. These are veins that are full of blood (engorged) in the rectum. They cause pain, inflammation, and may bleed.  · Anal fissures. These are areas of painful tearing which may bleed. They are often caused by passing hard stool.  · Diverticulosis. These are pouches that form on the colon over time, with age, and may bleed significantly.  · Diverticulitis. This is inflammation in areas with diverticulosis. It can cause pain, fever, and bloody stools, although bleeding is rare.  · Polyps and cancer. Colon cancer often starts out as precancerous polyps.  · Gastritis and ulcers. Bleeding from the upper gastrointestinal tract (near the stomach) may travel through the intestines and produce black, sometimes tarry, often bad smelling stools. In certain cases, if the bleeding is fast enough, the stools may not be black, but red. This condition may be life-threatening.  SYMPTOMS   · Vomiting bright red blood or material that looks like coffee grounds.  · Bloody, black, or  tarry stools.  DIAGNOSIS   Your caregiver may diagnose your condition by taking your history and performing a physical exam. More tests may be needed, including:  · X-rays and other imaging tests.  · Esophagogastroduodenoscopy (EGD). This test uses a flexible, lighted tube to look at your esophagus, stomach, and small intestine.  · Colonoscopy. This test uses a flexible, lighted tube to look at your colon.  TREATMENT   Treatment depends on the cause of your bleeding.   · For bleeding from the esophagus, stomach, small intestine, or colon, the caregiver doing your EGD or colonoscopy may be able to stop the bleeding as part of the procedure.  · Inflammation or infection of the colon can be treated with medicines.  · Many rectal problems can be treated with creams, suppositories, or warm baths.  · Surgery is sometimes needed.  · Blood transfusions are sometimes needed if you have lost a lot of blood.  If bleeding is slow, you may be allowed to go home. If there is a lot of bleeding, you will need to stay in the hospital for observation.  HOME CARE INSTRUCTIONS   · Take any medicines exactly as prescribed.  · Keep your stools soft by eating foods that are high in fiber. These foods include whole grains, legumes, fruits, and vegetables. Prunes (1 to 3 a day) work well for many people.  · Drink enough fluids to keep your urine clear or pale yellow.  SEEK IMMEDIATE MEDICAL CARE IF:   · Your bleeding increases.  · You feel lightheaded, weak, or you faint.  · You have severe cramps in your back or abdomen.  · You pass large blood clots in your stool.  · Your problems are getting worse.  MAKE SURE YOU:   · Understand these instructions.  · Will watch your condition.  · Will get help right away if you are not doing well or get worse.     This information is not intended to replace advice given to you by your health care provider. Make sure you discuss any questions you have with your health care provider.     Document  Released: 12/15/2001 Document Revised: 12/04/2013 Document Reviewed: 11/26/2012  [employer] Interactive Patient Education ©2016 [employer]        Esophageal Varices  The esophagus is the passage that connects the throat to the stomach. Esophageal varices are blood vessels in the esophagus that have become enlarged. They develop when extra blood is forced to flow through them because the blood's normal pathway is blocked. Without treatment these blood vessels eventually break and bleed (hemorrhage). A hemorrhage is life-threatening.  CAUSES  This condition may be caused by:  · Scarring of the liver (cirrhosis) due to alcoholism. This is the most common cause.  · Liver disease.  · Severe heart failure.  · A blood clot in the portal vein.  · Sarcoidosis. This is an inflammatory disease that can affect the liver.  · Schistosomiasis. This is a parasitic infection that can cause liver damage.  SYMPTOMS  Usually there are no symptoms unless the esophageal varices bleed. Symptoms of bleeding esophageal varices include:  · Vomiting material that is bright red or that is black and looks like coffee grounds.  · Coughing up blood.  · Black, tarry stools.  · Dizziness or lightheadedness.  · Low blood pressure.  · Loss of consciousness.  DIAGNOSIS  This condition is diagnosed with tests, such as:  · Endoscopy. During this test a thin, lighted tube is inserted through the mouth and into the esophagus.  · Blood tests. These may be done to check liver function, blood counts, and the body's ability to form blood clots.  TREATMENT  This condition may be treated with:  · Medicines that reduce pressure in the esophageal varices and reduce the risk of bleeding.  · Procedures to reduce pressure in the esophageal varices and reduce the risk of bleeding or stop bleeding. These include:  ¨ Variceal ligation. In this procedure, a rubber band is placed around the esophageal varices to keep them from bleeding.  ¨ Injection therapy. This  treatment involves an injection that causes the esophageal varices to shrink and close (sclerotherapy). Medicines that tighten blood vessels or alter blood flow may also be used.  ¨ Balloon tamponade. In this procedure, a tube is put into the esophagus and a balloon is passed through it and inflated.  ¨ Transjugular intrahepatic portosystemic shunt (TIPS) placement. In this procedure, a small tube is placed within the liver veins. This decreases blood flow and pressure to the esophageal varices.  · A liver transplant. This may be done if other treatments do not work.  HOME CARE INSTRUCTIONS  · Take medicines only as directed by your health care provider.  · Follow your health care provider's instructions about rest and physical activity.  SEEK IMMEDIATE MEDICAL CARE IF:  · You have any symptoms of this condition after treatment.  · You are unable to eat or drink.  · You have chest pain.     This information is not intended to replace advice given to you by your health care provider. Make sure you discuss any questions you have with your health care provider.     Document Released: 03/09/2005 Document Revised: 05/03/2016 Document Reviewed: 12/14/2015  [employer] Interactive Patient Education ©2016 [employer]      Pneumococcal Vaccine, Polyvalent solution for injection  What is this medicine?  PNEUMOCOCCAL VACCINE, POLYVALENT (TEMO mo CHEMO al george MANRIQUE, nash preston arita) is a vaccine to prevent pneumococcus bacteria infection. These bacteria are a major cause of ear infections, Strep throat infections, and serious pneumonia, meningitis, or blood infections worldwide. These vaccines help the body to produce antibodies (protective substances) that help your body defend against these bacteria. This vaccine is recommended for people 2 years of age and older with health problems. It is also recommended for all adults over 50 years old. This vaccine will not treat an infection.  This medicine may be used for other purposes;  ask your health care provider or pharmacist if you have questions.  COMMON BRAND NAME(S): Pneumovax 23  What should I tell my health care provider before I take this medicine?  They need to know if you have any of these conditions:  -bleeding problems  -bone marrow or organ transplant  -cancer, Hodgkin's disease  -fever  -infection  -immune system problems  -low platelet count in the blood  -seizures  -an unusual or allergic reaction to pneumococcal vaccine, diphtheria toxoid, other vaccines, latex, other medicines, foods, dyes, or preservatives  -pregnant or trying to get pregnant  -breast-feeding  How should I use this medicine?  This vaccine is for injection into a muscle or under the skin. It is given by a health care professional.  A copy of Vaccine Information Statements will be given before each vaccination. Read this sheet carefully each time. The sheet may change frequently.  Talk to your pediatrician regarding the use of this medicine in children. While this drug may be prescribed for children as young as 2 years of age for selected conditions, precautions do apply.  Overdosage: If you think you have taken too much of this medicine contact a poison control center or emergency room at once.  NOTE: This medicine is only for you. Do not share this medicine with others.  What if I miss a dose?  It is important not to miss your dose. Call your doctor or health care professional if you are unable to keep an appointment.  What may interact with this medicine?  -medicines for cancer chemotherapy  -medicines that suppress your immune function  -medicines that treat or prevent blood clots like warfarin, enoxaparin, and dalteparin  -steroid medicines like prednisone or cortisone  This list may not describe all possible interactions. Give your health care provider a list of all the medicines, herbs, non-prescription drugs, or dietary supplements you use. Also tell them if you smoke, drink alcohol, or use illegal  drugs. Some items may interact with your medicine.  What should I watch for while using this medicine?  Mild fever and pain should go away in 3 days or less. Report any unusual symptoms to your doctor or health care professional.  What side effects may I notice from receiving this medicine?  Side effects that you should report to your doctor or health care professional as soon as possible:  -allergic reactions like skin rash, itching or hives, swelling of the face, lips, or tongue  -breathing problems  -confused  -fever over 102 degrees F  -pain, tingling, numbness in the hands or feet  -seizures  -unusual bleeding or bruising  -unusual muscle weakness  Side effects that usually do not require medical attention (report to your doctor or health care professional if they continue or are bothersome):  -aches and pains  -diarrhea  -fever of 102 degrees F or less  -headache  -irritable  -loss of appetite  -pain, tender at site where injected  -trouble sleeping  This list may not describe all possible side effects. Call your doctor for medical advice about side effects. You may report side effects to FDA at 0-178-FDA-7787.  Where should I keep my medicine?  This does not apply. This vaccine is given in a clinic, pharmacy, doctor's office, or other health care setting and will not be stored at home.  NOTE: This sheet is a summary. It may not cover all possible information. If you have questions about this medicine, talk to your doctor, pharmacist, or health care provider.  © 2014, Elsevier/[employer]. (7/24/2009 2:32:37 PM)

## 2017-10-13 ENCOUNTER — PATIENT OUTREACH (OUTPATIENT)
Dept: HEALTH INFORMATION MANAGEMENT | Facility: OTHER | Age: 27
End: 2017-10-13

## 2017-10-13 LAB
CERULOPLASMIN SERPL-MCNC: 26 MG/DL (ref 17–54)
MITOCHONDRIA M2 IGG SER-ACNC: 2.8 UNITS (ref 0–20)
NUCLEAR IGG SER QL IA: NORMAL
SMA IGG SER-ACNC: 24 UNITS (ref 0–19)
SMOOTH MUSCLE IGG TITR SER: ABNORMAL {TITER}

## 2017-10-14 LAB — NUCLEAR IGG SER QL IA: NORMAL

## 2017-10-15 LAB
BACTERIA BLD CULT: NORMAL
BACTERIA BLD CULT: NORMAL
SIGNIFICANT IND 70042: NORMAL
SIGNIFICANT IND 70042: NORMAL
SITE SITE: NORMAL
SITE SITE: NORMAL
SOURCE SOURCE: NORMAL
SOURCE SOURCE: NORMAL

## 2018-03-09 ENCOUNTER — HOSPITAL ENCOUNTER (INPATIENT)
Facility: MEDICAL CENTER | Age: 28
LOS: 6 days | DRG: 405 | End: 2018-03-15
Attending: EMERGENCY MEDICINE | Admitting: INTERNAL MEDICINE
Payer: COMMERCIAL

## 2018-03-09 ENCOUNTER — RESOLUTE PROFESSIONAL BILLING HOSPITAL PROF FEE (OUTPATIENT)
Dept: MEDSURG UNIT | Facility: MEDICAL CENTER | Age: 28
End: 2018-03-09
Payer: COMMERCIAL

## 2018-03-09 ENCOUNTER — APPOINTMENT (OUTPATIENT)
Dept: RADIOLOGY | Facility: MEDICAL CENTER | Age: 28
DRG: 405 | End: 2018-03-09
Attending: EMERGENCY MEDICINE
Payer: COMMERCIAL

## 2018-03-09 LAB
ABO GROUP BLD: NORMAL
ABO GROUP BLD: NORMAL
ALBUMIN SERPL BCP-MCNC: 2.8 G/DL (ref 3.2–4.9)
ALBUMIN/GLOB SERPL: 1.1 G/DL
ALP SERPL-CCNC: 56 U/L (ref 30–99)
ALT SERPL-CCNC: 20 U/L (ref 2–50)
ANION GAP SERPL CALC-SCNC: 9 MMOL/L (ref 0–11.9)
APTT PPP: 32.7 SEC (ref 24.7–36)
AST SERPL-CCNC: 25 U/L (ref 12–45)
BARCODED ABORH UBTYP: 5100
BARCODED ABORH UBTYP: 6200
BARCODED ABORH UBTYP: 7300
BARCODED ABORH UBTYP: 7300
BARCODED PRD CODE UBPRD: NORMAL
BARCODED UNIT NUM UBUNT: NORMAL
BASOPHILS # BLD AUTO: 0.1 % (ref 0–1.8)
BASOPHILS # BLD: 0.03 K/UL (ref 0–0.12)
BILIRUB SERPL-MCNC: 0.7 MG/DL (ref 0.1–1.5)
BLD GP AB SCN SERPL QL: NORMAL
BUN SERPL-MCNC: 22 MG/DL (ref 8–22)
CA-I SERPL-SCNC: 1.1 MMOL/L (ref 1.1–1.3)
CALCIUM SERPL-MCNC: 8 MG/DL (ref 8.5–10.5)
CHLORIDE SERPL-SCNC: 109 MMOL/L (ref 96–112)
CO2 SERPL-SCNC: 19 MMOL/L (ref 20–33)
COMPONENT FT 8504FT: NORMAL
COMPONENT P 8504P: NORMAL
COMPONENT R 8504R: NORMAL
CREAT SERPL-MCNC: 0.86 MG/DL (ref 0.5–1.4)
EOSINOPHIL # BLD AUTO: 0.22 K/UL (ref 0–0.51)
EOSINOPHIL NFR BLD: 1 % (ref 0–6.9)
ERYTHROCYTE [DISTWIDTH] IN BLOOD BY AUTOMATED COUNT: 51.3 FL (ref 35.9–50)
ETHANOL BLD-MCNC: 0 G/DL
GLOBULIN SER CALC-MCNC: 2.5 G/DL (ref 1.9–3.5)
GLUCOSE BLD-MCNC: 147 MG/DL (ref 65–99)
GLUCOSE SERPL-MCNC: 187 MG/DL (ref 65–99)
HCT VFR BLD AUTO: 11.8 % (ref 42–52)
HGB BLD-MCNC: 3.5 G/DL (ref 14–18)
IMM GRANULOCYTES # BLD AUTO: 0.26 K/UL (ref 0–0.11)
IMM GRANULOCYTES NFR BLD AUTO: 1.1 % (ref 0–0.9)
INR PPP: 1.54 (ref 0.87–1.13)
LACTATE BLD-SCNC: 2.7 MMOL/L (ref 0.5–2)
LACTATE BLD-SCNC: 4.2 MMOL/L (ref 0.5–2)
LIPASE SERPL-CCNC: 21 U/L (ref 11–82)
LYMPHOCYTES # BLD AUTO: 7.17 K/UL (ref 1–4.8)
LYMPHOCYTES NFR BLD: 31.5 % (ref 22–41)
MCH RBC QN AUTO: 23 PG (ref 27–33)
MCHC RBC AUTO-ENTMCNC: 29.7 G/DL (ref 33.7–35.3)
MCV RBC AUTO: 77.6 FL (ref 81.4–97.8)
MONOCYTES # BLD AUTO: 1.25 K/UL (ref 0–0.85)
MONOCYTES NFR BLD AUTO: 5.5 % (ref 0–13.4)
MORPHOLOGY BLD-IMP: NORMAL
NEUTROPHILS # BLD AUTO: 13.85 K/UL (ref 1.82–7.42)
NEUTROPHILS NFR BLD: 60.8 % (ref 44–72)
NRBC # BLD AUTO: 0.05 K/UL
NRBC BLD-RTO: 0.2 /100 WBC
PLATELET # BLD AUTO: 332 K/UL (ref 164–446)
PMV BLD AUTO: 11.9 FL (ref 9–12.9)
POTASSIUM SERPL-SCNC: 3.6 MMOL/L (ref 3.6–5.5)
PRODUCT TYPE UPROD: NORMAL
PROT SERPL-MCNC: 5.3 G/DL (ref 6–8.2)
PROTHROMBIN TIME: 18.2 SEC (ref 12–14.6)
RBC # BLD AUTO: 1.52 M/UL (ref 4.7–6.1)
RH BLD: NORMAL
RH BLD: NORMAL
SODIUM SERPL-SCNC: 137 MMOL/L (ref 135–145)
TROPONIN I SERPL-MCNC: <0.01 NG/ML (ref 0–0.04)
UNIT STATUS USTAT: NORMAL
WBC # BLD AUTO: 22.8 K/UL (ref 4.8–10.8)

## 2018-03-09 PROCEDURE — 86901 BLOOD TYPING SEROLOGIC RH(D): CPT

## 2018-03-09 PROCEDURE — 30243K1 TRANSFUSION OF NONAUTOLOGOUS FROZEN PLASMA INTO CENTRAL VEIN, PERCUTANEOUS APPROACH: ICD-10-PCS | Performed by: EMERGENCY MEDICINE

## 2018-03-09 PROCEDURE — P9034 PLATELETS, PHERESIS: HCPCS | Mod: 91

## 2018-03-09 PROCEDURE — 84484 ASSAY OF TROPONIN QUANT: CPT

## 2018-03-09 PROCEDURE — 700105 HCHG RX REV CODE 258: Performed by: INTERNAL MEDICINE

## 2018-03-09 PROCEDURE — C9113 INJ PANTOPRAZOLE SODIUM, VIA: HCPCS | Performed by: EMERGENCY MEDICINE

## 2018-03-09 PROCEDURE — 700111 HCHG RX REV CODE 636 W/ 250 OVERRIDE (IP): Performed by: EMERGENCY MEDICINE

## 2018-03-09 PROCEDURE — 700105 HCHG RX REV CODE 258: Performed by: EMERGENCY MEDICINE

## 2018-03-09 PROCEDURE — 30243R1 TRANSFUSION OF NONAUTOLOGOUS PLATELETS INTO CENTRAL VEIN, PERCUTANEOUS APPROACH: ICD-10-PCS | Performed by: EMERGENCY MEDICINE

## 2018-03-09 PROCEDURE — 85025 COMPLETE CBC W/AUTO DIFF WBC: CPT

## 2018-03-09 PROCEDURE — 80048 BASIC METABOLIC PNL TOTAL CA: CPT

## 2018-03-09 PROCEDURE — 700111 HCHG RX REV CODE 636 W/ 250 OVERRIDE (IP)

## 2018-03-09 PROCEDURE — 80053 COMPREHEN METABOLIC PANEL: CPT

## 2018-03-09 PROCEDURE — 87040 BLOOD CULTURE FOR BACTERIA: CPT

## 2018-03-09 PROCEDURE — 86900 BLOOD TYPING SEROLOGIC ABO: CPT

## 2018-03-09 PROCEDURE — 82962 GLUCOSE BLOOD TEST: CPT

## 2018-03-09 PROCEDURE — C1751 CATH, INF, PER/CENT/MIDLINE: HCPCS

## 2018-03-09 PROCEDURE — 82330 ASSAY OF CALCIUM: CPT

## 2018-03-09 PROCEDURE — 99291 CRITICAL CARE FIRST HOUR: CPT

## 2018-03-09 PROCEDURE — B543ZZA ULTRASONOGRAPHY OF RIGHT JUGULAR VEINS, GUIDANCE: ICD-10-PCS | Performed by: EMERGENCY MEDICINE

## 2018-03-09 PROCEDURE — 30243N1 TRANSFUSION OF NONAUTOLOGOUS RED BLOOD CELLS INTO CENTRAL VEIN, PERCUTANEOUS APPROACH: ICD-10-PCS | Performed by: EMERGENCY MEDICINE

## 2018-03-09 PROCEDURE — 96368 THER/DIAG CONCURRENT INF: CPT

## 2018-03-09 PROCEDURE — 71045 X-RAY EXAM CHEST 1 VIEW: CPT

## 2018-03-09 PROCEDURE — P9017 PLASMA 1 DONOR FRZ W/IN 8 HR: HCPCS | Mod: 91

## 2018-03-09 PROCEDURE — 700105 HCHG RX REV CODE 258

## 2018-03-09 PROCEDURE — 96366 THER/PROPH/DIAG IV INF ADDON: CPT

## 2018-03-09 PROCEDURE — 36430 TRANSFUSION BLD/BLD COMPNT: CPT

## 2018-03-09 PROCEDURE — P9016 RBC LEUKOCYTES REDUCED: HCPCS

## 2018-03-09 PROCEDURE — 02HV33Z INSERTION OF INFUSION DEVICE INTO SUPERIOR VENA CAVA, PERCUTANEOUS APPROACH: ICD-10-PCS | Performed by: EMERGENCY MEDICINE

## 2018-03-09 PROCEDURE — 83605 ASSAY OF LACTIC ACID: CPT

## 2018-03-09 PROCEDURE — 85730 THROMBOPLASTIN TIME PARTIAL: CPT

## 2018-03-09 PROCEDURE — 770022 HCHG ROOM/CARE - ICU (200)

## 2018-03-09 PROCEDURE — 96375 TX/PRO/DX INJ NEW DRUG ADDON: CPT

## 2018-03-09 PROCEDURE — 80307 DRUG TEST PRSMV CHEM ANLYZR: CPT

## 2018-03-09 PROCEDURE — 36556 INSERT NON-TUNNEL CV CATH: CPT

## 2018-03-09 PROCEDURE — 96365 THER/PROPH/DIAG IV INF INIT: CPT

## 2018-03-09 PROCEDURE — 700111 HCHG RX REV CODE 636 W/ 250 OVERRIDE (IP): Performed by: INTERNAL MEDICINE

## 2018-03-09 PROCEDURE — 83690 ASSAY OF LIPASE: CPT

## 2018-03-09 PROCEDURE — 700102 HCHG RX REV CODE 250 W/ 637 OVERRIDE(OP): Performed by: INTERNAL MEDICINE

## 2018-03-09 PROCEDURE — 86850 RBC ANTIBODY SCREEN: CPT

## 2018-03-09 PROCEDURE — 85610 PROTHROMBIN TIME: CPT

## 2018-03-09 RX ORDER — PANTOPRAZOLE SODIUM 40 MG/10ML
80 INJECTION, POWDER, LYOPHILIZED, FOR SOLUTION INTRAVENOUS ONCE
Status: COMPLETED | OUTPATIENT
Start: 2018-03-09 | End: 2018-03-09

## 2018-03-09 RX ORDER — SODIUM CHLORIDE 9 MG/ML
INJECTION, SOLUTION INTRAVENOUS CONTINUOUS
Status: DISCONTINUED | OUTPATIENT
Start: 2018-03-09 | End: 2018-03-13

## 2018-03-09 RX ORDER — PANTOPRAZOLE SODIUM 40 MG/10ML
40 INJECTION, POWDER, LYOPHILIZED, FOR SOLUTION INTRAVENOUS 2 TIMES DAILY
Status: DISCONTINUED | OUTPATIENT
Start: 2018-03-10 | End: 2018-03-13

## 2018-03-09 RX ORDER — THIAMINE MONONITRATE (VIT B1) 100 MG
100 TABLET ORAL DAILY
Status: DISCONTINUED | OUTPATIENT
Start: 2018-03-10 | End: 2018-03-10

## 2018-03-09 RX ORDER — ONDANSETRON 2 MG/ML
INJECTION INTRAMUSCULAR; INTRAVENOUS
Status: COMPLETED
Start: 2018-03-09 | End: 2018-03-09

## 2018-03-09 RX ORDER — DEXTROSE MONOHYDRATE 25 G/50ML
25 INJECTION, SOLUTION INTRAVENOUS
Status: DISCONTINUED | OUTPATIENT
Start: 2018-03-09 | End: 2018-03-13

## 2018-03-09 RX ORDER — SODIUM CHLORIDE 9 MG/ML
INJECTION, SOLUTION INTRAVENOUS
Status: COMPLETED
Start: 2018-03-09 | End: 2018-03-09

## 2018-03-09 RX ORDER — CEFTRIAXONE 2 G/1
2 INJECTION, POWDER, FOR SOLUTION INTRAMUSCULAR; INTRAVENOUS ONCE
Status: COMPLETED | OUTPATIENT
Start: 2018-03-09 | End: 2018-03-09

## 2018-03-09 RX ORDER — OCTREOTIDE ACETATE 100 UG/ML
100 INJECTION, SOLUTION INTRAVENOUS; SUBCUTANEOUS ONCE
Status: COMPLETED | OUTPATIENT
Start: 2018-03-09 | End: 2018-03-09

## 2018-03-09 RX ORDER — NICOTINE 21 MG/24HR
14 PATCH, TRANSDERMAL 24 HOURS TRANSDERMAL
Status: DISCONTINUED | OUTPATIENT
Start: 2018-03-10 | End: 2018-03-15 | Stop reason: HOSPADM

## 2018-03-09 RX ADMIN — PANTOPRAZOLE SODIUM 80 MG: 40 INJECTION, POWDER, FOR SOLUTION INTRAVENOUS at 16:45

## 2018-03-09 RX ADMIN — ONDANSETRON HYDROCHLORIDE: 2 INJECTION, SOLUTION INTRAMUSCULAR; INTRAVENOUS at 17:00

## 2018-03-09 RX ADMIN — SODIUM CHLORIDE: 9 INJECTION, SOLUTION INTRAVENOUS at 20:33

## 2018-03-09 RX ADMIN — CALCIUM GLUCONATE 1 G: 94 INJECTION, SOLUTION INTRAVENOUS at 20:24

## 2018-03-09 RX ADMIN — SODIUM CHLORIDE 8 MG/HR: 9 INJECTION, SOLUTION INTRAVENOUS at 16:46

## 2018-03-09 RX ADMIN — OCTREOTIDE ACETATE 50 MCG/HR: 200 INJECTION, SOLUTION INTRAVENOUS; SUBCUTANEOUS at 16:46

## 2018-03-09 RX ADMIN — PHYTONADIONE 10 MG: 10 INJECTION, EMULSION INTRAMUSCULAR; INTRAVENOUS; SUBCUTANEOUS at 22:12

## 2018-03-09 RX ADMIN — CEFTRIAXONE SODIUM 2 G: 2 INJECTION, POWDER, FOR SOLUTION INTRAMUSCULAR; INTRAVENOUS at 16:43

## 2018-03-09 RX ADMIN — OCTREOTIDE ACETATE 100 MCG: 100 INJECTION, SOLUTION INTRAVENOUS; SUBCUTANEOUS at 16:45

## 2018-03-09 RX ADMIN — SODIUM CHLORIDE: 9 INJECTION, SOLUTION INTRAVENOUS at 21:00

## 2018-03-09 ASSESSMENT — PATIENT HEALTH QUESTIONNAIRE - PHQ9
1. LITTLE INTEREST OR PLEASURE IN DOING THINGS: NOT AT ALL
2. FEELING DOWN, DEPRESSED, IRRITABLE, OR HOPELESS: NOT AT ALL
SUM OF ALL RESPONSES TO PHQ9 QUESTIONS 1 AND 2: 0
SUM OF ALL RESPONSES TO PHQ QUESTIONS 1-9: 0

## 2018-03-09 ASSESSMENT — PAIN SCALES - GENERAL
PAINLEVEL_OUTOF10: 0
PAINLEVEL_OUTOF10: 0

## 2018-03-09 ASSESSMENT — LIFESTYLE VARIABLES
EVER_SMOKED: NEVER
DO YOU DRINK ALCOHOL: NO
DO YOU DRINK ALCOHOL: NO

## 2018-03-09 NOTE — LETTER
[facility]  [address] [place] [facility] [address]  [phone]     March 15, 2018    Patient: August Samuel   YOB: 1990   Date of Visit: 3/9/2018       To Whom It May Concern:    August Samuel was seen and treated in our department on 3/9/2018 to 3/15/2018.     Sincerely,     Jennifer Newell M.D.

## 2018-03-10 ENCOUNTER — APPOINTMENT (OUTPATIENT)
Dept: RADIOLOGY | Facility: MEDICAL CENTER | Age: 28
DRG: 405 | End: 2018-03-10
Attending: INTERNAL MEDICINE
Payer: COMMERCIAL

## 2018-03-10 PROBLEM — G62.9 PERIPHERAL NEUROPATHY: Status: ACTIVE | Noted: 2018-03-10

## 2018-03-10 LAB
ACTION RANGE TRIGGERED IACRT: YES
ALBUMIN SERPL BCP-MCNC: 2.7 G/DL (ref 3.2–4.9)
ALBUMIN/GLOB SERPL: 1.1 G/DL
ALP SERPL-CCNC: 43 U/L (ref 30–99)
ALT SERPL-CCNC: 18 U/L (ref 2–50)
AMPHET UR QL SCN: NEGATIVE
ANION GAP SERPL CALC-SCNC: 5 MMOL/L (ref 0–11.9)
ANION GAP SERPL CALC-SCNC: 7 MMOL/L (ref 0–11.9)
APTT PPP: 32.2 SEC (ref 24.7–36)
AST SERPL-CCNC: 24 U/L (ref 12–45)
BARBITURATES UR QL SCN: NEGATIVE
BARCODED ABORH UBTYP: 6200
BARCODED PRD CODE UBPRD: NORMAL
BARCODED UNIT NUM UBUNT: NORMAL
BASE EXCESS BLDV CALC-SCNC: -3 MMOL/L (ref -4–3)
BASOPHILS # BLD AUTO: 0.1 % (ref 0–1.8)
BASOPHILS # BLD AUTO: 0.4 % (ref 0–1.8)
BASOPHILS # BLD AUTO: 0.6 % (ref 0–1.8)
BASOPHILS # BLD AUTO: 0.7 % (ref 0–1.8)
BASOPHILS # BLD: 0.02 K/UL (ref 0–0.12)
BASOPHILS # BLD: 0.05 K/UL (ref 0–0.12)
BASOPHILS # BLD: 0.07 K/UL (ref 0–0.12)
BASOPHILS # BLD: 0.07 K/UL (ref 0–0.12)
BENZODIAZ UR QL SCN: NEGATIVE
BILIRUB SERPL-MCNC: 2.1 MG/DL (ref 0.1–1.5)
BODY TEMPERATURE: ABNORMAL DEGREES
BUN SERPL-MCNC: 22 MG/DL (ref 8–22)
BUN SERPL-MCNC: 22 MG/DL (ref 8–22)
BZE UR QL SCN: NEGATIVE
CA-I BLD ISE-SCNC: 1 MMOL/L (ref 1.1–1.3)
CALCIUM SERPL-MCNC: 7.9 MG/DL (ref 8.5–10.5)
CALCIUM SERPL-MCNC: 7.9 MG/DL (ref 8.5–10.5)
CANNABINOIDS UR QL SCN: POSITIVE
CFT BLD TEG: 6 MIN (ref 5–10)
CHLORIDE SERPL-SCNC: 110 MMOL/L (ref 96–112)
CHLORIDE SERPL-SCNC: 112 MMOL/L (ref 96–112)
CLOT ANGLE BLD TEG: 75.6 DEGREES (ref 53–72)
CLOT LYSIS 30M P MA LENFR BLD TEG: 0 % (ref 0–8)
CO2 BLDV-SCNC: 20 MMOL/L (ref 20–33)
CO2 SERPL-SCNC: 22 MMOL/L (ref 20–33)
CO2 SERPL-SCNC: 23 MMOL/L (ref 20–33)
COMPONENT FT 8504FT: NORMAL
CREAT SERPL-MCNC: 0.79 MG/DL (ref 0.5–1.4)
CREAT SERPL-MCNC: 0.8 MG/DL (ref 0.5–1.4)
CT.EXTRINSIC BLD ROTEM: 0.9 MIN (ref 1–3)
EOSINOPHIL # BLD AUTO: 0.02 K/UL (ref 0–0.51)
EOSINOPHIL # BLD AUTO: 0.05 K/UL (ref 0–0.51)
EOSINOPHIL # BLD AUTO: 0.07 K/UL (ref 0–0.51)
EOSINOPHIL # BLD AUTO: 0.19 K/UL (ref 0–0.51)
EOSINOPHIL NFR BLD: 0.1 % (ref 0–6.9)
EOSINOPHIL NFR BLD: 0.4 % (ref 0–6.9)
EOSINOPHIL NFR BLD: 0.6 % (ref 0–6.9)
EOSINOPHIL NFR BLD: 1.8 % (ref 0–6.9)
ERYTHROCYTE [DISTWIDTH] IN BLOOD BY AUTOMATED COUNT: 47.6 FL (ref 35.9–50)
ERYTHROCYTE [DISTWIDTH] IN BLOOD BY AUTOMATED COUNT: 47.7 FL (ref 35.9–50)
ERYTHROCYTE [DISTWIDTH] IN BLOOD BY AUTOMATED COUNT: 48.7 FL (ref 35.9–50)
ERYTHROCYTE [DISTWIDTH] IN BLOOD BY AUTOMATED COUNT: 49.2 FL (ref 35.9–50)
EST. AVERAGE GLUCOSE BLD GHB EST-MCNC: 114 MG/DL
FIBRINOGEN PPP-MCNC: 238 MG/DL (ref 215–460)
GLOBULIN SER CALC-MCNC: 2.5 G/DL (ref 1.9–3.5)
GLUCOSE BLD-MCNC: 138 MG/DL (ref 65–99)
GLUCOSE BLD-MCNC: 141 MG/DL (ref 65–99)
GLUCOSE SERPL-MCNC: 122 MG/DL (ref 65–99)
GLUCOSE SERPL-MCNC: 138 MG/DL (ref 65–99)
HBA1C MFR BLD: 5.6 % (ref 0–5.6)
HCO3 BLDV-SCNC: 19.4 MMOL/L (ref 24–28)
HCT VFR BLD AUTO: 13.6 % (ref 42–52)
HCT VFR BLD AUTO: 17.1 % (ref 42–52)
HCT VFR BLD AUTO: 20.2 % (ref 42–52)
HCT VFR BLD AUTO: 20.3 % (ref 42–52)
HCT VFR BLD CALC: 15 % (ref 42–52)
HGB BLD-MCNC: 4.6 G/DL (ref 14–18)
HGB BLD-MCNC: 5.1 G/DL (ref 14–18)
HGB BLD-MCNC: 5.6 G/DL (ref 14–18)
HGB BLD-MCNC: 6.9 G/DL (ref 14–18)
HGB BLD-MCNC: 7 G/DL (ref 14–18)
IMM GRANULOCYTES # BLD AUTO: 0.04 K/UL (ref 0–0.11)
IMM GRANULOCYTES # BLD AUTO: 0.07 K/UL (ref 0–0.11)
IMM GRANULOCYTES # BLD AUTO: 0.09 K/UL (ref 0–0.11)
IMM GRANULOCYTES # BLD AUTO: 0.1 K/UL (ref 0–0.11)
IMM GRANULOCYTES NFR BLD AUTO: 0.4 % (ref 0–0.9)
IMM GRANULOCYTES NFR BLD AUTO: 0.6 % (ref 0–0.9)
IMM GRANULOCYTES NFR BLD AUTO: 0.7 % (ref 0–0.9)
IMM GRANULOCYTES NFR BLD AUTO: 0.7 % (ref 0–0.9)
INR PPP: 1.44 (ref 0.87–1.13)
INST. QUALIFIED PATIENT IIQPT: YES
LYMPHOCYTES # BLD AUTO: 3.51 K/UL (ref 1–4.8)
LYMPHOCYTES # BLD AUTO: 3.62 K/UL (ref 1–4.8)
LYMPHOCYTES # BLD AUTO: 3.84 K/UL (ref 1–4.8)
LYMPHOCYTES # BLD AUTO: 4.5 K/UL (ref 1–4.8)
LYMPHOCYTES NFR BLD: 25.1 % (ref 22–41)
LYMPHOCYTES NFR BLD: 28.7 % (ref 22–41)
LYMPHOCYTES NFR BLD: 35.6 % (ref 22–41)
LYMPHOCYTES NFR BLD: 36.1 % (ref 22–41)
MAGNESIUM SERPL-MCNC: 1.6 MG/DL (ref 1.5–2.5)
MCF BLD TEG: 71.2 MM (ref 50–70)
MCH RBC QN AUTO: 26.4 PG (ref 27–33)
MCH RBC QN AUTO: 26.9 PG (ref 27–33)
MCH RBC QN AUTO: 29.1 PG (ref 27–33)
MCH RBC QN AUTO: 29.2 PG (ref 27–33)
MCHC RBC AUTO-ENTMCNC: 32.7 G/DL (ref 33.7–35.3)
MCHC RBC AUTO-ENTMCNC: 33.8 G/DL (ref 33.7–35.3)
MCHC RBC AUTO-ENTMCNC: 34.5 G/DL (ref 33.7–35.3)
MCHC RBC AUTO-ENTMCNC: 34.5 G/DL (ref 33.7–35.3)
MCV RBC AUTO: 79.5 FL (ref 81.4–97.8)
MCV RBC AUTO: 80.7 FL (ref 81.4–97.8)
MCV RBC AUTO: 84.4 FL (ref 81.4–97.8)
MCV RBC AUTO: 84.6 FL (ref 81.4–97.8)
METHADONE UR QL SCN: NEGATIVE
MONOCYTES # BLD AUTO: 0.54 K/UL (ref 0–0.85)
MONOCYTES # BLD AUTO: 0.55 K/UL (ref 0–0.85)
MONOCYTES # BLD AUTO: 0.64 K/UL (ref 0–0.85)
MONOCYTES # BLD AUTO: 0.71 K/UL (ref 0–0.85)
MONOCYTES NFR BLD AUTO: 4.4 % (ref 0–13.4)
MONOCYTES NFR BLD AUTO: 4.9 % (ref 0–13.4)
MONOCYTES NFR BLD AUTO: 5.1 % (ref 0–13.4)
MONOCYTES NFR BLD AUTO: 5.2 % (ref 0–13.4)
NEUTROPHILS # BLD AUTO: 5.96 K/UL (ref 1.82–7.42)
NEUTROPHILS # BLD AUTO: 7.32 K/UL (ref 1.82–7.42)
NEUTROPHILS # BLD AUTO: 7.95 K/UL (ref 1.82–7.42)
NEUTROPHILS # BLD AUTO: 9.94 K/UL (ref 1.82–7.42)
NEUTROPHILS NFR BLD: 55.8 % (ref 44–72)
NEUTROPHILS NFR BLD: 57.7 % (ref 44–72)
NEUTROPHILS NFR BLD: 65.2 % (ref 44–72)
NEUTROPHILS NFR BLD: 69.1 % (ref 44–72)
NRBC # BLD AUTO: 0 K/UL
NRBC # BLD AUTO: 0.02 K/UL
NRBC BLD-RTO: 0 /100 WBC
NRBC BLD-RTO: 0.2 /100 WBC
O2/TOTAL GAS SETTING VFR VENT: 21 %
OPIATES UR QL SCN: NEGATIVE
OXYCODONE UR QL SCN: NEGATIVE
PA AA BLD-ACNC: 10.7 %
PA ADP BLD-ACNC: 2.3 %
PCO2 BLDV: 22.9 MMHG (ref 41–51)
PCO2 TEMP ADJ BLDV: 22.1 MMHG (ref 41–51)
PCP UR QL SCN: NEGATIVE
PH BLDV: 7.54 [PH] (ref 7.31–7.45)
PH TEMP ADJ BLDV: 7.55 [PH] (ref 7.31–7.45)
PHOSPHATE SERPL-MCNC: 4.1 MG/DL (ref 2.5–4.5)
PLATELET # BLD AUTO: 113 K/UL (ref 164–446)
PLATELET # BLD AUTO: 137 K/UL (ref 164–446)
PLATELET # BLD AUTO: 164 K/UL (ref 164–446)
PLATELET # BLD AUTO: 176 K/UL (ref 164–446)
PMV BLD AUTO: 11.1 FL (ref 9–12.9)
PMV BLD AUTO: 11.2 FL (ref 9–12.9)
PMV BLD AUTO: 11.3 FL (ref 9–12.9)
PMV BLD AUTO: 11.3 FL (ref 9–12.9)
PO2 BLDV: 24 MMHG (ref 25–40)
PO2 TEMP ADJ BLDV: 22 MMHG (ref 25–40)
POTASSIUM BLD-SCNC: 3.9 MMOL/L (ref 3.6–5.5)
POTASSIUM SERPL-SCNC: 4.1 MMOL/L (ref 3.6–5.5)
POTASSIUM SERPL-SCNC: 4.1 MMOL/L (ref 3.6–5.5)
PRODUCT TYPE UPROD: NORMAL
PROPOXYPH UR QL SCN: NEGATIVE
PROT SERPL-MCNC: 5.2 G/DL (ref 6–8.2)
PROTHROMBIN TIME: 17.2 SEC (ref 12–14.6)
RBC # BLD AUTO: 1.71 M/UL (ref 4.7–6.1)
RBC # BLD AUTO: 2.12 M/UL (ref 4.7–6.1)
RBC # BLD AUTO: 2.37 M/UL (ref 4.7–6.1)
RBC # BLD AUTO: 2.4 M/UL (ref 4.7–6.1)
SAO2 % BLDV: 53 %
SODIUM BLD-SCNC: 144 MMOL/L (ref 135–145)
SODIUM SERPL-SCNC: 139 MMOL/L (ref 135–145)
SODIUM SERPL-SCNC: 140 MMOL/L (ref 135–145)
SPECIMEN DRAWN FROM PATIENT: ABNORMAL
TEG ALGORITHM TGALG: ABNORMAL
TSH SERPL DL<=0.005 MIU/L-ACNC: 0.38 UIU/ML (ref 0.38–5.33)
UNIT STATUS USTAT: NORMAL
WBC # BLD AUTO: 10.7 K/UL (ref 4.8–10.8)
WBC # BLD AUTO: 12.2 K/UL (ref 4.8–10.8)
WBC # BLD AUTO: 12.7 K/UL (ref 4.8–10.8)
WBC # BLD AUTO: 14.4 K/UL (ref 4.8–10.8)

## 2018-03-10 PROCEDURE — 700101 HCHG RX REV CODE 250: Performed by: INTERNAL MEDICINE

## 2018-03-10 PROCEDURE — 700117 HCHG RX CONTRAST REV CODE 255: Performed by: INTERNAL MEDICINE

## 2018-03-10 PROCEDURE — 0W3P8ZZ CONTROL BLEEDING IN GASTROINTESTINAL TRACT, VIA NATURAL OR ARTIFICIAL OPENING ENDOSCOPIC: ICD-10-PCS | Performed by: INTERNAL MEDICINE

## 2018-03-10 PROCEDURE — 500066 HCHG BITE BLOCK, ECT: Performed by: INTERNAL MEDICINE

## 2018-03-10 PROCEDURE — 84132 ASSAY OF SERUM POTASSIUM: CPT

## 2018-03-10 PROCEDURE — 700105 HCHG RX REV CODE 258: Performed by: INTERNAL MEDICINE

## 2018-03-10 PROCEDURE — 83036 HEMOGLOBIN GLYCOSYLATED A1C: CPT

## 2018-03-10 PROCEDURE — 36430 TRANSFUSION BLD/BLD COMPNT: CPT

## 2018-03-10 PROCEDURE — 160029 HCHG SURGERY MINUTES - 1ST 30 MINS LEVEL 4: Performed by: INTERNAL MEDICINE

## 2018-03-10 PROCEDURE — 700111 HCHG RX REV CODE 636 W/ 250 OVERRIDE (IP): Performed by: INTERNAL MEDICINE

## 2018-03-10 PROCEDURE — 700105 HCHG RX REV CODE 258

## 2018-03-10 PROCEDURE — 84443 ASSAY THYROID STIM HORMONE: CPT

## 2018-03-10 PROCEDURE — 85384 FIBRINOGEN ACTIVITY: CPT

## 2018-03-10 PROCEDURE — 93005 ELECTROCARDIOGRAM TRACING: CPT | Performed by: INTERNAL MEDICINE

## 2018-03-10 PROCEDURE — 160048 HCHG OR STATISTICAL LEVEL 1-5: Performed by: INTERNAL MEDICINE

## 2018-03-10 PROCEDURE — 85347 COAGULATION TIME ACTIVATED: CPT

## 2018-03-10 PROCEDURE — 86923 COMPATIBILITY TEST ELECTRIC: CPT

## 2018-03-10 PROCEDURE — 700101 HCHG RX REV CODE 250

## 2018-03-10 PROCEDURE — HZ2ZZZZ DETOXIFICATION SERVICES FOR SUBSTANCE ABUSE TREATMENT: ICD-10-PCS | Performed by: INTERNAL MEDICINE

## 2018-03-10 PROCEDURE — 85025 COMPLETE CBC W/AUTO DIFF WBC: CPT | Mod: 91

## 2018-03-10 PROCEDURE — 160002 HCHG RECOVERY MINUTES (STAT): Performed by: INTERNAL MEDICINE

## 2018-03-10 PROCEDURE — P9016 RBC LEUKOCYTES REDUCED: HCPCS | Mod: 91

## 2018-03-10 PROCEDURE — 93010 ELECTROCARDIOGRAM REPORT: CPT | Performed by: INTERNAL MEDICINE

## 2018-03-10 PROCEDURE — 82330 ASSAY OF CALCIUM: CPT

## 2018-03-10 PROCEDURE — 80053 COMPREHEN METABOLIC PANEL: CPT

## 2018-03-10 PROCEDURE — 160009 HCHG ANES TIME/MIN: Performed by: INTERNAL MEDICINE

## 2018-03-10 PROCEDURE — 700111 HCHG RX REV CODE 636 W/ 250 OVERRIDE (IP)

## 2018-03-10 PROCEDURE — 85730 THROMBOPLASTIN TIME PARTIAL: CPT

## 2018-03-10 PROCEDURE — 83735 ASSAY OF MAGNESIUM: CPT

## 2018-03-10 PROCEDURE — 74177 CT ABD & PELVIS W/CONTRAST: CPT

## 2018-03-10 PROCEDURE — P9017 PLASMA 1 DONOR FRZ W/IN 8 HR: HCPCS

## 2018-03-10 PROCEDURE — 82803 BLOOD GASES ANY COMBINATION: CPT

## 2018-03-10 PROCEDURE — C9113 INJ PANTOPRAZOLE SODIUM, VIA: HCPCS | Performed by: INTERNAL MEDICINE

## 2018-03-10 PROCEDURE — 84295 ASSAY OF SERUM SODIUM: CPT

## 2018-03-10 PROCEDURE — 84100 ASSAY OF PHOSPHORUS: CPT

## 2018-03-10 PROCEDURE — 85576 BLOOD PLATELET AGGREGATION: CPT | Mod: 91

## 2018-03-10 PROCEDURE — 770022 HCHG ROOM/CARE - ICU (200)

## 2018-03-10 PROCEDURE — 82962 GLUCOSE BLOOD TEST: CPT | Mod: 91

## 2018-03-10 PROCEDURE — 160035 HCHG PACU - 1ST 60 MINS PHASE I: Performed by: INTERNAL MEDICINE

## 2018-03-10 PROCEDURE — 160041 HCHG SURGERY MINUTES - EA ADDL 1 MIN LEVEL 4: Performed by: INTERNAL MEDICINE

## 2018-03-10 PROCEDURE — 85610 PROTHROMBIN TIME: CPT

## 2018-03-10 PROCEDURE — 85014 HEMATOCRIT: CPT

## 2018-03-10 RX ORDER — ONDANSETRON 2 MG/ML
INJECTION INTRAMUSCULAR; INTRAVENOUS
Status: COMPLETED
Start: 2018-03-10 | End: 2018-03-10

## 2018-03-10 RX ORDER — THIAMINE MONONITRATE (VIT B1) 100 MG
100 TABLET ORAL DAILY
Status: DISPENSED | OUTPATIENT
Start: 2018-03-10 | End: 2018-03-15

## 2018-03-10 RX ORDER — SODIUM CHLORIDE 9 MG/ML
INJECTION, SOLUTION INTRAVENOUS
Status: COMPLETED
Start: 2018-03-10 | End: 2018-03-11

## 2018-03-10 RX ORDER — MAGNESIUM SULFATE 1 G/100ML
1 INJECTION INTRAVENOUS ONCE
Status: COMPLETED | OUTPATIENT
Start: 2018-03-10 | End: 2018-03-10

## 2018-03-10 RX ORDER — MAGNESIUM SULFATE HEPTAHYDRATE 40 MG/ML
2 INJECTION, SOLUTION INTRAVENOUS ONCE
Status: COMPLETED | OUTPATIENT
Start: 2018-03-10 | End: 2018-03-10

## 2018-03-10 RX ORDER — MAGNESIUM SULFATE HEPTAHYDRATE 40 MG/ML
2 INJECTION, SOLUTION INTRAVENOUS ONCE
Status: DISCONTINUED | OUTPATIENT
Start: 2018-03-10 | End: 2018-03-10

## 2018-03-10 RX ORDER — SODIUM CHLORIDE 9 MG/ML
INJECTION, SOLUTION INTRAVENOUS
Status: COMPLETED
Start: 2018-03-10 | End: 2018-03-10

## 2018-03-10 RX ORDER — METOCLOPRAMIDE HYDROCHLORIDE 5 MG/ML
10 INJECTION INTRAMUSCULAR; INTRAVENOUS EVERY 6 HOURS
Status: DISCONTINUED | OUTPATIENT
Start: 2018-03-10 | End: 2018-03-10

## 2018-03-10 RX ORDER — ONDANSETRON 2 MG/ML
4 INJECTION INTRAMUSCULAR; INTRAVENOUS EVERY 4 HOURS PRN
Status: DISCONTINUED | OUTPATIENT
Start: 2018-03-10 | End: 2018-03-15 | Stop reason: HOSPADM

## 2018-03-10 RX ORDER — FOLIC ACID 1 MG/1
1 TABLET ORAL DAILY
Status: COMPLETED | OUTPATIENT
Start: 2018-03-11 | End: 2018-03-14

## 2018-03-10 RX ADMIN — MAGNESIUM SULFATE IN WATER 2 G: 40 INJECTION, SOLUTION INTRAVENOUS at 07:59

## 2018-03-10 RX ADMIN — IOHEXOL 100 ML: 350 INJECTION, SOLUTION INTRAVENOUS at 16:45

## 2018-03-10 RX ADMIN — SODIUM CHLORIDE: 9 INJECTION, SOLUTION INTRAVENOUS at 00:45

## 2018-03-10 RX ADMIN — SODIUM CHLORIDE 500 ML: 9 INJECTION, SOLUTION INTRAVENOUS at 18:10

## 2018-03-10 RX ADMIN — PANTOPRAZOLE SODIUM 40 MG: 40 INJECTION, POWDER, LYOPHILIZED, FOR SOLUTION INTRAVENOUS at 08:43

## 2018-03-10 RX ADMIN — SODIUM CHLORIDE: 9 INJECTION, SOLUTION INTRAVENOUS at 08:00

## 2018-03-10 RX ADMIN — CALCIUM GLUCONATE 1 G: 94 INJECTION, SOLUTION INTRAVENOUS at 16:57

## 2018-03-10 RX ADMIN — PANTOPRAZOLE SODIUM 40 MG: 40 INJECTION, POWDER, LYOPHILIZED, FOR SOLUTION INTRAVENOUS at 20:37

## 2018-03-10 RX ADMIN — SODIUM CHLORIDE: 9 INJECTION, SOLUTION INTRAVENOUS at 11:00

## 2018-03-10 RX ADMIN — MAGNESIUM SULFATE IN DEXTROSE 1 G: 10 INJECTION, SOLUTION INTRAVENOUS at 05:53

## 2018-03-10 RX ADMIN — METOCLOPRAMIDE 10 MG: 5 INJECTION, SOLUTION INTRAMUSCULAR; INTRAVENOUS at 11:45

## 2018-03-10 RX ADMIN — CEFTRIAXONE 2 G: 2 INJECTION, POWDER, FOR SOLUTION INTRAMUSCULAR; INTRAVENOUS at 18:09

## 2018-03-10 RX ADMIN — SODIUM CHLORIDE: 9 INJECTION, SOLUTION INTRAVENOUS at 07:58

## 2018-03-10 RX ADMIN — ONDANSETRON HYDROCHLORIDE 4 MG: 2 INJECTION, SOLUTION INTRAMUSCULAR; INTRAVENOUS at 11:03

## 2018-03-10 RX ADMIN — FOLIC ACID: 5 INJECTION, SOLUTION INTRAMUSCULAR; INTRAVENOUS; SUBCUTANEOUS at 16:58

## 2018-03-10 RX ADMIN — SODIUM CHLORIDE: 9 INJECTION, SOLUTION INTRAVENOUS at 03:15

## 2018-03-10 RX ADMIN — OCTREOTIDE ACETATE 50 MCG/HR: 200 INJECTION, SOLUTION INTRAVENOUS; SUBCUTANEOUS at 18:46

## 2018-03-10 RX ADMIN — SODIUM CHLORIDE: 9 INJECTION, SOLUTION INTRAVENOUS at 18:10

## 2018-03-10 ASSESSMENT — ENCOUNTER SYMPTOMS
BLOOD IN STOOL: 0
SEIZURES: 0
HEMOPTYSIS: 0
NECK PAIN: 0
NAUSEA: 1
ABDOMINAL PAIN: 1
CLAUDICATION: 0
EYES NEGATIVE: 1
DIARRHEA: 0
INSOMNIA: 1
NERVOUS/ANXIOUS: 0
DIAPHORESIS: 0
FEVER: 0
SHORTNESS OF BREATH: 0
WHEEZING: 0
CONSTIPATION: 0
VOMITING: 0
NAUSEA: 0
WEAKNESS: 1
COUGH: 0
SENSORY CHANGE: 0
CHILLS: 0
BLOOD IN STOOL: 1
VOMITING: 1
SPEECH CHANGE: 0
HEADACHES: 0
PALPITATIONS: 0
HEARTBURN: 0
INSOMNIA: 0
SPUTUM PRODUCTION: 0
SORE THROAT: 0
FOCAL WEAKNESS: 0
MUSCULOSKELETAL NEGATIVE: 1

## 2018-03-10 ASSESSMENT — PAIN SCALES - GENERAL
PAINLEVEL_OUTOF10: 8
PAINLEVEL_OUTOF10: 2
PAINLEVEL_OUTOF10: 2
PAINLEVEL_OUTOF10: 0
PAINLEVEL_OUTOF10: 2
PAINLEVEL_OUTOF10: 2
PAINLEVEL_OUTOF10: 0
PAINLEVEL_OUTOF10: 2
PAINLEVEL_OUTOF10: 2
PAINLEVEL_OUTOF10: 0

## 2018-03-10 NOTE — PROGRESS NOTES
Gastroenterology Progress Note     Author: Vincenzo Lr   Date & Time Created: 3/10/2018 8:43 AM    Interval History:  3/10/2018: Denies further hematemesis or melena overnight.  Mild epigastric dyspepsia.  Has received 5U PRBC, 3U FFP and platelets overnight.  Current Hgb 5.    Chief Complaint:  hematemesis    Review of Systems:  Review of Systems   Constitutional: Negative for chills and fever.   Respiratory: Negative for shortness of breath.    Cardiovascular: Negative for chest pain.   Gastrointestinal: Positive for abdominal pain and melena. Negative for blood in stool, constipation, diarrhea, nausea and vomiting.       Physical Exam:  Physical Exam   Constitutional: He is oriented to person, place, and time. He appears well-developed and well-nourished.   Cardiovascular: Normal rate and regular rhythm.    Pulmonary/Chest: Effort normal and breath sounds normal.   Abdominal: Soft. Bowel sounds are normal. He exhibits no distension. There is no tenderness.   Musculoskeletal: He exhibits no edema.   Neurological: He is alert and oriented to person, place, and time.   Nursing note and vitals reviewed.      Labs:        Invalid input(s): [id number]  Recent Labs      03/09/18   1600   TROPONINI  <0.01     Recent Labs      03/09/18   1600  03/09/18   2345  03/10/18   0457   SODIUM  137  139  140   POTASSIUM  3.6  4.1  4.1   CHLORIDE  109  110  112   CO2  19*  22  23   BUN  22  22  22   CREATININE  0.86  0.79  0.80   MAGNESIUM   --    --   1.6   PHOSPHORUS   --    --   4.1   CALCIUM  8.0*  7.9*  7.9*     Recent Labs      03/09/18   1600  03/09/18   2345  03/10/18   0457   ALTSGPT  20   --   18   ASTSGOT  25   --   24   ALKPHOSPHAT  56   --   43   TBILIRUBIN  0.7   --   2.1*   LIPASE  21   --    --    GLUCOSE  187*  138*  122*     Recent Labs      03/09/18   1600  03/09/18   2345  03/10/18   0457   RBC  1.52*  1.71*  2.12*   HEMOGLOBIN  3.5*  4.6*  5.6*   HEMATOCRIT  11.8*  13.6*  17.1*   PLATELETCT  332   176  164   PROTHROMBTM  18.2*   --   17.2*   APTT  32.7   --   32.2   INR  1.54*   --   1.44*     Recent Labs      18   1600  18   2345  03/10/18   0457   WBC  22.8*  14.4*  12.7*   NEUTSPOLYS  60.80  69.10  57.70   LYMPHOCYTES  31.50  25.10  35.60   MONOCYTES  5.50  4.90  5.10   EOSINOPHILS  1.00  0.10  0.60   BASOPHILS  0.10  0.10  0.40   ASTSGOT  25   --   24   ALTSGPT  20   --   18   ALKPHOSPHAT  56   --   43   TBILIRUBIN  0.7   --   2.1*     Hemodynamics:  Temp (24hrs), Av.7 °C (98.1 °F), Min:36.2 °C (97.2 °F), Max:37.6 °C (99.7 °F)  Temperature: 36.2 °C (97.2 °F)  Pulse  Av.8  Min: 86  Max: 134Heart Rate (Monitored): 86  Blood Pressure: 105/60, NIBP: (!) 95/48     Respiratory:    Respiration: 18, Pulse Oximetry: 99 % (Room air)        RUL Breath Sounds: Clear, RML Breath Sounds: Clear, RLL Breath Sounds: Diminished, OTTO Breath Sounds: Clear, LLL Breath Sounds: Diminished  Fluids:    Intake/Output Summary (Last 24 hours) at 03/10/18 0843  Last data filed at 03/10/18 0400   Gross per 24 hour   Intake           826.17 ml   Output              825 ml   Net             1.17 ml     Weight: 95.9 kg (211 lb 6.7 oz)  GI/Nutrition:  Orders Placed This Encounter   Procedures   • Diet NPO     Standing Status:   Standing     Number of Occurrences:   1     Order Specific Question:   Restrict to:     Answer:   Strict [1]     Medical Decision Making, by Problem:  Active Hospital Problems    Diagnosis   • Alcoholic cirrhosis (CMS-HCC) [K70.30]   • Esophageal varices with bleeding (CMS-HCC) [I85.01]   • Diabetes mellitus (CMS-HCC) [E11.9]     PROBLEMS:  1. Hematemesis.  Suspect variceal bleed, ?esophageal or gastric varices (had both on EGD 10/2017)  2. Acute blood loss anemia, severe  3. Cirrhosis, secondary to alcohol versus [place].  No prior liver biopsy.  Stopped alcohol 10/2017  4. Pancytopenia secondary to cirrhosis  5. Coagulopathy secondary to cirrhosis  6. Diabetes mellitus  7. Hyperlipidemia  8.  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 Plan for EGD with anesthesia later today  2. Agree with additional blood transfusion  3. Continue octreotide and Protonix drips  4. Continue antibiotics  5. If has persistent esophageal varices with gastric varices or actively bleeding gastric varices, then consider TIPS given recurrent bleeding episodes.  Discussed TIPS and risks and benefits with patient.  Would need CTA and echo prior to TIPS placement    Quality-Core Measures   Reviewed items::  Labs reviewed and Medications reviewed

## 2018-03-10 NOTE — PROGRESS NOTES
2 RN assessment completed with Kristal. Skin intact. Pt turns self side to side. Pictures not needed.  Wound consult not needed.

## 2018-03-10 NOTE — ED NOTES
Pt vomited about 1200 cc bright red blood. Reports lightheadedness, HA . Remains extremely pale. BP 90s systolic. Dr. Chapman called to bedside.

## 2018-03-10 NOTE — PROGRESS NOTES
1050 Pt had 1 L large bright red hematemesis with large clots the size of baseball. BP stable with SBP in 100's and MAP >65 mmHg. Paged Dr. Lr and notified Dr. Albert. Ordered 2 PRBC and 3 FFP.      1115 Updated Dr. Lr. Pt scheduled to go to OR in 1200. Ordered Reglan.     1140 Infused FFP and PRBC.     1200 Pt had loose dark stool. Notified Dr Lr    1215 Pt transferred pre-op with this RN, wife, Jessie, and transport    1220 Pt had 1.5 L of bright red hematemesis in pre-op. Dr. Lr at bedside. Report given to anaesthesiologist. SBP was 100's with MAP in the 60's, pt on RA with O2 sat in the high 90's.    1240 This RN left pt's bedside. Report given to OR nurse. All questions answered.

## 2018-03-10 NOTE — H&P
Internal Medicine Admitting History and Physical    Note Author: Lance Melo M.D.       Name August Samuel     1990   Age/Sex 27 y.o. male   MRN [id number]   Code Status full     After 5PM or if no immediate response to page, please call for cross-coverage  Attending/Team: Dr. Carias/Abisai See Patient List for primary contact information  Call [phone] to page    1st Call - Day Intern (R1):   Krys 2nd Call - Day Sr. Resident (R2/R3):   Krys       Chief Complaint:  Passed with, bloody vomitus and dark stool     HPI:  27 yr old M with Hx of cirrhosis (alcohol, fatty liver), type 2 DM, elevated cholesterol, and neuropathy presented to ED due to presyncope and GI bleeding.     The patient was admitted in 10/2017 due to acute GI bleeding, scope showed EV GV and portal hypertension gastritis. Ultrasound showed fatty liver. Discharged with PPI and did not have regular GI follow up due to insurance issue. He is completely no alcohol since discharge.     On , he started feeling not good, some mild GI discomfort and mild nausea. Symptoms got worse a bit on Thur and he felt tired and no activity. On , bloody vomitus with some blood clots 2 times and also dark stool at home. Nearly syncope and he was sent to ED.     The patient had Hgb 3 at ED, alert and no evidence of encephalopathy. Central line done, 1unit of RBC, PLT, FFP done. GI team (Dr. Mendoza) saw the patient, plan to do scope tmr morning, but urgent scope overnight might be still needed. He also understand we might intubate him overnight if major vomiting happens again to protect his airway.    ROS   Constitutional: Denies fevers. Tired.   Eyes: Denies changes in vision, no eye pain  Ears/Nose/Throat/Mouth: Denies nasal congestion or sore throat   Cardiovascular: Denies chest pain or palpitations   Respiratory: Denies shortness of breath , Denies cough  Gastrointestinal/Hepatic: epigastric discomfort, mild pain. N/V with blood and  dark stool.   Genitourinary: Denies bladder dysfunction, dysuria or frequency  Musculoskeletal/Rheum: No complaints   Skin/Breast: Denies rash   Neurological: Denies headache. Denied focal weakness/parasthesias  Psychiatric: No complaints  All other systems were reviewed and are negative (AMA/CMS criteria)            Past Medical History:   Past Medical History:   Diagnosis Date   • Cirrhosis of liver (CMS-HCC)    • Esophageal varices (CMS-HCC)    • GI bleed        Past Surgical History:  Past Surgical History:   Procedure Laterality Date   • GASTROSCOPY  10/10/2017    Procedure: EGD W/LIGATE/BAND ESOPH/VARICE [[id number]];  Surgeon: Ottoniel Avelar M.D.;  Location: SURGERY [facility];  Service: Gastroenterology       Current Outpatient Medications:  Home Medications     Reviewed by Gildardo Donahue (Pharmacy Tech) on 03/09/18 at 1838  Med List Status: Complete   Medication Last Dose Status   atorvastatin (LIPITOR) 20 MG Tab 3/8/2018 Active   metformin (GLUCOPHAGE) 500 MG Tab 3/8/2018 Active                Medication Allergy/Sensitivities:  No Known Allergies      Family History:  History reviewed. No pertinent family history.    Social History:  Social History     Social History   • Marital status: Single     Spouse name: N/A   • Number of children: N/A   • Years of education: N/A     Occupational History   • Not on file.     Social History Main Topics   • Smoking status: Never Smoker   • Smokeless tobacco: Current User   • Alcohol use No      Comment: stopped in October   • Drug use: No   • Sexual activity: Not on file     Other Topics Concern   • Not on file     Social History Narrative   • No narrative on file     Living situation: with wife  PCP : Dr. Barnes    Physical Exam     Vitals:    03/09/18 1730 03/09/18 1745 03/09/18 1800 03/09/18 1803   BP:       Pulse: (!) 114 (!) 113 (!) 121    Resp:       Temp:       SpO2: 98% 100% 100%    Weight:    90.7 kg (200 lb)     Body mass index is 27.91 kg/m².  BP  116/46   Pulse (!) 121   Temp 36.9 °C (98.4 °F)   Resp 16   Wt 90.7 kg (200 lb)   SpO2 100%   BMI 27.91 kg/m²   O2 therapy: Pulse Oximetry: 100 %    Physical Exam  HEENT: Pale  Cardiovascular: Normal heart rate, Normal rhythm   Lungs: Respiratory effort is normal. Normal breath sounds  Abdomen: Mild tenderness at epigastric area, no guarding, no rebounding pain   Skin: No erythema, No rash  Lower limbs: normal, no pitting edema   Neurologic: Alert & oriented x 3, Normal motor function, Normal sensory function, No focal deficits noted, cranial nerves II through XII are normal  PSY: stable mood.   Other systems also examined, grossly normal.           Data Review       Old Records Request:   Completed  Current Records review and summary: Completed    Lab Data Review:  Recent Results (from the past 24 hour(s))   MASSIVE TRANSFUSION    Collection Time: 03/09/18 12:00 AM   Result Value Ref Range    Component P       P2                  Plts,Pheresis       [id number]   issued       03/09/18   17:17      Product Type Platelets  Pheresis LR     Dispense Status Issued     Unit Number (Barcoded) [id number]     Product Code (Barcoded) U3999I57     Blood Type (Barcoded) 7300     Component Ft       FPT                 Plasma, Thawed      [id number]   issued       03/09/18   17:17      Product Type Plasma  Thawed     Dispense Status Issued     Unit Number (Barcoded) [id number]     Product Code (Barcoded) U9773V82     Blood Type (Barcoded) 6200     Component Ft       FPT                 Plasma, Thawed      [id number]   issued       03/09/18   17:17      Product Type Plasma  Thawed     Dispense Status Issued     Unit Number (Barcoded) [id number]     Product Code (Barcoded) M0363U85     Blood Type (Barcoded) 6200     Component Ft       FPT                 Plasma, Thawed      [id number]   issued       03/09/18   17:17      Product Type Plasma  Thawed     Dispense Status Issued     Unit Number (Barcoded)  [id number]     Product Code (Barcoded) W3615Y35     Blood Type (Barcoded) 6200     Component Ft       TA1                 Thawed Plasma 1     [id number]   issued       03/09/18   17:17      Product Type Thawed Apheresis Plasma 1st Cont     Dispense Status Issued     Unit Number (Barcoded) [id number]     Product Code (Barcoded) P0379X20     Blood Type (Barcoded) 6200     Component R       R4                  Red Blood Cells     [id number]   issued       03/09/18   17:17      Product Type Red Blood Cells LR Pheresis     Dispense Status Issued     Unit Number (Barcoded) [id number]     Product Code (Barcoded) X3073E62     Blood Type (Barcoded) 5100     Component R       R7                  Red Blood Cells7    [id number]   issued       03/09/18   17:17      Product Type Red Blood Cells LR Pheresis     Dispense Status Issued     Unit Number (Barcoded) [id number]     Product Code (Barcoded) N2444I14     Blood Type (Barcoded) 5100     Component R       R3.                 Red Blood Cells     [id number]   issued       03/09/18   17:17      Product Type Red Blood Cells  LR Pheresis     Dispense Status Issued     Unit Number (Barcoded) [id number]     Product Code (Barcoded) O1171D33     Blood Type (Barcoded) 5100     Component R       R7                  Red Blood Cells7    [id number]   issued       03/09/18   17:17      Product Type Red Blood Cells LR Pheresis     Dispense Status Issued     Unit Number (Barcoded) [id number]     Product Code (Barcoded) Z9453U52     Blood Type (Barcoded) 5100    COD (ADULT)    Collection Time: 03/09/18  3:55 PM   Result Value Ref Range    ABO Grouping Only O     Rh Grouping Only POS     Antibody Screen-Cod NEG    CBC WITH DIFFERENTIAL    Collection Time: 03/09/18  4:00 PM   Result Value Ref Range    WBC 22.8 (H) 4.8 - 10.8 K/uL    RBC 1.52 (L) 4.70 - 6.10 M/uL    Hemoglobin 3.5 (LL) 14.0 - 18.0 g/dL    Hematocrit 11.8 (LL) 42.0 - 52.0 %    MCV 77.6 (L) 81.4 -  97.8 fL    MCH 23.0 (L) 27.0 - 33.0 pg    MCHC 29.7 (L) 33.7 - 35.3 g/dL    RDW 51.3 (H) 35.9 - 50.0 fL    Platelet Count 332 164 - 446 K/uL    MPV 11.9 9.0 - 12.9 fL    Nucleated RBC 0.20 /100 WBC    NRBC (Absolute) 0.05 K/uL    Neutrophils-Polys 60.80 44.00 - 72.00 %    Lymphocytes 31.50 22.00 - 41.00 %    Monocytes 5.50 0.00 - 13.40 %    Eosinophils 1.00 0.00 - 6.90 %    Basophils 0.10 0.00 - 1.80 %    Immature Granulocytes 1.10 (H) 0.00 - 0.90 %    Neutrophils (Absolute) 13.85 (H) 1.82 - 7.42 K/uL    Lymphs (Absolute) 7.17 (H) 1.00 - 4.80 K/uL    Monos (Absolute) 1.25 (H) 0.00 - 0.85 K/uL    Eos (Absolute) 0.22 0.00 - 0.51 K/uL    Baso (Absolute) 0.03 0.00 - 0.12 K/uL    Immature Granulocytes (abs) 0.26 (H) 0.00 - 0.11 K/uL   COMP METABOLIC PANEL    Collection Time: 03/09/18  4:00 PM   Result Value Ref Range    Sodium 137 135 - 145 mmol/L    Potassium 3.6 3.6 - 5.5 mmol/L    Chloride 109 96 - 112 mmol/L    Co2 19 (L) 20 - 33 mmol/L    Anion Gap 9.0 0.0 - 11.9    Glucose 187 (H) 65 - 99 mg/dL    Bun 22 8 - 22 mg/dL    Creatinine 0.86 0.50 - 1.40 mg/dL    Calcium 8.0 (L) 8.5 - 10.5 mg/dL    AST(SGOT) 25 12 - 45 U/L    ALT(SGPT) 20 2 - 50 U/L    Alkaline Phosphatase 56 30 - 99 U/L    Total Bilirubin 0.7 0.1 - 1.5 mg/dL    Albumin 2.8 (L) 3.2 - 4.9 g/dL    Total Protein 5.3 (L) 6.0 - 8.2 g/dL    Globulin 2.5 1.9 - 3.5 g/dL    A-G Ratio 1.1 g/dL   LIPASE    Collection Time: 03/09/18  4:00 PM   Result Value Ref Range    Lipase 21 11 - 82 U/L   TROPONIN    Collection Time: 03/09/18  4:00 PM   Result Value Ref Range    Troponin I <0.01 0.00 - 0.04 ng/mL   PROTHROMBIN TIME    Collection Time: 03/09/18  4:00 PM   Result Value Ref Range    PT 18.2 (H) 12.0 - 14.6 sec    INR 1.54 (H) 0.87 - 1.13   APTT    Collection Time: 03/09/18  4:00 PM   Result Value Ref Range    APTT 32.7 24.7 - 36.0 sec   ABO AND RH CONFIRMATION    Collection Time: 03/09/18  4:00 PM   Result Value Ref Range    ABO Confirm O     Second Rh Group POS       ESTIMATED GFR    Collection Time: 03/09/18  4:00 PM   Result Value Ref Range    GFR If African American >60 >60 mL/min/1.73 m 2    GFR If Non African American >60 >60 mL/min/1.73 m 2   PERIPHERAL SMEAR REVIEW    Collection Time: 03/09/18  4:00 PM   Result Value Ref Range    Peripheral Smear Review see below    LACTIC ACID    Collection Time: 03/09/18  6:25 PM   Result Value Ref Range    Lactic Acid 4.2 (HH) 0.5 - 2.0 mmol/L   PLATELETS REQUEST    Collection Time: 03/09/18  6:41 PM   Result Value Ref Range    Component P       PTP                 Plts,Pheresis       [id number]   selected     03/09/18   18:43      Product Type Platelets  Pheresis LR     Dispense Status Selected     Unit Number (Barcoded) [id number]     Product Code (Barcoded) W3099X63     Blood Type (Barcoded) 7300        Imaging/Procedures Review:    ndependant Imaging Review: Completed  DX-CHEST-PORTABLE (1 VIEW)   Final Result      1.  No acute cardiopulmonary disease.   2.  Supportive tubing as described above.               EKG:   Not done   Sinus tachy on tele monitor.            Assessment/Plan     Alcoholic cirrhosis (CMS-HCC)- (present on admission)   Assessment & Plan    Started drinking around 19, completely quitted in 10/2017, no major withdrawal hx before.   Ultrasound 10/2017, fatty liver.   Scope 10/2017, EV GV and portal-hypertensive gastritis.   Lab works for cirrhosis survey showed low suspicion of hemochomatosis, nola and autoimmune.   No evidence of hepatic encephalopathy now.   No ascites found at last ultrasound.         Esophageal varices with bleeding (CMS-HCC)- (present on admission)   Assessment & Plan    Alcoholic liver disease related. EV and GV found by Dr. Avelar on 10/10/2017.   Cant have regular outpatient follow up due to insurance issue.   Echo from 10/2017, fatty liver, hepatomegaly, no ascites, hepatopetal portal flow.   Portal hypertension bleeding vs ulcer bleeding vs varices bleeding.   GI team consulted,  either urgent scope tonight or tmr morning.   Continue PPI, Octreotide, ceftriaxone.   RBC FFP PLT, 3 units for each         Diabetes mellitus (CMS-HCC)- (present on admission)   Assessment & Plan    Type 2, diagnosed in 10/2017, on metformin.   With neuropathy, which might be also related to his alcohol use.   Hold metformin, low dose sliding scale.   Hold his ACEI/ARB              Anticipated Hospital stay:  >2 midnights        Quality Measures  Quality-Core Measures   SCD  RIJ  Ceftriaxone  No vent

## 2018-03-10 NOTE — ED NOTES
R IJ triple lumen central line placed by Dr. Jaeger. Okay to use for emergent transfusion per Dr. Jaeger. 1 unit platelets, 1 unit plasma, and 1 unit RBCs initiated. All units verified with RN Dar Martin. Pt remains A&O x 3, tachycardic, and hypotensive. Airway intact. Ongoing close monitoring.

## 2018-03-10 NOTE — ASSESSMENT & PLAN NOTE
Hx of Alcoholic liver disease and possible fatty liver.   S/p esophageal variceal  Banding in 10/2017.  Per patient he stoppped drinking alcohol after diagnosis and procedure in October.  Presented to the ED with hx of hemtemesis, light headedness and Hgb of ~3  On octreotide , PPI, antibiotics, and EV ligation repeated on March 10  S/p EGD 03/10 : large esophageal varices s/p banding of esophageal varices x 5 and gastric varices.  Octreotide stopped on 03/13  Echo of heart grossly normal. TIPS on 03/14. Low dose b-blocker and PPI at discharge.

## 2018-03-10 NOTE — ASSESSMENT & PLAN NOTE
Significant drinking history ~ 8years.  Patient states he quit in October 2017 after a diagnosis of liver cirrhosis and esophageal variceal banding .   Ultrasound 10/2017 showed enlarged liver, fatty infiltration and/or cirrhotic change  EGD 10/2017 showed Esophageal Varices, Gastrica varices and portal-hypertensive gastropathy .  EGD 03/2018 EV GV, s/p EV ligation.   TIPS done on 03/14.   No evidence of hepatic encephalopathy, bleeding after overnight observation.   [facility] will [facility] or [facility] team after discharge. He need hepatitis vaccinations and also avoid uncooked/undercooked pork and fish/oyster/sushi

## 2018-03-10 NOTE — ED NOTES
Suctioning assisted patient. Pt remains alert and oriented, though appears lethargic and pale. Pt cleaned and new gown provided.

## 2018-03-10 NOTE — ED NOTES
tech from Lab called with critical result of H&H 3.5/11.8 at 1705. Critical lab result read back to tech   Dr. Chapman notified of critical lab result at 1706.  Critical lab result read back by Dr. Chapman

## 2018-03-10 NOTE — OR NURSING
AT THE END OF THE CASE, THE PATIENT WAS ROLLED TO A GUERNEY AND GIVEN A BEDBATH AND THEN TRANSFERRED BACK BY ROLLER TO A CLEAN BED.

## 2018-03-10 NOTE — PROGRESS NOTES
Audra from Lab called with critical result of Hgb 5.6 at 0715. Critical lab result read back to Audra.   Dr. Marcano notified of critical lab result at 0725.  Critical lab result read back by Dr. Marcano

## 2018-03-10 NOTE — ED TRIAGE NOTES
August Samuel  Chief Complaint   Patient presents with   • Syncope     biba from home,  after syncopal episode approx 40 mins ago.     • Blood in Vomit     x 1 episode today- hx of GI bleeds, esophageal varacies and chirrosis   • Bloody Stools     On monitor,  VSS.  Pt received 750cc IVF, 4mg IV zofran and 100mcg IV fent.    Pt pale in appearance.  No acute distress at this time.

## 2018-03-10 NOTE — PROGRESS NOTES
Visited patient at [facility] at 3am.   No evidence of fluid overload, clear breathing sound. No major adverse response after transfusion.   Will do another Unit, expect to see Hgb between 6-7.     The patient's blood pressure is around 90/45, he has no subjective complaints, he is alert and oriented and actually feeling better.   No more N/V or dark/bloody stool after admission.   His lactic acid also improving overnight.     -->keep obs

## 2018-03-10 NOTE — ED NOTES
Critical lab result received from chemistry :    Lactic acid 4.2    Results read back and verified.     Results delivered to [facility] MD Stahl in person.

## 2018-03-10 NOTE — CARE PLAN
Problem: Bowel/Gastric:  Goal: Normal bowel function is maintained or improved  Outcome: PROGRESSING SLOWER THAN EXPECTED      Problem: Safety  Goal: Free from accidental injury    Intervention: Initiate Safety Measures  Pt educated on safety precautions. Bed alarm on. Bed in lowest position. Call light within reach.       Problem: Hemodynamic Status  Goal: Vital Signs and Fluid Balance Management  Outcome: PROGRESSING SLOWER THAN EXPECTED    Intervention: Cardiac Monitoring, Pulse Oximetry  Pt connected to monitor at all times.   Intervention: Monitor I & O, Manage IV fluids and IV infusions  Pt on  mL/hr per MAR. UO recorded.   Intervention: Assess Color and Body Temperature  Temporal temp taken frequently. Pt pale, receiving red blood cell units.

## 2018-03-10 NOTE — PROGRESS NOTES
Cadence from Lab called with critical result of Hgb 4.6 and Hct 13.6 at 0022. Critical lab result read back to Cadence.   Paging [facility] rasheed

## 2018-03-10 NOTE — ED NOTES
ICU team at bedside. Requesting 3 units each of RBCs, platelets, and plasma. Transfusion of each ongoing. Pt maintaining VS and mentation. Ongoing monitoring.

## 2018-03-10 NOTE — ASSESSMENT & PLAN NOTE
Type 2, diagnosed in 10/2017, was on metformin.   With neuropathy, likely due to alcohol use.  Hgb A1C:   08/30/17: 8.4, 03/2018: 5.6 (not correct due to recent bleeding and transfusion)  Hypoglycemic protocol and very low dose Sliding if needed.   Resume his statin and metformin at discharge.

## 2018-03-10 NOTE — PROCEDURES
DATE OF SERVICE:  03/10/2018    PROCEDURE:  Upper endoscopy with variceal band ligation.    :  Vincenzo Lr MD    INDICATION:  The patient is a 27-year-old gentleman with history of   alcohol-related cirrhosis with portal hypertension, known esophageal and   gastric varices with prior bleeding that presented for large volume   hematemesis and severe acute blood loss anemia.    INFORMED CONSENT:  Written informed consent was obtained from the patient   after thorough explanation of indications, benefits and risks of the   procedure, which included but was not limited to bleeding, infection,   perforation, adverse reaction to medications and missed lesions.    MEDICATIONS GIVEN:  General anesthesia with endotracheal intubation.    Continuous telemetry, BP, pulse oximetry, and capnography were performed by   the anesthesiologist throughout the procedure.    Initially, 2T upper endoscope followed by mid size flexible upper endoscope   was used for the procedure.    FINDINGS:  Patient was placed in the left lateral decubitus position.  After   anesthesia was achieved, the endoscope was passed into the posterior pharynx   and advanced to second portion of the duodenum without difficulty.  The   patient tolerated procedure well with the following findings:  1.  Esophagus:  He did have 3 columns of large esophageal varices in the   distal esophagus.  There was noted to be evidence of a large nipple sign over   1 varix at approximately the 10 o'clock position in the distal esophagus.    This was not actively bleeding, but this was certainly consistent with   stigmata of very recent bleeding.  2.  Stomach:  Within the stomach, he had a large amount of fresh blood, which   was able to be suctioned completely.  He did have large cardia, varices, but   there was no stigmata of recent bleeding such as nipple sign or red maisha sign   over the gastric varices.  3.  Duodenum:  Normal.    After complete exam was  performed, the 2T endoscope was removed from patient.    A banding device was applied to the regular midsize upper endoscope.  The   midsize upper endoscope was reinserted to the level of the stomach.  Repeat   evaluation of the gastric varices again showed no evidence for bleeding.  The   scope was brought back into the esophagus.  I initially placed one band just   distal to the large nipple sign for decompression distally.  I then placed a   second band over the large nipple sign.  I did achieve good hemostasis without   bleeding.  Three other bands were applied to the other columns of varices.    The procedure was then completed with good hemostasis.    IMPRESSION:  1.  Large esophageal varices with large nipple sign indicative of recent   bleeding, suspect blood from this area.  Treated with variceal band ligation   x5.  2.  Large cardia, gastric varices, GOV2 type.  No stigmata of recent bleeding.    PLAN:  1.  Continue octreotide and Protonix.  2.  Continue antibiotics.  3.  Keep n.p.o.  4.  Monitor serial H and H's.  5.  Check thromboelastography with heparinase.  6.  We will proceed with CTA of his abdomen and pelvis and echocardiogram of   his heart in preparation for TIPS placement.  I suspect he would benefit from   tips placement even if he does not rebleed during this hospital stay to   prevent further issues given that his second major bleed within the past   several months.  This does not need to be done urgently given hemostasis with   endoscopy unless he were to develop rebleeding.       ____________________________________     MD KARIME SMITH / RAJIV    DD:  03/10/2018 13:17:44  DT:  03/10/2018 13:35:27    D#:  [id number]  Job#:  [id number]

## 2018-03-10 NOTE — PROGRESS NOTES
Pulmonary Critical Care Progress Note        Admit 3/9/2018    Chief Complaint: Hematemesis    History of Present Illness:   This is a 27-year-old male who is a    from the [place] deploy[place], presents with history of lightheadedness,   dizziness, hematemesis, and melena.  The patient states that he has known   history of esophageal varices as well as with early cirrhosis and has had been   drinking heavily for the last 10 years, but quit approximately 4 months ago.    The patient at that point had esophageal varices and was banded.  The patient   states that he has had ongoing bleeding.  The patient has had no significant   chest pain or shortness of breath associated with this.  He states that he was   near syncopal.  His heart rate has been elevated as well.  The patient's wife   did help him with getting him off the floor after he was very weak and   lightheaded.  Once ambulance arrived, IV was established and the patient was   transferred for higher level of care.  The patient has at this time no   routine.    Review of Systems   Constitutional: Negative for chills, diaphoresis and fever.   HENT: Negative for congestion, nosebleeds and sore throat.    Eyes: Negative.    Respiratory: Negative for cough, hemoptysis, sputum production and shortness of breath.    Cardiovascular: Negative for chest pain and palpitations.   Gastrointestinal: Positive for abdominal pain, blood in stool, melena and nausea. Negative for diarrhea, heartburn and vomiting.   Genitourinary: Negative.    Musculoskeletal: Negative.    Skin: Negative.    Neurological: Negative for sensory change, speech change, focal weakness and headaches.   Endo/Heme/Allergies: Negative.    Psychiatric/Behavioral: The patient has insomnia. The patient is not nervous/anxious.        Interval Events:  24 hour interval history reviewed     A&O x4  Melena/hematemesis - last 2 days - ok early AM  Hct 11.8 on admit -> 13.6 ->  S/p plt x3, FFP 3 and 7  units pRBCs as of 10:30  INR 1.44 this AM  SR/ST 80-110s - 80s now - lower when sleeping  Bp 70-100s  R IJ CVC  I/Os reviewed - incomplete  Tm 99.7  NPO  Room air  CXR no film today - last night - clear, no PTX, R IJ  Octreotide drip  Protonix drip  Ceftriaxone  Reviewed with family    Serial F/u  Bp remains soft - mentation ok - SR -> ST  UO ok  Recurrent hematemesis 11AM - 1000cc  N/V/ABD pain better after vomiting  Zofran  2 more units pRBCs ordered stat along with 3 FFP  TEG/mapping sent - > normal/mildly hypercoaguable?  D/w Team red early call for RED herb as needed  Reviewed with family    D/w GI re plans    Reviewed need for TIPs procedure with patient and staff  ECHO/CTA pending  Keep NPO  Vits to IV  IR aware      PFSH:  No change.    Physical Exam   Constitutional: He appears well-developed and well-nourished. He is cooperative. He appears ill. No distress.   HENT:   Head: Normocephalic and atraumatic.   Eyes: Pupils are equal, round, and reactive to light. Scleral icterus is present.   Neck: Phonation normal. No JVD present.   Cardiovascular: Normal rate, regular rhythm, normal heart sounds and intact distal pulses.  Exam reveals no gallop and no friction rub.    No murmur heard.  Pulmonary/Chest: Effort normal. No respiratory distress. He has no wheezes. He has no rales.   Abdominal: Soft. Bowel sounds are normal. He exhibits no mass. There is no hepatosplenomegaly. There is tenderness in the suprapubic area. There is no rebound, no guarding and negative Ferrell's sign.   Neurological: He is alert. No cranial nerve deficit. He exhibits normal muscle tone. Coordination normal.   Skin: Skin is warm and dry. Capillary refill takes less than 2 seconds. He is not diaphoretic. No erythema.   Psychiatric: He has a normal mood and affect. His behavior is normal. Thought content normal.       Respiratory:     Pulse Oximetry: 95 %        Room air    ImagingCXR  I have personally reviewed the chest x-ray my  impression is  noted above and films are reviewed with Team on rounds        Invalid input(s): [id number]     HemoDynamics:  Pulse: 95, Heart Rate (Monitored): 94  Blood Pressure: (!) 90/45, NIBP: (!) 92/58       Imaging: Available data reviewed  Recent Labs      03/09/18   1600   TROPONINI  <0.01       Neuro:  GCS Total Maria Victoria Coma Score: 15     Imaging: Available data reviewed    Fluids:  Intake/Output       03/08/18 0700 - 03/09/18 0659 (Not Admitted) 03/09/18 0700 - 03/10/18 0659 03/10/18 0700 - 03/11/18 0659      [id number] 8555-9884 Total [id number] 1900-0659 Total [id number] 1900-0659 Total       Intake    Blood  --  -- --  --  826.2 826.2  --  -- --    Volume (RELEASE PLATELET PHERESIS) -- -- -- -- 138.7 138.7 -- -- --    Volume (RELEASE PLATELET PHERESIS) -- -- -- -- 312.5 312.5 -- -- --    Volume (RELEASE RED BLOOD CELLS) -- -- -- -- 187.5 187.5 -- -- --    Volume (RELEASE RED BLOOD CELLS) -- -- -- -- 187.5 187.5 -- -- --    Total Intake -- -- -- -- 826.2 826.2 -- -- --       Output    Urine  --  -- --  --  825 825  --  -- --    Void (ml) -- -- -- -- 825 825 -- -- --    Total Output -- -- -- -- 825 825 -- -- --       Net I/O     -- -- -- -- 1.2 1.2 -- -- --        Weight: 90.9 kg (200 lb 6.4 oz)  Recent Labs      03/09/18   1600  03/09/18   2345  03/10/18   0457   SODIUM  137  139  140   POTASSIUM  3.6  4.1  4.1   CHLORIDE  109  110  112   CO2  19*  22  23   BUN  22  22  22   CREATININE  0.86  0.79  0.80   MAGNESIUM   --    --   1.6   PHOSPHORUS   --    --   4.1   CALCIUM  8.0*  7.9*  7.9*       GI/Nutrition:  Imaging: Available data reviewed   U/s ABD 10/11/2018:  1.  Enlarged liver with increased echogenicity which represent fatty infiltration and/or cirrhotic change. No mass or biliary dilatation.  2.  No cholelithiasis. Contracted gallbladder.  3.  Normal appearance of the pancreas.  4.  Normal hepatopedal flow in the portal vein. No portal vein enlargement.  5.  No ascites.  6.  Splenomegaly. Small  accessory spleen is present.    NPO     Liver Function  Recent Labs      18   1600  03/09/18   2345  03/10/18   0457   ALTSGPT  20   --   18   ASTSGOT  25   --   24   ALKPHOSPHAT  56   --   43   TBILIRUBIN  0.7   --   2.1*   LIPASE  21   --    --    GLUCOSE  187*  138*  122*       Heme:  Recent Labs      18   1600  03/09/18   2345  03/10/18   0457   RBC  1.52*  1.71*   --    HEMOGLOBIN  3.5*  4.6*   --    HEMATOCRIT  11.8*  13.6*   --    PLATELETCT  332  176   --    PROTHROMBTM  18.2*   --   17.2*   APTT  32.7   --   32.2   INR  1.54*   --   1.44*       Infectious Disease:  Temp  Av.8 °C (98.2 °F)  Min: 36.2 °C (97.2 °F)  Max: 37.6 °C (99.7 °F)  Micro: reviewed  Recent Labs      18   1600  03/09/18   2345  03/10/18   0457   WBC  22.8*  14.4*   --    NEUTSPOLYS  60.80  69.10   --    LYMPHOCYTES  31.50  25.10   --    MONOCYTES  5.50  4.90   --    EOSINOPHILS  1.00  0.10   --    BASOPHILS  0.10  0.10   --    ASTSGOT  25   --   24   ALTSGPT  20   --   18   ALKPHOSPHAT  56   --   43   TBILIRUBIN  0.7   --   2.1*     Current Facility-Administered Medications   Medication Dose Frequency Provider Last Rate Last Dose   • Magnesium Sulfate in D5W IVPB premix 1 g  1 g Once Lance Melo M.D.   1 g at 03/10/18 0553   • cefTRIAXone (ROCEPHIN) syringe 2 g  2 g Q24HRS Lance Melo M.D.   Stopped at 18 1900   • NS infusion   Continuous Lance Melo M.D. 100 mL/hr at 18     • insulin regular (HUMULIN R) injection 1-6 Units  1-6 Units TID Lance Melo M.D.   Stopped at 18 2100    And   • glucose 4 g chewable tablet 16 g  16 g Q15 MIN PRN Lance Melo M.D.        And   • dextrose 50% (D50W) injection 25 mL  25 mL Q15 MIN PRN Lance Melo M.D.       • pantoprazole (PROTONIX) injection 40 mg  40 mg BID Lance Melo M.D.       • octreotide (SANDOSTATIN) 1,250 mcg in  mL Infusion  50 mcg/hr Continuous Lance Melo M.D. 10 mL/hr at 18 50 mcg/hr at 18    • nicotine (NICODERM) 14 MG/24HR 14 mg  14 mg Daily-0600 Lance Melo M.D.        And   • nicotine polacrilex (NICORETTE) 2 MG piece 2 mg  2 mg Q HOUR PRN Lance Melo M.D.       • thiamine (THIAMINE) tablet 100 mg  100 mg DAILY Lance Melo M.D.         Last reviewed on 3/9/2018  9:13 PM by Carmen Pham R.N.    Quality  Measures:  Core Measures & Quality Metrics    Problems/plan:  Hemorrhagic shock -> Severe blood loss anemia/hypotension   NPO   Continue fluid and blood product resuscitation   Transfuse FFP/Plt as well as pRBCs   May need Calcium IV -> ordered   Maintain 2 large bore peripheral IVs - d/w RN   Central line for infusions/blood draws etc   Serial CBC and TEG/mapping  Massive upper gastrointestinal bleed, most likely from esophageal varices (eval for gastric as well) in the setting of portal hypertension and known history of esophageal varices in the past.   H/o prior GI bleed - variceal   Emergent EGD for today - variceal banding   TIPS later today by IR?   ECHO and CTA abdomen pre-TIPs eval    Octreotide infusion   Protonix infusion  Alcohol abuse    The patient currently reportedly sober.   H/o cirrhosis secondary to ETOH   Monitor for DTs - low CASS/CIWA score today   Thiamine/MVI/Folate  Microcytic anemia.   When acute process over assess iron stores and consider inferon therapy  Metabolic acidosis with predominant lactic acidosis.   Probably secondary to anemia   Transfuse as needed   Nothing to suggest sepsis   Empiric treatment for SBp prophylaxis with Ceftriaxone  Coagulopathy, likely related to acute on chronic liver disease.   TEG with platelet mapping today and in AM   Further blood products as needed   TOB use   Cessation encouraged   Nicotine patch  Diabetes Mellitus Type II   H/o neuropathy   Hold Metformin/ACE/ARB   SSI - glycemic control  NPO  Prophylaxis    Prognosis very guarded.  Critically ill with unstable GI/hemodynamics status with massive transfusion still ongoing.   EGD/TIPs for today and serial labs.  High risk of deterioration and worsening vital organ dysfunction and death without the above critical care interventions.  If significant hematemesis persists/worsens monitor for need for intubation to protect the airway.      Discussed patient condition and risk of morbidity and/or mortality with Family, RN, RT, Pharmacy, [employer] [facility] resident, Charge nurse / hot rounds, Patient, GI and Interventional Radiology.    The patient remains critically ill.  Critical care time = 85 minutes in directly providing and coordinating critical care and extensive data review.  No time overlap and excludes procedures.

## 2018-03-10 NOTE — ED NOTES
Assumed care of pt from EMS. Pt presents with syncope, BRBPR, and BRB in emesis x today. + hypotensive and pale. Denies pain or nausea at present. Changed to gown. Hooked to monitor - ST. 2 Lg bore PIVs placed by EMS. Labs drawn. Awaiting MD eval/orders

## 2018-03-10 NOTE — PROGRESS NOTES
Late entry:  Pt transported to unit on [facility] via transport monitor with RN and Tech. PRBC running, platelets running, brought 1 unit of PRBC (to make 3/3 RBC). Left Red box with BECKY Pryor. [facility] charge RN called ER charge to follow up about red box being left outside of [facility] to be returned to blood bank.

## 2018-03-10 NOTE — OR SURGEON
Immediate Post OP Note    PreOp Diagnosis: Hematemesis    PostOp Diagnosis: Esophageal varices with large nipple sign, large gastric cardia varices without stigmata of bleeding    Procedure(s):  GASTROSCOPY With ESOPHAGEAL BANDING - Wound Class: Clean Contaminated    Surgeon(s):  Vincenzo Lr M.D.    Anesthesiologist/Type of Anesthesia:  Anesthesiologist: Braydon Agrawal M.D.  Anesthesia Technician: Aruna Farnsworth/General    Surgical Staff:  Circulator: John Thomas R.N.; Luisana Cervantes R.N.  Endoscopy Technician: Gio Hickman    Specimens:  None    Estimated Blood Loss: None    Findings:   1. 2 columns of large esophageal varices in distal esophagus.  One varix had large nipple sign indicative of recent bleeding but no active bleeding.  2. Large cardia varices without stigmata of bleeding    Complications: None        3/10/2018 1:04 PM Vincenzo Lr

## 2018-03-10 NOTE — CONSULTS
DATE OF SERVICE:  03/09/2018    CHIEF COMPLAINT:  Near syncope, hematemesis, and blood in stool.    HISTORY OF PRESENT ILLNESS:  This is a 27-year-old male who is a  [place]   from the [place], presents with history of lightheadedness,   dizziness, hematemesis, and melena.  The patient states that he has known   history of esophageal varices as well as with early cirrhosis and has had been   drinking heavily for the last 10 years, but quit approximately 4 months ago.    The patient at that point had esophageal varices and was banded.  The patient   states that he has had ongoing bleeding.  The patient has had no significant   chest pain or shortness of breath associated with this.  He states that he was   near syncopal.  His heart rate has been elevated as well.  The patient's wife   did help him with getting him off the floor after he was very weak and   lightheaded.  Once ambulance arrived, IV was established and the patient was   transferred for higher level of care.  The patient has at this time no   routine.    MEDICATIONS:  In the past, was on omeprazole, Carafate, thiamin, propranolol,   Lipitor, Glucophage, and Prinivil.    PAST MEDICAL HISTORY:  Upper GI bleed with known esophageal varices,   portal gastropathy, diabetes mellitus, alcoholism, peripheral neuropathy.    PAST SURGICAL HISTORY: Upper GI endoscopy.  This included banding   performed on 10/10/2017 by Dr. Avelar.    SOCIAL HISTORY:  At this point, he is not currently drinking alcohol, stopped   in October.  No significant drug abuse and not a smoker.  Patient's Marital   status is single, but has a girlfriend at bedside.    FAMILY HISTORY:  The patient's family history is without significant hepatic   disease.    REVIEW OF SYSTEMS:  Review of systems are reviewed and otherwise negative   except described above in HPI.    PHYSICAL EXAMINATION:  GENERAL:  Patient was pale, tachycardic with blood pressure of 116/46, pulse   of 123,  respirations 16, saturation 100% on room air.  Patient is status post   1 unit of packed cells.  HEENT:  His conjunctivae are pale.  His nose shows no obvious epistaxis.  His   oropharynx does have a dried blood in his posterior pharynx.  NECK:  Shows no obvious JVD or adenopathy.  He has no nuchal rigidity.  CARDIOVASCULAR:  Heart was tachycardic with prominent S1 and S2.  He has a   right IJ central catheter in good position.  LUNGS:  Revealed breath sounds equal bilaterally.  There is no obvious   wheezing or rhonchi.  I do not hear obvious accessory sounds.  ABDOMEN:  Soft.  Bowel sounds are hyperactive.  No obvious pulsatile mass.  He   has no significant flank ecchymosis.  SKIN:  Reveals some cool upper extremities and lower extremities with pale   extremities.  He has no pitting edema in upper and lower extremity with no   evidence of clubbing or cyanosis.  NEUROLOGIC:  He is still mentating well.  He has no obvious sensory   dysfunction.  No obvious focal neurologic deficits otherwise.    LABORATORY DATA AND IMAGING:  The patient's workup includes a hematology   profile with white cell count of 22.8, H and H is 3.5 and 11.6 with MCV 77.6   with a platelet count of 332.  Prior to this, he did have on 10/12/2017,   hemoglobin of 11.8.  His chemistry showed a sodium of 137, potassium 3.6,   chloride 109, CO2 is 19 with glucose of 187, BUN of 22 with a creatinine of   0.86.  AST of 25, ALT of 20, ALP of 56, T bili of 0.7.  His lipase was   checked, it was 21 and lactate of 4.2 at 1825.  Troponin was less than 0.01.    Patient's coagulation studies showed an INR of 1.54, PT of 18.2, PTT of 32.7.    Urinalysis showed small amount of bilirubin, negative ketones back in   10/10/2017.  The patient's chest x-ray performed, I reviewed personally.  This   is my interpretation:  Patient has a right IJ central catheter in good   position.  There is no cardiomegaly.  There is no obvious gross infiltrates.    He has some  peribronchial cuffing and scattered granulomas in the mid hilar   region.    So far the emergency department in the [facility] the patient received 1 unit of packed   RBCs.  Current FFP and platelets are to be given.  The red box is being   utilized.  IV fluid of a liter has also been given.  The patient has been   under consultation with Dr. Mendoza who has spoken with personally and seen   the patient with as well.  Plan for endoscopy in the early a.m. in the OR.    IMPRESSION:  1.  Hemorrhagic shock.  2.  Massive upper gastrointestinal bleed, most likely from esophageal varices   in the setting of portal hypertension and known history of esophageal varices   in the past.  3.  Alcohol abuse in the past.  The patient currently reportedly sober at this point.  4.  Severe blood loss anemia.  5.  Microcytic anemia.  6.  Metabolic acidosis with predominant lactic acidosis.  7.  Coagulopathy, likely related to acute on chronic liver disease.   8.  Note that the patient's workup back in 10/10/2017 was negative for hepatitis.    Antimitochondrial antibody and smooth muscle antibody and F-actin antibody   all negative.    PLAN:  1.  The patient to receive the entire red box order at this point.  We would   follow CBCs closely at least q. 6 hours through the evening.  2.  Watch closely for developing hypotension and repeated hematemesis or   melena, in which case, patient to be intubated most likely and endoscopy to be   performed.  3.  Continue octreotide and Protonix infusions.  4.  We will plan to follow CVP as well.  5.  Follow lactic acidosis levels as it is elevated at 4.2, currently.  6.  Patient's case discussed with the patient and family in regards to   potential morbidity and mortality with needing intubation in the setting of   acute hemorrhage and shock and the possibility of need of protecting airway   for procedure with endoscopy.      The patient remains critically ill.      Critical care time so far is 45  minutes not including procedures document that I can   dictate.       ____________________________________     DO YADIRA Romero / RAJIV    DD:  03/09/2018 18:45:49  DT:  03/09/2018 20:38:06    D#:  [id number]  Job#:  [id number]

## 2018-03-10 NOTE — PROGRESS NOTES
Revisited the patient at 8pm in [facility].   3u rbc, 3u ffp done.   2u PLT done, and 1 unit PLT pending.     No evidence of active bleeding now, he is feeling better.

## 2018-03-10 NOTE — PROGRESS NOTES
GI Consults  Re: hematemesis, hx of eso varices, ESLD    Full note dictated.  Recs:  1.  Serial H&H with goal Hgb 7-8  2.  Ceftriaxone 2 gms IV q 24 hr  3.  Octreotide gtt  4.  Pantoprazole gtt  5.  NPO  6.  I will arrange for EGD with probable variceal banding in am with anesthesia--Dr. Lr  7.  If active bleeding this evening, he may need to be intubated and EGD emergently but hopefully he will have had at least 3 PRBC infused in the next few hours.  8. Dr. Lr will be assuming GI service in the am.

## 2018-03-10 NOTE — CONSULTS
DATE OF SERVICE:  03/09/2018    REFERRING PHYSICIAN:  Sandor Chapman MD.    REASON FOR THE CONSULT:  A 27-year-old male with alcoholic cirrhosis and   hematemesis.    HISTORY OF PRESENT ILLNESS:  The patient is a very pleasant 27-year-old male   who came home from [facility], approximately age 23.  He has been drinking   heavy for almost the past 4 years, although he did quit alcohol in October.    He was admitted in October to [facility] with hematemesis.  He underwent upper   endoscopy where he was found to have 4 columns of large esophageal varices   with stigmata of recent bleeding.  Seven esophageal bands were placed.  He was   also noted to have large fundic varices without stigmata.  During that   admission, he was found to have a negative ceruloplasmin, negative AMA,   negative HITESH, negative smooth muscle antibody, normal ferritin, normal alpha-1   antitrypsin, negative hepatitis B surface antigen and negative hepatitis C   antibody.  He used to drink 12 small cans of [place] Iced tea.  There is   no family history of liver disease.  He has no history of IV drug use.    He started a new job at [employer] and he has been working night shift.  To keep   himself awake, he has been drinking Red Bull and coffee.  Otherwise, he has   not had any new medications and has not taken any NSAIDs.  Yesterday, he was   dizzy, feeling unwell and called in sick.  Today, at approximately 2:00 p.m.,   he had a large hematemesis and again here in the emergency room.  He has been   having melena through the day.    PAST MEDICAL HISTORY:  ALLERGIES:  No known allergies.    MEDICATIONS:  Prior to admission, atorvastatin 20 mg every day, metformin 500   mg 2 tablets by mouth twice a day.    SURGERIES:  Right ankle for a brown recluse spider bite.    MEDICAL ILLNESSES:  Dyslipidemia, type 2 diabetes mellitus and history of   hypertension.    SOCIAL HISTORY:  He is , with the daughter.  He is a nonsmoker.  He   smoked marijuana  in the evening to help him to fall asleep.    FAMILY HISTORY:  Negative for digestive malignancies or liver disease.    REVIEW OF SYSTEMS:  GENERAL:  He has had weight loss over many months.  No fevers, chills, did   have nausea, vomiting.  HEENT:  No epistaxis.  PULMONARY:  No shortness of breath.  CARDIOVASCULAR:  No chest pain.  GASTROINTESTINAL:  As above.  GENITOURINARY:  No hematuria.  NEUROLOGIC:  Near syncope.  No seizure or stroke.  ENDOCRINE:  Diagnosed with diabetes 1 year ago.    PHYSICAL EXAMINATION:  VITAL SIGNS:  On presentation, temperature 36.7, heart rate 123, blood   pressure 116/46, respiration 16, lowest recorded blood pressure is 83/36 with   heart rate up to 125.  GENERAL:  This is a very pale 27-year-old male who was alert.  HEENT:  Pupils are round.  Sclerae appear anicteric.  No oral lesions.  NECK:  Soft.  No adenopathy.  LUNGS:  Clear to auscultation.  CARDIOVASCULAR:  Tachycardic, S1, S2, without murmur.  ABDOMEN:  Bowel sounds present, soft, nontender.  I did not appreciate   hepatosplenomegaly.  No ascites.  EXTREMITIES:  No clubbing, cyanosis or peripheral edema.  SKIN:  Smooth, moist, and warm.  NEUROLOGIC:  He is awake, alert and oriented.    LABORATORY DATA:  WBC 22.8, hemoglobin of 3.5, hematocrit 11.8, MCV 77.6,   platelet count 332.  Sodium 137, potassium 3.6, chloride 109,  bicarbonate 19,   BUN 22, creatinine 0.86, AST 25, ALT 20, alkaline phosphatase 56, total   bilirubin 0.7, albumin 2.8, lipase 21.  INR 1.54.    IMPRESSION:  1.  Hematemesis.  I suspect this is a variceal bleed.  The etiology for the   bleeding at this time may be caffeine and Red Bull beverages to keep himself   awake.  2.  Severe acute blood loss anemia.  3.  Hypotension.  4.  Coagulopathy.  5.  Pancytopenia.  6.  End-stage liver disease due to alcoholic cirrhosis.  He may have a   component of nonalcoholic steatohepatitis cirrhosis related to his previous   heavy weight, diabetes and hyperlipidemia.  He  has not had a liver biopsy.    RECOMMENDATIONS:  1.  He is being stabilized with blood transfusion.  I have spoken with Dr. Carias and we both agreed on at least 3 packed red blood cells this evening.  2.  Should the patient have active hematemesis again, he will need to be   intubated.  3.  I am unable to perform endoscopy due to his low circulating blood volume.    I anticipate tomorrow morning with anesthesia and I have notified my partner,   Dr. Lr.  4.  He is on octreotide drip and a pantoprazole drip.  5.  Serial H and H q. 6 hours.  6.  Consider TEG blood testing, if ongoing bleeding.  7.  Ceftriaxone 2 g IV q. 24 hours to prevent the risk of spontaneous   bacterial translocation.  I do not have ultrasound at this time and while he   did not have ascites in October, he could at this time being that he has   decompensated.  9.  He should be n.p.o. at least overnight and we can reassess at the time of   his endoscopy.       ____________________________________     MD SARY CORREA / RAJIV    DD:  03/09/2018 18:44:25  DT:  03/09/2018 22:52:48    D#:  [id number]  Job#:  [id number]

## 2018-03-10 NOTE — PROGRESS NOTES
[facility] ICU Progress Note      Admit Date: 3/9/2018  ICU Day: 2    Resident(s): Naima Marcano  Attending: MARY ANN OLMOS/ Dr. Albert    Date & Time:   3/10/2018   1:46 PM       Patient ID:    Name:             August Samuel   YOB: 1990  Age:                 27 y.o.  male   MRN:               [id number]    HPI:    28 yo male   with a hx of esophageal varices and liver cirrhosis 2/2 Alcohol presented to the ED with hematemesis, blood in stool and lightheadedness. Patient had banding of his esophageal varices in 10/2017. Patient states stopped drinking alcohol after procedure in October 2017.  In the ED, he was found to be tachycardic, /46, Hgb was 3.5. Massive transfusion protocol was initiated and he was given 1unit of FFP, platelets  and PRBC. A central line was placed. GI was consulted.  Patient has so far received 5 units of PRBC, 3 units of FFP, 3units of platelets.Patient continued to have bloody emesis with  Hgb  ~ 5 after continuous transfusion. He is scheduled for an urgent EGD this am.    Consultants:  GI : Dr Mendoza ,Dr Richards.  PMA: Dr Albert    Interval Events:    Admitted overnight for UGIB. Hx of alcoholic cirrhosis and esophageal banding in 10/2017.  Hematemesis still persists despite massive transfusion of blood products. 7units of PRBC, 3 units of FFP and 3 units of platelets  Hgb ~5.  Transfusing 1 unit of PRBC and FFP.  TEG ordered.  Emmergent EGD this am.    Review of Systems   Constitutional: Negative for chills and fever.   HENT: Negative for congestion and sore throat.    Respiratory: Negative for shortness of breath and wheezing.    Cardiovascular: Negative for chest pain, claudication and leg swelling.   Gastrointestinal: Positive for abdominal pain, blood in stool and vomiting.   Genitourinary: Negative for frequency, hematuria and urgency.   Musculoskeletal: Negative for joint pain and neck pain.   Neurological: Positive for weakness. Negative for  "seizures and headaches.   Psychiatric/Behavioral: The patient does not have insomnia.        PHYSICAL EXAM  Vitals:    03/10/18 1100 03/10/18 1130 03/10/18 1145 03/10/18 1220   BP: (!) 99/59 (!) 91/59 110/58 100/50   Pulse: 81 100 (!) 121 (!) 110   Resp: 20 (!) 28 18 (!) 33   Temp: 36.2 °C (97.2 °F) 36.2 °C (97.2 °F) 36.3 °C (97.3 °F) 36.8 °C (98.2 °F)   SpO2: 100% 100% 100% 98%   Weight:       Height:         Body mass index is 29.49 kg/m².  /50   Pulse (!) 110   Temp 36.8 °C (98.2 °F)   Resp (!) 33   Ht 1.803 m (5' 11\")   Wt 95.9 kg (211 lb 6.7 oz)   SpO2 98%   BMI 29.49 kg/m²   O2 therapy: Pulse Oximetry: 98 %, O2 (LPM): 0, O2 Delivery: None (Room Air)    Physical Exam   Constitutional: He is oriented to person, place, and time and well-developed, well-nourished, and in no distress.   HENT:   Head: Normocephalic and atraumatic.   Right Ear: External ear normal.   Left Ear: External ear normal.   Eyes: Pupils are equal, round, and reactive to light. Scleral icterus is present.   pale   Neck: Normal range of motion. No JVD present.   Cardiovascular: Regular rhythm, normal heart sounds and intact distal pulses.  Exam reveals no gallop and no friction rub.    No murmur heard.  tachycardic   Pulmonary/Chest: Effort normal and breath sounds normal. No respiratory distress. He has no wheezes. He has no rales.   Abdominal: Soft. Bowel sounds are normal. He exhibits no distension. There is no tenderness. There is no rebound.   Musculoskeletal: He exhibits no edema or tenderness.   Neurological: He is alert and oriented to person, place, and time. No cranial nerve deficit.       Respiratory:     Respiration: (!) 33, Pulse Oximetry: 98 %    Chest Tube Drains:    Recent Labs      03/10/18   1137   ISTATTEMP  36.2 C   ISTATFIO2  21   ISTATSPEC  Venous       HemoDynamics:  Pulse: (!) 110, Heart Rate (Monitored): 80 Blood Pressure: 100/50, NIBP: (!) 94/62      Neuro:      Fluids:    Date 03/10/18 0700 - 03/11/18 " 0659   Shift 5123-1465 3788-1930 5538-5641 24 Hour Total   I  N  T  A  K  E   P.O. 0   0      P.O. 0   0    I.V. 1000   1000      Crystalloid Intake 1000   1000    Blood 1503.8   1503.8      PRBC Total Volume (Non-Barcoded) 700   700      Volume (RELEASE RED BLOOD CELLS) 378.8   378.8      Volume (RELEASE RED BLOOD CELLS) 425   425    Shift Total 2503.8   2503.8   O  U  T  P  U  T   Urine 0   0      Void (ml) 0   0    Emesis 1000   1000      Emesis 1000   1000    Blood 1000   1000      Est. Blood Loss (mL) 1000   1000    Shift Total 2000   .8   503.8        Intake/Output Summary (Last 24 hours) at 03/10/18 1247  Last data filed at 03/10/18 1220   Gross per 24 hour   Intake          1629.92 ml   Output             1825 ml   Net          -195.08 ml       Weight: 95.9 kg (211 lb 6.7 oz)  Body mass index is 29.49 kg/m².    Recent Labs      18   1600  03/09/18   2345  03/10/18   0457   SODIUM  137  139  140   POTASSIUM  3.6  4.1  4.1   CHLORIDE  109  110  112   CO2  19*  22  23   BUN  22  22  22   CREATININE  0.86  0.79  0.80   MAGNESIUM   --    --   1.6   PHOSPHORUS   --    --   4.1   CALCIUM  8.0*  7.9*  7.9*       GI/Nutrition:  Recent Labs      03/09/18   1600  03/09/18   2345  03/10/18   0457   ALTSGPT  20   --   18   ASTSGOT  25   --   24   ALKPHOSPHAT  56   --   43   TBILIRUBIN  0.7   --   2.1*   LIPASE  21   --    --    GLUCOSE  187*  138*  122*       Heme:  Recent Labs      18   1600  03/09/18   2345  03/10/18   0457   RBC  1.52*  1.71*  2.12*   HEMOGLOBIN  3.5*  4.6*  5.6*   HEMATOCRIT  11.8*  13.6*  17.1*   PLATELETCT  332  176  164   PROTHROMBTM  18.2*   --   17.2*   APTT  32.7   --   32.2   INR  1.54*   --   1.44*       Infectious Disease:  Temp  Av.6 °C (97.9 °F)  Min: 36.2 °C (97.2 °F)  Max: 37.6 °C (99.7 °F)  Recent Labs      18   1600  18   2345  03/10/18   0457   WBC  22.8*  14.4*  12.7*   NEUTSPOLYS  60.80  69.10  57.70   LYMPHOCYTES  31.50  25.10  35.60    MONOCYTES  5.50  4.90  5.10   EOSINOPHILS  1.00  0.10  0.60   BASOPHILS  0.10  0.10  0.40   ASTSGOT  25   --   24   ALTSGPT  20   --   18   ALKPHOSPHAT  56   --   43   TBILIRUBIN  0.7   --   2.1*       Meds:  • NS       • ondansetron       • ondansetron       • ondansetron       • ondansetron  4 mg     • NS       • fentaNYL  25 mcg     • cefTRIAXone (ROCEPHIN) IV  2 g     • NS   100 mL/hr at 03/10/18 0758   • insulin regular  1-6 Units      And   • glucose 4 g  16 g      And   • dextrose 50%  25 mL     • pantoprazole  40 mg     • Octreotide infusion  50 mcg/hr 50 mcg/hr (03/09/18 2015)   • nicotine  14 mg      And   • nicotine polacrilex  2 mg     • thiamine  100 mg          Procedures:  None     Imaging:  DX-CHEST-PORTABLE (1 VIEW)   Final Result      1.  No acute cardiopulmonary disease.   2.  Supportive tubing as described above.      ECHOCARDIOGRAM COMP W/O CONT    (Results Pending)   CT-ABDOMEN-PELVIS WITH    (Results Pending)       Problem and Plan:      * Esophageal varices with bleeding (CMS-HCC)- (present on admission)   Assessment & Plan       Hx of Alcoholic liver disease .   S/p esophageal variceal  Banding in 10/2017.  Per patient he stoppped drinking alcohol after diagnosis and procedure in October.  Did not have regular follow up due to insurance issues  Recent episode of GI bleed, ? Precipitating factor.  Presented to the ED with hx of hemtemesis, light headedness and Hgb of ~3  Transfused with 1unit of PRBC, FFP and platelets.  Started on octreotide , PPI and GI consulted   Admitted to the ICU, still having hematemesis with Hgb ~5, after 5units of PRBC,3units of FFP and 3 units of platelets.  1unit of FFP and PRBC orederd for transfusion     Plan  Emmergent EGD by GI.  Continue PPI, octreotide drip, ceftriaxone   H&H q6.  Transfuse if Hgb <7   TEG studies              Alcoholic cirrhosis (CMS-HCC)- (present on admission)   Assessment & Plan    Significant drinking history ~ 8years.  Patient states  he quit in October 2017 after a diagnosis of liver cirrhosis and esophageal variceal banding .   Ultrasound 10/2017 showed enlarged liver, fatty infiltration and/or cirrhotic change  EGD done  10/2017 showed Esophageal Varices, Gastrica varices and portal-hypertensive gastropathy .   No evidence of hepatic encephalopathy .              Peripheral neuropathy (CMS-HCC)   Assessment & Plan    Likely due to hx of alcohol , DM  Last HgbA1c 8.4.  On metformin at home.     Plan  Holding metformin. ISS . Tight glycemic control.        Diabetes mellitus (CMS-HCC)- (present on admission)   Assessment & Plan    Type 2, diagnosed in 10/2017, on metformin.   With neuropathy, likely due to alcohol use.  Hgb 08/30/17: 8.4    Plan  Hold ISS.  Hypoglycemic protocol.              DISPO: ICU    CODE STATUS: full    Quality Measures:  Larson Catheter: None   DVT Prophylaxis: None  Ulcer Prophylaxis: On PPI drip for GI bleed   Antibiotics: C3 for SBP prophylaxis  Lines: RIJ, PIV

## 2018-03-10 NOTE — PROGRESS NOTES
Report received from Carmen. Lines and drips verified at beside. Call light with reach, fall precautions in place. Pt verbalizes understanding to utilize call light before getting up. Pt not c/o N/V or pain. Will continue to monitor.

## 2018-03-11 LAB
ALBUMIN SERPL BCP-MCNC: 2.6 G/DL (ref 3.2–4.9)
ALBUMIN/GLOB SERPL: 1.2 G/DL
ALP SERPL-CCNC: 40 U/L (ref 30–99)
ALT SERPL-CCNC: 21 U/L (ref 2–50)
ANION GAP SERPL CALC-SCNC: 5 MMOL/L (ref 0–11.9)
AST SERPL-CCNC: 29 U/L (ref 12–45)
BASOPHILS # BLD AUTO: 0.6 % (ref 0–1.8)
BASOPHILS # BLD AUTO: 0.7 % (ref 0–1.8)
BASOPHILS # BLD AUTO: 0.7 % (ref 0–1.8)
BASOPHILS # BLD: 0.05 K/UL (ref 0–0.12)
BASOPHILS # BLD: 0.06 K/UL (ref 0–0.12)
BASOPHILS # BLD: 0.06 K/UL (ref 0–0.12)
BILIRUB SERPL-MCNC: 1.6 MG/DL (ref 0.1–1.5)
BUN SERPL-MCNC: 11 MG/DL (ref 8–22)
CALCIUM SERPL-MCNC: 7.4 MG/DL (ref 8.5–10.5)
CFT BLD TEG: 6.4 MIN (ref 5–10)
CHLORIDE SERPL-SCNC: 110 MMOL/L (ref 96–112)
CLOT ANGLE BLD TEG: 58.7 DEGREES (ref 53–72)
CLOT LYSIS 30M P MA LENFR BLD TEG: 1.2 % (ref 0–8)
CO2 SERPL-SCNC: 21 MMOL/L (ref 20–33)
CREAT SERPL-MCNC: 0.76 MG/DL (ref 0.5–1.4)
CT.EXTRINSIC BLD ROTEM: 2.4 MIN (ref 1–3)
EKG IMPRESSION: NORMAL
EOSINOPHIL # BLD AUTO: 0.2 K/UL (ref 0–0.51)
EOSINOPHIL # BLD AUTO: 0.23 K/UL (ref 0–0.51)
EOSINOPHIL # BLD AUTO: 0.25 K/UL (ref 0–0.51)
EOSINOPHIL NFR BLD: 2.3 % (ref 0–6.9)
EOSINOPHIL NFR BLD: 2.8 % (ref 0–6.9)
EOSINOPHIL NFR BLD: 2.9 % (ref 0–6.9)
ERYTHROCYTE [DISTWIDTH] IN BLOOD BY AUTOMATED COUNT: 49.5 FL (ref 35.9–50)
ERYTHROCYTE [DISTWIDTH] IN BLOOD BY AUTOMATED COUNT: 49.8 FL (ref 35.9–50)
ERYTHROCYTE [DISTWIDTH] IN BLOOD BY AUTOMATED COUNT: 49.9 FL (ref 35.9–50)
GLOBULIN SER CALC-MCNC: 2.2 G/DL (ref 1.9–3.5)
GLUCOSE BLD-MCNC: 108 MG/DL (ref 65–99)
GLUCOSE BLD-MCNC: 125 MG/DL (ref 65–99)
GLUCOSE BLD-MCNC: 135 MG/DL (ref 65–99)
GLUCOSE SERPL-MCNC: 168 MG/DL (ref 65–99)
HCT VFR BLD AUTO: 20.3 % (ref 42–52)
HCT VFR BLD AUTO: 22.6 % (ref 42–52)
HCT VFR BLD AUTO: 23.8 % (ref 42–52)
HGB BLD-MCNC: 7 G/DL (ref 14–18)
HGB BLD-MCNC: 7.6 G/DL (ref 14–18)
HGB BLD-MCNC: 8 G/DL (ref 14–18)
IMM GRANULOCYTES # BLD AUTO: 0.03 K/UL (ref 0–0.11)
IMM GRANULOCYTES # BLD AUTO: 0.06 K/UL (ref 0–0.11)
IMM GRANULOCYTES # BLD AUTO: 0.06 K/UL (ref 0–0.11)
IMM GRANULOCYTES NFR BLD AUTO: 0.4 % (ref 0–0.9)
IMM GRANULOCYTES NFR BLD AUTO: 0.7 % (ref 0–0.9)
IMM GRANULOCYTES NFR BLD AUTO: 0.7 % (ref 0–0.9)
INR PPP: 1.38 (ref 0.87–1.13)
LV EJECT FRACT  99904: 65
LV EJECT FRACT MOD 2C 99903: 42.63
LV EJECT FRACT MOD 4C 99902: 63.26
LV EJECT FRACT MOD BP 99901: 54.99
LYMPHOCYTES # BLD AUTO: 2.67 K/UL (ref 1–4.8)
LYMPHOCYTES # BLD AUTO: 2.88 K/UL (ref 1–4.8)
LYMPHOCYTES # BLD AUTO: 3.24 K/UL (ref 1–4.8)
LYMPHOCYTES NFR BLD: 31.1 % (ref 22–41)
LYMPHOCYTES NFR BLD: 35.9 % (ref 22–41)
LYMPHOCYTES NFR BLD: 36.6 % (ref 22–41)
MAGNESIUM SERPL-MCNC: 1.9 MG/DL (ref 1.5–2.5)
MCF BLD TEG: 56.4 MM (ref 50–70)
MCH RBC QN AUTO: 28.8 PG (ref 27–33)
MCH RBC QN AUTO: 29 PG (ref 27–33)
MCH RBC QN AUTO: 29.8 PG (ref 27–33)
MCHC RBC AUTO-ENTMCNC: 33.6 G/DL (ref 33.7–35.3)
MCHC RBC AUTO-ENTMCNC: 33.6 G/DL (ref 33.7–35.3)
MCHC RBC AUTO-ENTMCNC: 34.5 G/DL (ref 33.7–35.3)
MCV RBC AUTO: 85.6 FL (ref 81.4–97.8)
MCV RBC AUTO: 86.3 FL (ref 81.4–97.8)
MCV RBC AUTO: 86.4 FL (ref 81.4–97.8)
MONOCYTES # BLD AUTO: 0.43 K/UL (ref 0–0.85)
MONOCYTES # BLD AUTO: 0.45 K/UL (ref 0–0.85)
MONOCYTES # BLD AUTO: 0.46 K/UL (ref 0–0.85)
MONOCYTES NFR BLD AUTO: 5 % (ref 0–13.4)
MONOCYTES NFR BLD AUTO: 5.2 % (ref 0–13.4)
MONOCYTES NFR BLD AUTO: 5.6 % (ref 0–13.4)
NEUTROPHILS # BLD AUTO: 4.37 K/UL (ref 1.82–7.42)
NEUTROPHILS # BLD AUTO: 4.79 K/UL (ref 1.82–7.42)
NEUTROPHILS # BLD AUTO: 5.16 K/UL (ref 1.82–7.42)
NEUTROPHILS NFR BLD: 54.1 % (ref 44–72)
NEUTROPHILS NFR BLD: 54.5 % (ref 44–72)
NEUTROPHILS NFR BLD: 60.2 % (ref 44–72)
NRBC # BLD AUTO: 0 K/UL
NRBC # BLD AUTO: 0 K/UL
NRBC # BLD AUTO: 0.02 K/UL
NRBC BLD-RTO: 0 /100 WBC
NRBC BLD-RTO: 0 /100 WBC
NRBC BLD-RTO: 0.2 /100 WBC
PA AA BLD-ACNC: 0 %
PA ADP BLD-ACNC: 0.8 %
PLATELET # BLD AUTO: 101 K/UL (ref 164–446)
PLATELET # BLD AUTO: 107 K/UL (ref 164–446)
PLATELET # BLD AUTO: 113 K/UL (ref 164–446)
PMV BLD AUTO: 11.1 FL (ref 9–12.9)
PMV BLD AUTO: 11.3 FL (ref 9–12.9)
PMV BLD AUTO: 12.2 FL (ref 9–12.9)
POTASSIUM SERPL-SCNC: 3.7 MMOL/L (ref 3.6–5.5)
PROT SERPL-MCNC: 4.8 G/DL (ref 6–8.2)
PROTHROMBIN TIME: 16.7 SEC (ref 12–14.6)
RBC # BLD AUTO: 2.35 M/UL (ref 4.7–6.1)
RBC # BLD AUTO: 2.62 M/UL (ref 4.7–6.1)
RBC # BLD AUTO: 2.78 M/UL (ref 4.7–6.1)
SODIUM SERPL-SCNC: 136 MMOL/L (ref 135–145)
TEG ALGORITHM TGALG: NORMAL
WBC # BLD AUTO: 8 K/UL (ref 4.8–10.8)
WBC # BLD AUTO: 8.6 K/UL (ref 4.8–10.8)
WBC # BLD AUTO: 8.9 K/UL (ref 4.8–10.8)

## 2018-03-11 PROCEDURE — 36430 TRANSFUSION BLD/BLD COMPNT: CPT

## 2018-03-11 PROCEDURE — 85347 COAGULATION TIME ACTIVATED: CPT

## 2018-03-11 PROCEDURE — 83735 ASSAY OF MAGNESIUM: CPT

## 2018-03-11 PROCEDURE — 82962 GLUCOSE BLOOD TEST: CPT

## 2018-03-11 PROCEDURE — P9016 RBC LEUKOCYTES REDUCED: HCPCS

## 2018-03-11 PROCEDURE — A9270 NON-COVERED ITEM OR SERVICE: HCPCS | Performed by: INTERNAL MEDICINE

## 2018-03-11 PROCEDURE — 85384 FIBRINOGEN ACTIVITY: CPT

## 2018-03-11 PROCEDURE — 700102 HCHG RX REV CODE 250 W/ 637 OVERRIDE(OP): Performed by: INTERNAL MEDICINE

## 2018-03-11 PROCEDURE — 85576 BLOOD PLATELET AGGREGATION: CPT

## 2018-03-11 PROCEDURE — 86923 COMPATIBILITY TEST ELECTRIC: CPT

## 2018-03-11 PROCEDURE — 700111 HCHG RX REV CODE 636 W/ 250 OVERRIDE (IP): Performed by: INTERNAL MEDICINE

## 2018-03-11 PROCEDURE — 85025 COMPLETE CBC W/AUTO DIFF WBC: CPT | Mod: 91

## 2018-03-11 PROCEDURE — 93306 TTE W/DOPPLER COMPLETE: CPT | Mod: 26 | Performed by: INTERNAL MEDICINE

## 2018-03-11 PROCEDURE — 700105 HCHG RX REV CODE 258

## 2018-03-11 PROCEDURE — C9113 INJ PANTOPRAZOLE SODIUM, VIA: HCPCS | Performed by: INTERNAL MEDICINE

## 2018-03-11 PROCEDURE — 770022 HCHG ROOM/CARE - ICU (200)

## 2018-03-11 PROCEDURE — 80053 COMPREHEN METABOLIC PANEL: CPT

## 2018-03-11 PROCEDURE — 93306 TTE W/DOPPLER COMPLETE: CPT

## 2018-03-11 PROCEDURE — 700105 HCHG RX REV CODE 258: Performed by: INTERNAL MEDICINE

## 2018-03-11 PROCEDURE — 85610 PROTHROMBIN TIME: CPT

## 2018-03-11 RX ORDER — SODIUM CHLORIDE 9 MG/ML
INJECTION, SOLUTION INTRAVENOUS
Status: COMPLETED
Start: 2018-03-11 | End: 2018-03-11

## 2018-03-11 RX ADMIN — PANTOPRAZOLE SODIUM 40 MG: 40 INJECTION, POWDER, LYOPHILIZED, FOR SOLUTION INTRAVENOUS at 08:34

## 2018-03-11 RX ADMIN — SODIUM CHLORIDE: 9 INJECTION, SOLUTION INTRAVENOUS at 17:26

## 2018-03-11 RX ADMIN — SODIUM CHLORIDE: 9 INJECTION, SOLUTION INTRAVENOUS at 10:30

## 2018-03-11 RX ADMIN — NICOTINE 14 MG: 14 PATCH, EXTENDED RELEASE TRANSDERMAL at 06:00

## 2018-03-11 RX ADMIN — PANTOPRAZOLE SODIUM 40 MG: 40 INJECTION, POWDER, LYOPHILIZED, FOR SOLUTION INTRAVENOUS at 20:02

## 2018-03-11 RX ADMIN — INSULIN HUMAN 1 UNITS: 100 INJECTION, SOLUTION PARENTERAL at 20:05

## 2018-03-11 RX ADMIN — FOLIC ACID 1 MG: 1 TABLET ORAL at 15:55

## 2018-03-11 RX ADMIN — CEFTRIAXONE 2 G: 2 INJECTION, POWDER, FOR SOLUTION INTRAMUSCULAR; INTRAVENOUS at 20:02

## 2018-03-11 RX ADMIN — Medication 100 MG: at 08:34

## 2018-03-11 RX ADMIN — THERA TABS 1 TABLET: TAB at 15:55

## 2018-03-11 RX ADMIN — SODIUM CHLORIDE: 9 INJECTION, SOLUTION INTRAVENOUS at 08:44

## 2018-03-11 ASSESSMENT — PAIN SCALES - GENERAL
PAINLEVEL_OUTOF10: 0

## 2018-03-11 ASSESSMENT — PATIENT HEALTH QUESTIONNAIRE - PHQ9
SUM OF ALL RESPONSES TO PHQ9 QUESTIONS 1 AND 2: 0
2. FEELING DOWN, DEPRESSED, IRRITABLE, OR HOPELESS: NOT AT ALL
SUM OF ALL RESPONSES TO PHQ QUESTIONS 1-9: 0
1. LITTLE INTEREST OR PLEASURE IN DOING THINGS: NOT AT ALL

## 2018-03-11 ASSESSMENT — ENCOUNTER SYMPTOMS
VOMITING: 0
NECK PAIN: 0
CONSTIPATION: 0
SPUTUM PRODUCTION: 0
COUGH: 0
HEARTBURN: 0
PALPITATIONS: 0
DIAPHORESIS: 0
NAUSEA: 1
EYES NEGATIVE: 1
FOCAL WEAKNESS: 0
HEADACHES: 0
SENSORY CHANGE: 0
WEAKNESS: 1
ABDOMINAL PAIN: 1
INSOMNIA: 0
FEVER: 0
WHEEZING: 0
SORE THROAT: 0
CHILLS: 0
SEIZURES: 0
BLOOD IN STOOL: 1
SPEECH CHANGE: 0
SHORTNESS OF BREATH: 0
MUSCULOSKELETAL NEGATIVE: 1
INSOMNIA: 1
NERVOUS/ANXIOUS: 0
DIARRHEA: 0
HEMOPTYSIS: 0
ABDOMINAL PAIN: 0
CLAUDICATION: 0
NAUSEA: 0

## 2018-03-11 ASSESSMENT — LIFESTYLE VARIABLES: DO YOU DRINK ALCOHOL: NO

## 2018-03-11 NOTE — PROGRESS NOTES
1630 Pt left unit to go to CT with this RN and transport. Verified allergies. Consent for contrast signed by patient.     1655 Pt returned to floor without any incident. VSS during CT scan. Pt did not c/o of any N/V or pain.

## 2018-03-11 NOTE — PROGRESS NOTES
1410 Pt arrived on unit. Report received from Eliecer. NK monitor not working. Notified monitor room. Pt placed on tele box and monitor room was able to monitor pt's HR. Pt declined dizziness or N/V. This RN stayed with pt until NK was working.     1445 NK working and VSS checked q 15 minutes.

## 2018-03-11 NOTE — CARE PLAN
Problem: Safety  Goal: Will remain free from injury  Outcome: PROGRESSING AS EXPECTED  Fall precautions in place, call light within reach, and hourly rounding provided. Pt verbalizes understanding of fall precautions    Problem: Infection  Goal: Will remain free from infection  Outcome: PROGRESSING AS EXPECTED  Pt afebrile and hand hygiene provided. Pt not exhibiting s/s of infection      Problem: Oxygenation/Respiratory Function  Goal: Patient will Achieve/Maintain Optimum Respiratory Rate/Effort  Outcome: PROGRESSING AS EXPECTED  Pt on RA after EDG procedure with O2 sat in the high 90's.     Problem: Hemodynamic Status  Goal: Vital Signs and Fluid Balance Management  Outcome: PROGRESSING AS EXPECTED  Pt received a total of 3 PRBC and 3 FFP and 2 PRBC in OR for active GI bleed, pt had EDG done with 3 banding. BP monitored q15 minutes during active bleed with SBP in 's.

## 2018-03-11 NOTE — CARE PLAN
Problem: Communication  Goal: The ability to communicate needs accurately and effectively will improve  Outcome: PROGRESSING AS EXPECTED  Call bell in reach, pt. Wife at bedside, pt. Alert and oriented and rings appropriately for RN     Problem: Bowel/Gastric:  Goal: Normal bowel function is maintained or improved  Outcome: PROGRESSING SLOWER THAN EXPECTED  NPO at this time, monitor emesis and stool for bleeding.

## 2018-03-11 NOTE — PROGRESS NOTES
Gastroenterology Progress Note     Author: Vincenzo Lr   Date & Time Created: 3/11/2018 11:15 AM    Interval History:  3/10/2018: Denies further hematemesis or melena overnight.  Mild epigastric dyspepsia.  Has received 5U PRBC, 3U FFP and platelets overnight.  Current Hgb 5.  3/11/2018: EGD yesterday with large EV with stigmata of bleeding s/p band x5 and gastric varices.  Hgb stable since banding.  Denies other issues.  Still some old blood in stools.    Chief Complaint:  hematemesis    Review of Systems:  Review of Systems   Constitutional: Negative for chills and fever.   Respiratory: Negative for shortness of breath.    Cardiovascular: Negative for chest pain.   Gastrointestinal: Positive for blood in stool and melena. Negative for abdominal pain, constipation, diarrhea, heartburn, nausea and vomiting.       Physical Exam:  Physical Exam   Constitutional: He is oriented to person, place, and time. He appears well-developed and well-nourished.   Cardiovascular: Normal rate and regular rhythm.    Pulmonary/Chest: Effort normal and breath sounds normal.   Abdominal: Soft. Bowel sounds are normal. He exhibits no distension. There is no tenderness.   Musculoskeletal: He exhibits no edema.   Neurological: He is alert and oriented to person, place, and time.   Nursing note and vitals reviewed.      Labs:  Recent Labs      03/10/18   1137   ISTATTEMP  36.2 C   ISTATFIO2  21   ISTATSPEC  Venous     Recent Labs      03/09/18   1600   TROPONINI  <0.01     Recent Labs      03/09/18   1600  03/09/18   2345  03/10/18   0457  03/11/18   0418   SODIUM  137  139  140   --    POTASSIUM  3.6  4.1  4.1   --    CHLORIDE  109  110  112   --    CO2  19*  22  23   --    BUN  22  22  22   --    CREATININE  0.86  0.79  0.80   --    MAGNESIUM   --    --   1.6  1.9   PHOSPHORUS   --    --   4.1   --    CALCIUM  8.0*  7.9*  7.9*   --      Recent Labs      03/09/18   1600  03/09/18   2345  03/10/18   0457   ALTSGPT  20   --   18    ASTSGOT  25   --   24   ALKPHOSPHAT  56   --   43   TBILIRUBIN  0.7   --   2.1*   LIPASE  21   --    --    GLUCOSE  187*  138*  122*     Recent Labs      03/09/18   1600   03/10/18   0457  03/10/18   1600  03/10/18   2245  03/11/18   0418   RBC  1.52*   < >  2.12*  2.37*  2.40*  2.35*   HEMOGLOBIN  3.5*   < >  5.6*  6.9*  7.0*  7.0*   HEMATOCRIT  11.8*   < >  17.1*  20.2*  20.3*  20.3*   PLATELETCT  332   < >  164  137*  113*  107*   PROTHROMBTM  18.2*   --   17.2*   --    --    --    APTT  32.7   --   32.2   --    --    --    INR  1.54*   --   1.44*   --    --    --     < > = values in this interval not displayed.     Recent Labs      03/09/18   1600   03/10/18   0457  03/10/18   1600  03/10/18   2245  03/11/18   0418   WBC  22.8*   < >  12.7*  12.2*  10.7  8.6   NEUTSPOLYS  60.80   < >  57.70  65.20  55.80  60.20   LYMPHOCYTES  31.50   < >  35.60  28.70  36.10  31.10   MONOCYTES  5.50   < >  5.10  4.40  5.20  5.00   EOSINOPHILS  1.00   < >  0.60  0.40  1.80  2.30   BASOPHILS  0.10   < >  0.40  0.60  0.70  0.70   ASTSGOT  25   --   24   --    --    --    ALTSGPT     18   --    --    --    ALKPHOSPHAT  56   --   43   --    --    --    TBILIRUBIN  0.7   --   2.1*   --    --    --     < > = values in this interval not displayed.     Hemodynamics:  Temp (24hrs), Av.3 °C (97.4 °F), Min:35.8 °C (96.5 °F), Max:36.8 °C (98.2 °F)  Temperature: 36.7 °C (98.1 °F)  Pulse  Av.6  Min: 69  Max: 134Heart Rate (Monitored): 72  Blood Pressure: (!) 99/65, NIBP: (!) 99/65     Respiratory:    Respiration: 17, Pulse Oximetry: 97 %        RUL Breath Sounds: Clear, RML Breath Sounds: Clear, RLL Breath Sounds: Clear, OTTO Breath Sounds: Clear, LLL Breath Sounds: Clear  Fluids:    Intake/Output Summary (Last 24 hours) at 03/10/18 0843  Last data filed at 03/10/18 0400   Gross per 24 hour   Intake           826.17 ml   Output              825 ml   Net             1.17 ml        GI/Nutrition:  Orders Placed This Encounter    Procedures   • Diet NPO     Standing Status:   Standing     Number of Occurrences:   1     Order Specific Question:   Restrict to:     Answer:   Strict [1]     Medical Decision Making, by Problem:  Active Hospital Problems    Diagnosis   • Alcoholic cirrhosis (CMS-HCC) [K70.30]   • Esophageal varices with bleeding (CMS-HCC) [I85.01]   • Diabetes mellitus (CMS-HCC) [E11.9]     PROBLEMS:  1. Hematemesis.  Secondary to esophageal variceal bleed but also with gastric varices as well  2. Acute blood loss anemia, severe.  Required 10 units PRBCs.  Now stable post-banding  3. Cirrhosis, secondary to alcohol versus [place].  No prior liver biopsy.  Stopped alcohol 10/2017  4. Pancytopenia secondary to cirrhosis  5. Coagulopathy secondary to cirrhosis  6. Diabetes mellitus  7. Hyperlipidemia  8. 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 Continue octreotide, Protonix and ceftriaxone  2. Await echocardiogram.  If echo without severe pulmonary HTN or cardiomyopathy, then recommend TIPS by IR given 2 life threatening variceal bleeds  3. Advance diet as tolerates     Quality-Core Measures   Reviewed items::  Radiology images reviewed, Labs reviewed and Medications reviewed

## 2018-03-11 NOTE — PROGRESS NOTES
[facility] Progress Note      Admit Date: 3/9/2018  ICU Day: 2    Resident(s): Naima Marcano  Attending: MARY ANN OLMOS/ Dr. Albert    Date & Time:   3/11/2018   1:20 PM       Patient ID:    Name:             August Samuel   YOB: 1990  Age:                 27 y.o.  male   MRN:               [id number]    HPI:    28 yo male   with a hx of esophageal varices and liver cirrhosis 2/2 Alcohol presented to the ED with hematemesis, blood in stool and lightheadedness. Patient had banding of his esophageal varices in 10/2017. Patient states stopped drinking alcohol after procedure in October 2017.  In the ED, he was found to be tachycardic, /46, Hgb was 3.5. Massive transfusion protocol was initiated and he was given 1unit of FFP, platelets  and PRBC. A central line was placed. GI was consulted.   Patient received so many blood products PRBC, Platelets, FFP before emergent EGD as he was having bloody emesis with clots and bloody stools  He had an EGD on 03/ 10 which showed large esophageal varices which was banded x5 and gastric varices. Hgb has been stable ~ 7.  He is scheduled for a TIPS procedure tomorrow 03/12       Consultants:    GI : Dr Mendoza ,Dr Richards.    PMA: Dr Yin    Interval Events:    Admitted 03/09 for UGIB. Hx of alcoholic cirrhosis and esophageal banding in 10/2017.  S/p EGD 03/10 : large esophageal varices s/p banding of esophageal varices x 5 and gastric varices.  Had bowel movement overnight, brown stool, no blood clots no hematemesis  Hgb stable ~ 7.   Echo to evaluate right heart pending   Per GI possible TIPS tomorrow. NPO @[facility].    Review of Systems   Constitutional: Negative for chills and fever.   HENT: Negative for congestion and sore throat.    Respiratory: Negative for shortness of breath and wheezing.    Cardiovascular: Negative for chest pain, claudication and leg swelling.   Gastrointestinal: Positive for blood in stool. Negative for abdominal pain and  "vomiting.   Genitourinary: Negative for frequency, hematuria and urgency.   Musculoskeletal: Negative for joint pain and neck pain.   Neurological: Positive for weakness. Negative for seizures and headaches.   Psychiatric/Behavioral: The patient does not have insomnia.        PHYSICAL EXAM  Vitals:    03/11/18 0800 03/11/18 0900 03/11/18 1000 03/11/18 1008   BP:    (!) 99/65   Pulse: 83 69 73 82   Resp: 20 16 19 17   Temp:    36.7 °C (98.1 °F)   SpO2: 99% 96% 96% 97%   Weight:       Height:         Body mass index is 29.49 kg/m².  BP (!) 99/65   Pulse 82   Temp 36.7 °C (98.1 °F)   Resp 17   Ht 1.803 m (5' 11\")   Wt 95.9 kg (211 lb 6.7 oz)   SpO2 97%   BMI 29.49 kg/m²   O2 therapy: Pulse Oximetry: 97 %, O2 (LPM): 0, O2 Delivery: None (Room Air)    Physical Exam   Constitutional: He is oriented to person, place, and time and well-developed, well-nourished, and in no distress.   HENT:   Head: Normocephalic and atraumatic.   Right Ear: External ear normal.   Left Ear: External ear normal.   Eyes: Pupils are equal, round, and reactive to light. Scleral icterus is present.   pale   Neck: Normal range of motion. No JVD present.   Cardiovascular: Regular rhythm, normal heart sounds and intact distal pulses.  Exam reveals no gallop and no friction rub.    No murmur heard.  tachycardic   Pulmonary/Chest: Effort normal and breath sounds normal. No respiratory distress. He has no wheezes. He has no rales.   Abdominal: Soft. Bowel sounds are normal. He exhibits no distension. There is no tenderness. There is no rebound.   Musculoskeletal: He exhibits no edema or tenderness.   Neurological: He is alert and oriented to person, place, and time. No cranial nerve deficit.       Respiratory:     Respiration: 17, Pulse Oximetry: 97 %    Chest Tube Drains:    Recent Labs      03/10/18   1137   ISTATTEMP  36.2 C   ISTATFIO2  21   ISTATSPEC  Venous       HemoDynamics:  Pulse: 82, Heart Rate (Monitored): 72 Blood Pressure: (!) " , NIBP: (!)       Neuro:      Fluids:    Date 18 0700 - 18 0659   Shift 0780-8057 5203-9305 6511-6264 24 Hour Total   I  N  T  A  K  E   P.O. 120   120      P.O. 120   120    I.V. 304   304      Octreotide Volume 20   20      IV Volume (NS maintenance ) 200   200      IV Volume (Rally bag) 84   84    Shift Total 424   424   O  U  T  P  U  T   Stool          Number of Times Stooled 1 x   1 x    Shift Total          424        Intake/Output Summary (Last 24 hours) at 03/10/18 1247  Last data filed at 03/10/18 1220   Gross per 24 hour   Intake          1629.92 ml   Output             1825 ml   Net          -195.08 ml          Body mass index is 29.49 kg/m².    Recent Labs      18   1600  18   2345  03/10/18   0457  18   0418   SODIUM  137  139  140   --    POTASSIUM  3.6  4.1  4.1   --    CHLORIDE  109  110  112   --    CO2  19*  22  23   --    BUN  22  22  22   --    CREATININE  0.86  0.79  0.80   --    MAGNESIUM   --    --   1.6  1.9   PHOSPHORUS   --    --   4.1   --    CALCIUM  8.0*  7.9*  7.9*   --        GI/Nutrition:  Recent Labs      18   1600  18   2345  03/10/18   0457   ALTSGPT  20   --   18   ASTSGOT  25   --   24   ALKPHOSPHAT  56   --   43   TBILIRUBIN  0.7   --   2.1*   LIPASE  21   --    --    GLUCOSE  187*  138*  122*       Heme:  Recent Labs      18   1600   03/10/18   0457  03/10/18   1600  03/10/18   2245  18   0418   RBC  1.52*   < >  2.12*  2.37*  2.40*  2.35*   HEMOGLOBIN  3.5*   < >  5.6*  6.9*  7.0*  7.0*   HEMATOCRIT  11.8*   < >  17.1*  20.2*  20.3*  20.3*   PLATELETCT  332   < >  164  137*  113*  107*   PROTHROMBTM  18.2*   --   17.2*   --    --    --    APTT  32.7   --   32.2   --    --    --    INR  1.54*   --   1.44*   --    --    --     < > = values in this interval not displayed.       Infectious Disease:  Temp  Av.3 °C (97.4 °F)  Min: 35.8 °C (96.5 °F)  Max: 36.8 °C (98.2 °F)  Recent Labs      18   1600    03/10/18   0457  03/10/18   1600  03/10/18   2245  03/11/18   0418   WBC  22.8*   < >  12.7*  12.2*  10.7  8.6   NEUTSPOLYS  60.80   < >  57.70  65.20  55.80  60.20   LYMPHOCYTES  31.50   < >  35.60  28.70  36.10  31.10   MONOCYTES  5.50   < >  5.10  4.40  5.20  5.00   EOSINOPHILS  1.00   < >  0.60  0.40  1.80  2.30   BASOPHILS  0.10   < >  0.40  0.60  0.70  0.70   ASTSGOT  25   --   24   --    --    --    ALTSGPT  20   --   18   --    --    --    ALKPHOSPHAT  56   --   43   --    --    --    TBILIRUBIN  0.7   --   2.1*   --    --    --     < > = values in this interval not displayed.       Meds:  • NS       • ondansetron  4 mg     • fentaNYL  25 mcg     • thiamine  100 mg      And   • folic acid  1 mg      And   • multivitamin  1 Tab     • iohexol  100 mL     • cefTRIAXone (ROCEPHIN) IV  2 g     • NS   100 mL/hr at 03/11/18 0844   • insulin regular  1-6 Units      And   • glucose 4 g  16 g      And   • dextrose 50%  25 mL     • pantoprazole  40 mg     • Octreotide infusion  50 mcg/hr 50 mcg/hr (03/11/18 0800)   • nicotine  14 mg      And   • nicotine polacrilex  2 mg          Procedures:  None     Imaging:  ECHOCARDIOGRAM COMP W/O CONT         CT-ABDOMEN-PELVIS WITH   Final Result      1.  Gastroesophageal and splenorenal varices with recanalization of the umbilical vein. There is also prominent splenomegaly consistent with portal hypertension. There is small amount of ascites.      2.  Fatty liver.      3.  Enlarged periportal and portacaval lymph nodes.      4.  No evidence of bowel obstruction.      DX-CHEST-PORTABLE (1 VIEW)   Final Result      1.  No acute cardiopulmonary disease.   2.  Supportive tubing as described above.          Problem and Plan:      * Esophageal varices with bleeding (CMS-HCC)- (present on admission)   Assessment & Plan       Hx of Alcoholic liver disease .   S/p esophageal variceal  Banding in 10/2017.  Per patient he stoppped drinking alcohol after diagnosis and procedure in  October.  Did not have regular follow up due to insurance issues  Recent episode of GI bleed, ? Precipitating factor.  Presented to the ED with hx of hemtemesis, light headedness and Hgb of ~3  Transfused with 1unit of PRBC, FFP and platelets.  Started on octreotide , PPI and GI consulted   Admitted to the ICU, still having hematemesis, bloody stool  with Hgb ~5, after having received numerous blood products: PRBC, FFP and platelets, from the [facility].  S/p EGD 03/10 : large esophageal varices s/p banding of esophageal varices x 5 and gastric varices.  Planned TIPS tomorrow : 03/12/18. NPO @ midnight  Echo today to evaluate right heart.  Resumed diet today: clear liquid.  Continue PPI and octreotide.  Transfuse if Hgb <7                           Alcoholic cirrhosis (CMS-HCC)- (present on admission)   Assessment & Plan    Significant drinking history ~ 8years.  Patient states he quit in October 2017 after a diagnosis of liver cirrhosis and esophageal variceal banding .   Ultrasound 10/2017 showed enlarged liver, fatty infiltration and/or cirrhotic change  EGD done  10/2017 showed Esophageal Varices, Gastrica varices and portal-hypertensive gastropathy .   No evidence of hepatic encephalopathy.  S/p EGD 03/10 : large esophageal varices s/p banding of esophageal varices x 5 and gastric varices.  Planned TIPS for tomorrow per GI.                    Peripheral neuropathy (CMS-HCC)   Assessment & Plan    Likely due to hx of alcohol , DM  Last HgbA1c 8.4.  On metformin at home.     Plan  Holding metformin. ISS . Tight glycemic control.        Diabetes mellitus (CMS-HCC)- (present on admission)   Assessment & Plan    Type 2, diagnosed in 10/2017, on metformin.   With neuropathy, likely due to alcohol use.  Hgb A1C: 08/30/17: 8.4    Plan  Hold ISS.  Hypoglycemic protocol.              DISPO: ICU    CODE STATUS: full    Quality Measures:  Larson Catheter: None   DVT Prophylaxis: None  Ulcer Prophylaxis: On PPI drip for GI  bleed   Antibiotics: C3 for SBP prophylaxis  Lines: RIJ, PIV

## 2018-03-11 NOTE — CARE PLAN
Problem: Bowel/Gastric:  Goal: Normal bowel function is maintained or improved  No dc clots in the stool, an improvement over previous BMs    Intervention: Educate patient and significant other/support system about diet, fluid intake, medications and activity to promote bowel function  Spoke to pt and wife about post TIPS requirement of taking lactulose to control serum ammonia

## 2018-03-11 NOTE — PROGRESS NOTES
Pulmonary Critical Care Progress Note        Admit 3/9/2018    Chief Complaint: Hematemesis    History of Present Illness:   This is a 27-year-old male who is a    from the [place] deploy[place], presents with history of lightheadedness,   dizziness, hematemesis, and melena.  The patient states that he has known   history of esophageal varices as well as with early cirrhosis and has had been   drinking heavily for the last 10 years, but quit approximately 4 months ago.    The patient at that point had esophageal varices and was banded.  The patient   states that he has had ongoing bleeding.  The patient has had no significant   chest pain or shortness of breath associated with this.  He states that he was   near syncopal.  His heart rate has been elevated as well.  The patient's wife   did help him with getting him off the floor after he was very weak and   lightheaded.  Once ambulance arrived, IV was established and the patient was   transferred for higher level of care.  The patient has at this time no   routine.    Review of Systems   Constitutional: Positive for malaise/fatigue. Negative for chills, diaphoresis and fever.   HENT: Negative for congestion, nosebleeds and sore throat.    Eyes: Negative.    Respiratory: Negative for cough, hemoptysis, sputum production and shortness of breath.    Cardiovascular: Negative for chest pain and palpitations.   Gastrointestinal: Positive for abdominal pain (improved), blood in stool, melena and nausea. Negative for diarrhea, heartburn and vomiting.   Genitourinary: Negative.    Musculoskeletal: Negative.    Skin: Negative.    Neurological: Positive for weakness. Negative for sensory change, speech change, focal weakness and headaches.   Endo/Heme/Allergies: Negative.    Psychiatric/Behavioral: The patient has insomnia. The patient is not nervous/anxious.        Interval Events:  24 hour interval history reviewed    - 2 stools without blood   - Neuro: AOx4   -  HR: 70s-110s   - BP: 90s-110s systolic   - GI: stools as above, NPO p MN for TIPS tomorrow   - UOP: 1180 mL/24   - Larson: no   - Tm: 36.8   - Lines: RIJ CVC   - PPx: GI ppi gtt, DVT SCDs   - TIPS tomorrow    Yesterday  A&O x4  Melena/hematemesis - last 2 days - ok early AM  Hct 11.8 on admit -> 13.6 ->  S/p plt x3, FFP 3 and 7 units pRBCs as of 10:30  INR 1.44 this AM  SR/ST 80-110s - 80s now - lower when sleeping  Bp 70-100s  R IJ CVC  I/Os reviewed - incomplete  Tm 99.7  NPO  Room air  CXR no film today - last night - clear, no PTX, R IJ  Octreotide drip  Protonix drip  Ceftriaxone  Reviewed with family    Serial F/u  Bp remains soft - mentation ok - SR -> ST  UO ok  Recurrent hematemesis 11AM - 1000cc  N/V/ABD pain better after vomiting  Zofran  2 more units pRBCs ordered stat along with 3 FFP  TEG/mapping sent - > normal/mildly hypercoaguable?  D/w Team red early call for RED herb as needed  Reviewed with family    D/w GI re plans    Reviewed need for TIPs procedure with patient and staff  ECHO/CTA pending  Keep NPO  Vits to IV  IR aware      PFSH:  No change.    Physical Exam   Constitutional: He appears well-developed and well-nourished. He is cooperative. He appears ill. No distress.   HENT:   Head: Normocephalic and atraumatic.   Eyes: Pupils are equal, round, and reactive to light. Scleral icterus is present.   Neck: Phonation normal. No JVD present.   Cardiovascular: Normal rate, regular rhythm, normal heart sounds and intact distal pulses.  Exam reveals no gallop and no friction rub.    No murmur heard.  Pulmonary/Chest: Effort normal. No respiratory distress. He has no wheezes. He has no rales.   Abdominal: Soft. Bowel sounds are normal. He exhibits fluid wave and ascites. He exhibits no mass. There is no hepatosplenomegaly. There is tenderness. There is no rebound, no guarding and negative Ferrell's sign.   Neurological: He is alert. No cranial nerve deficit. He exhibits normal muscle tone. Coordination  normal.   Skin: Skin is warm and dry. Capillary refill takes less than 2 seconds. He is not diaphoretic. No erythema.   Psychiatric: He has a normal mood and affect. His behavior is normal. Thought content normal.   Nursing note and vitals reviewed.      Respiratory:     Pulse Oximetry: 100 %        Room air    ImagingCXR  I have personally reviewed the chest x-ray my impression is  noted above and films are reviewed with Team on rounds  Recent Labs      03/10/18   1137   ISTATTEMP  36.2 C   ISTATFIO2  21   ISTATSPEC  Venous        HemoDynamics:  Pulse: (!) 114, Heart Rate (Monitored): (!) 114  Blood Pressure: 100/49, NIBP: 100/73       Imaging: Available data reviewed  Recent Labs      03/09/18   1600   TROPONINI  <0.01       Neuro:  GCS Total Maria Victoria Coma Score: 15     Imaging: Available data reviewed    Fluids:  Intake/Output       03/09/18 0700 - 03/10/18 0659 03/10/18 0700 - 03/11/18 0659 03/11/18 0700 - 03/12/18 0659      6838-3455 3865-8481 Total 3465-3433 7166-0531 Total 7117-5929 1595-4633 Total       Intake    P.O.  --  -- --  0  -- 0  --  -- --    P.O. -- -- -- 0 -- 0 -- -- --    I.V.  --  -- --  2452.2  1824 4276.2  --  -- --    Crystalloid Intake -- -- -- 1000 -- 1000 -- -- --    Octreotide Volume -- -- -- 252.2 120 372.2 -- -- --    IV Volume (NS maintenance ) -- -- -- 1200 1200 2400 -- -- --    IV Volume (Rally bag) -- -- -- -- 504 504 -- -- --    Blood  --  826.2 826.2  1651.3  -- 1651.3  --  -- --    PRBC Total Volume (Non-Barcoded) -- -- -- 700 -- 700 -- -- --    Volume (RELEASE PLATELET PHERESIS) -- 138.7 138.7 -- -- -- -- -- --    Volume (RELEASE PLATELET PHERESIS) -- 312.5 312.5 -- -- -- -- -- --    Volume (RELEASE RED BLOOD CELLS) -- 187.5 187.5 -- -- -- -- -- --    Volume (RELEASE RED BLOOD CELLS) -- 187.5 187.5 -- -- -- -- -- --    Volume (RELEASE RED BLOOD CELLS) -- -- -- 378.8 -- 378.8 -- -- --    Volume (RELEASE RED BLOOD CELLS) -- -- -- 425 -- 425 -- -- --    Volume (RELEASE FRESH FROZEN  PLASMA) -- -- -- 50 -- 50 -- -- --    Volume (RELEASE FRESH FROZEN PLASMA) -- -- -- 30 -- 30 -- -- --    Volume (RELEASE FRESH FROZEN PLASMA) -- -- -- 30 -- 30 -- -- --    Volume (RELEASE RED BLOOD CELLS-ACTIVE BLEEDING) -- -- -- 37.5 -- 37.5 -- -- --    Total Intake -- 826.2 826.2 4103.4 1824 5927.4 -- -- --       Output    Urine  --  825 825  480  1 481  --  -- --    Number of Times Voided -- -- -- -- 2 x 2 x -- -- --    Void (ml) -- 825 825 480 1 481 -- -- --    Emesis  --  -- --  1000  -- 1000  --  -- --    Emesis -- -- -- 1000 -- 1000 -- -- --    Emesis - Number of Times -- -- -- 2 x -- 2 x -- -- --    Stool  --  -- --  --  -- --  --  -- --    Number of Times Stooled -- -- -- 2 x 2 x 4 x -- -- --    Blood  --  -- --  1000  -- 1000  --  -- --    Est. Blood Loss (mL) -- -- -- 1000 -- 1000 -- -- --    Total Output --  1 2481 -- -- --       Net I/O     -- 1.2 1.2 1623.4 1823 3446.4 -- -- --           Recent Labs      03/09/18   1600  03/09/18   2345  03/10/18   0457  03/11/18   0418   SODIUM  137  139  140   --    POTASSIUM  3.6  4.1  4.1   --    CHLORIDE  109  110  112   --    CO2  19*  22  23   --    BUN  22  22  22   --    CREATININE  0.86  0.79  0.80   --    MAGNESIUM   --    --   1.6  1.9   PHOSPHORUS   --    --   4.1   --    CALCIUM  8.0*  7.9*  7.9*   --        GI/Nutrition:  Imaging: Available data reviewed   U/s ABD 10/11/2018:  1.  Enlarged liver with increased echogenicity which represent fatty infiltration and/or cirrhotic change. No mass or biliary dilatation.  2.  No cholelithiasis. Contracted gallbladder.  3.  Normal appearance of the pancreas.  4.  Normal hepatopedal flow in the portal vein. No portal vein enlargement.  5.  No ascites.  6.  Splenomegaly. Small accessory spleen is present.    NPO     Liver Function  Recent Labs      03/09/18   1600  03/09/18   2345  03/10/18   0457   ALTSGPT  20   --   18   ASTSGOT  25   --   24   ALKPHOSPHAT  56   --   43   TBILIRUBIN  0.7   --   2.1*    LIPASE  21   --    --    GLUCOSE  187*  138*  122*       Heme:  Recent Labs      03/09/18   1600   03/10/18   0457  03/10/18   1600  03/10/18   2245  03/11/18   0418   RBC  1.52*   < >  2.12*  2.37*  2.40*  2.35*   HEMOGLOBIN  3.5*   < >  5.6*  6.9*  7.0*  7.0*   HEMATOCRIT  11.8*   < >  17.1*  20.2*  20.3*  20.3*   PLATELETCT  332   < >  164  137*  113*  107*   PROTHROMBTM  18.2*   --   17.2*   --    --    --    APTT  32.7   --   32.2   --    --    --    INR  1.54*   --   1.44*   --    --    --     < > = values in this interval not displayed.       Infectious Disease:  Temp  Av.3 °C (97.3 °F)  Min: 35.8 °C (96.5 °F)  Max: 36.8 °C (98.2 °F)  Micro: reviewed  Recent Labs      03/09/18   1600   03/10/18   0457  03/10/18   1600  03/10/18   2245  03/11/18   0418   WBC  22.8*   < >  12.7*  12.2*  10.7  8.6   NEUTSPOLYS  60.80   < >  57.70  65.20  55.80  60.20   LYMPHOCYTES  31.50   < >  35.60  28.70  36.10  31.10   MONOCYTES  5.50   < >  5.10  4.40  5.20  5.00   EOSINOPHILS  1.00   < >  0.60  0.40  1.80  2.30   BASOPHILS  0.10   < >  0.40  0.60  0.70  0.70   ASTSGOT  25   --   24   --    --    --    ALTSGPT  20   --   18   --    --    --    ALKPHOSPHAT  56   --   43   --    --    --    TBILIRUBIN  0.7   --   2.1*   --    --    --     < > = values in this interval not displayed.     Current Facility-Administered Medications   Medication Dose Frequency Provider Last Rate Last Dose   • ondansetron (ZOFRAN) syringe/vial injection 4 mg  4 mg Q4HRS PRN Jim Albert M.D.   4 mg at 03/10/18 1103   • fentaNYL (SUBLIMAZE) injection 25 mcg  25 mcg Q HOUR PRN Jim Albert M.D.       • thiamine (THIAMINE) tablet 100 mg  100 mg DAILY Jim Albert M.D.   Stopped at 03/10/18 1530    And   • folic acid (FOLVITE) tablet 1 mg  1 mg DAILY Jim Albert M.D.        And   • multivitamin (THERAGRAN) tablet 1 Tab  1 Tab DAILY Jim Albert M.D.       • iohexol (OMNIPAQUE) 350 mg/mL  100 mL Once Vincenzo PAPPAS  ALEENA Lr       • cefTRIAXone (ROCEPHIN) syringe 2 g  2 g Q24HRS Lance Melo M.D.   2 g at 03/10/18 1809   • NS infusion   Continuous Lance Melo M.D. 100 mL/hr at 03/10/18 1810     • insulin regular (HUMULIN R) injection 1-6 Units  1-6 Units TID Lance Mleo M.D.   Stopped at 03/09/18 2100    And   • glucose 4 g chewable tablet 16 g  16 g Q15 MIN PRN Lance Melo M.D.        And   • dextrose 50% (D50W) injection 25 mL  25 mL Q15 MIN PRN Lance Melo M.D.       • pantoprazole (PROTONIX) injection 40 mg  40 mg BID Lance Melo M.D.   40 mg at 03/10/18 2037   • octreotide (SANDOSTATIN) 1,250 mcg in  mL Infusion  50 mcg/hr Continuous Lance Melo M.D. 10 mL/hr at 03/10/18 1846 50 mcg/hr at 03/10/18 1846   • nicotine (NICODERM) 14 MG/24HR 14 mg  14 mg Daily-0600 Lance Melo M.D.   14 mg at 03/11/18 0600    And   • nicotine polacrilex (NICORETTE) 2 MG piece 2 mg  2 mg Q HOUR PRN Lance Melo M.D.         Last reviewed on 3/9/2018  9:13 PM by Carmen Pham R.N.    Quality  Measures:  Core Measures & Quality Metrics    Problems/plan:  Hemorrhagic shock -> Severe blood loss anemia/hypotension   - NPO   - Continue fluid and blood product resuscitation as needed   - Serial CBC and TEG/mapping    Massive upper gastrointestinal bleed   -  from esophageal varices in the setting of portal hypertension   - H/o prior GI bleed - variceal   - s/p variceal banding   - TIPS later tomorrow   - ECHO pending   - continue Octreotide infusion   - continue Protonix infusion    Alcohol abuse    - The patient currently reportedly sober, reports last drink months ago   - H/o cirrhosis secondary to ETOH   - Monitor for withdrawal   - Thiamine/MVI/Folate    Microcytic anemia.   - When acute process over assess iron stores and consider iron therapy    Metabolic acidosis with predominant lactic acidosis.   - Probably secondary to anemia   - Transfuse as needed   - Nothing to suggest sepsis   - Empiric treatment  for SBP prophylaxis with Ceftriaxone    Coagulopathy, likely related to acute on chronic liver disease.   - TEG normal   - Further blood products as needed     TOB use   - Cessation encouraged   - Nicotine patch    Diabetes Mellitus Type II   - H/o neuropathy   - Hold Metformin/ACE/ARB   - SSI, accuchecks    Prophylaxis: SCDs, protonix    Prognosis remains guarded.  Critically ill, TIPS planned for tomorrow, will maintain hemodynamics overnight.  High risk of deterioration and worsening vital organ dysfunction and death without the above critical care interventions.     Discussed patient condition and risk of morbidity and/or mortality with Family, RN, RT, Pharmacy, [facility] resident, Charge nurse / hot rounds, Patient, GI and Interventional Radiology.    The patient remains critically ill.  Critical care time = 32 minutes in directly providing and coordinating critical care and extensive data review.  No time overlap and excludes procedures.

## 2018-03-12 LAB
ALBUMIN SERPL BCP-MCNC: 2.7 G/DL (ref 3.2–4.9)
ALBUMIN/GLOB SERPL: 1.2 G/DL
ALP SERPL-CCNC: 45 U/L (ref 30–99)
ALT SERPL-CCNC: 21 U/L (ref 2–50)
ANION GAP SERPL CALC-SCNC: 3 MMOL/L (ref 0–11.9)
AST SERPL-CCNC: 26 U/L (ref 12–45)
BASOPHILS # BLD AUTO: 0.4 % (ref 0–1.8)
BASOPHILS # BLD AUTO: 0.5 % (ref 0–1.8)
BASOPHILS # BLD AUTO: 0.6 % (ref 0–1.8)
BASOPHILS # BLD AUTO: 0.9 % (ref 0–1.8)
BASOPHILS # BLD: 0.03 K/UL (ref 0–0.12)
BASOPHILS # BLD: 0.03 K/UL (ref 0–0.12)
BASOPHILS # BLD: 0.04 K/UL (ref 0–0.12)
BASOPHILS # BLD: 0.07 K/UL (ref 0–0.12)
BILIRUB SERPL-MCNC: 1.5 MG/DL (ref 0.1–1.5)
BUN SERPL-MCNC: 8 MG/DL (ref 8–22)
CALCIUM SERPL-MCNC: 7.5 MG/DL (ref 8.5–10.5)
CHLORIDE SERPL-SCNC: 110 MMOL/L (ref 96–112)
CO2 SERPL-SCNC: 24 MMOL/L (ref 20–33)
CREAT SERPL-MCNC: 0.7 MG/DL (ref 0.5–1.4)
EOSINOPHIL # BLD AUTO: 0.21 K/UL (ref 0–0.51)
EOSINOPHIL # BLD AUTO: 0.21 K/UL (ref 0–0.51)
EOSINOPHIL # BLD AUTO: 0.23 K/UL (ref 0–0.51)
EOSINOPHIL # BLD AUTO: 0.29 K/UL (ref 0–0.51)
EOSINOPHIL NFR BLD: 3 % (ref 0–6.9)
EOSINOPHIL NFR BLD: 3.2 % (ref 0–6.9)
EOSINOPHIL NFR BLD: 3.4 % (ref 0–6.9)
EOSINOPHIL NFR BLD: 3.6 % (ref 0–6.9)
ERYTHROCYTE [DISTWIDTH] IN BLOOD BY AUTOMATED COUNT: 50.6 FL (ref 35.9–50)
ERYTHROCYTE [DISTWIDTH] IN BLOOD BY AUTOMATED COUNT: 50.7 FL (ref 35.9–50)
ERYTHROCYTE [DISTWIDTH] IN BLOOD BY AUTOMATED COUNT: 51.2 FL (ref 35.9–50)
ERYTHROCYTE [DISTWIDTH] IN BLOOD BY AUTOMATED COUNT: 51.9 FL (ref 35.9–50)
GLOBULIN SER CALC-MCNC: 2.2 G/DL (ref 1.9–3.5)
GLUCOSE BLD-MCNC: 134 MG/DL (ref 65–99)
GLUCOSE BLD-MCNC: 179 MG/DL (ref 65–99)
GLUCOSE BLD-MCNC: 190 MG/DL (ref 65–99)
GLUCOSE BLD-MCNC: 98 MG/DL (ref 65–99)
GLUCOSE SERPL-MCNC: 125 MG/DL (ref 65–99)
HCT VFR BLD AUTO: 22.4 % (ref 42–52)
HCT VFR BLD AUTO: 23.1 % (ref 42–52)
HCT VFR BLD AUTO: 23.8 % (ref 42–52)
HCT VFR BLD AUTO: 24.1 % (ref 42–52)
HGB BLD-MCNC: 7.3 G/DL (ref 14–18)
HGB BLD-MCNC: 7.7 G/DL (ref 14–18)
HGB BLD-MCNC: 7.9 G/DL (ref 14–18)
HGB BLD-MCNC: 8 G/DL (ref 14–18)
IMM GRANULOCYTES # BLD AUTO: 0.02 K/UL (ref 0–0.11)
IMM GRANULOCYTES # BLD AUTO: 0.02 K/UL (ref 0–0.11)
IMM GRANULOCYTES # BLD AUTO: 0.03 K/UL (ref 0–0.11)
IMM GRANULOCYTES # BLD AUTO: 0.03 K/UL (ref 0–0.11)
IMM GRANULOCYTES NFR BLD AUTO: 0.3 % (ref 0–0.9)
IMM GRANULOCYTES NFR BLD AUTO: 0.3 % (ref 0–0.9)
IMM GRANULOCYTES NFR BLD AUTO: 0.4 % (ref 0–0.9)
IMM GRANULOCYTES NFR BLD AUTO: 0.5 % (ref 0–0.9)
INR PPP: 1.38 (ref 0.87–1.13)
LYMPHOCYTES # BLD AUTO: 2.03 K/UL (ref 1–4.8)
LYMPHOCYTES # BLD AUTO: 2.31 K/UL (ref 1–4.8)
LYMPHOCYTES # BLD AUTO: 2.37 K/UL (ref 1–4.8)
LYMPHOCYTES # BLD AUTO: 3.03 K/UL (ref 1–4.8)
LYMPHOCYTES NFR BLD: 31.1 % (ref 22–41)
LYMPHOCYTES NFR BLD: 33.9 % (ref 22–41)
LYMPHOCYTES NFR BLD: 34 % (ref 22–41)
LYMPHOCYTES NFR BLD: 38.1 % (ref 22–41)
MAGNESIUM SERPL-MCNC: 1.9 MG/DL (ref 1.5–2.5)
MCH RBC QN AUTO: 28.3 PG (ref 27–33)
MCH RBC QN AUTO: 28.9 PG (ref 27–33)
MCH RBC QN AUTO: 29 PG (ref 27–33)
MCH RBC QN AUTO: 29.3 PG (ref 27–33)
MCHC RBC AUTO-ENTMCNC: 32.6 G/DL (ref 33.7–35.3)
MCHC RBC AUTO-ENTMCNC: 33.2 G/DL (ref 33.7–35.3)
MCHC RBC AUTO-ENTMCNC: 33.2 G/DL (ref 33.7–35.3)
MCHC RBC AUTO-ENTMCNC: 33.3 G/DL (ref 33.7–35.3)
MCV RBC AUTO: 86.8 FL (ref 81.4–97.8)
MCV RBC AUTO: 86.8 FL (ref 81.4–97.8)
MCV RBC AUTO: 87.3 FL (ref 81.4–97.8)
MCV RBC AUTO: 88.1 FL (ref 81.4–97.8)
MONOCYTES # BLD AUTO: 0.28 K/UL (ref 0–0.85)
MONOCYTES # BLD AUTO: 0.32 K/UL (ref 0–0.85)
MONOCYTES # BLD AUTO: 0.35 K/UL (ref 0–0.85)
MONOCYTES # BLD AUTO: 0.42 K/UL (ref 0–0.85)
MONOCYTES NFR BLD AUTO: 4.3 % (ref 0–13.4)
MONOCYTES NFR BLD AUTO: 4.7 % (ref 0–13.4)
MONOCYTES NFR BLD AUTO: 5 % (ref 0–13.4)
MONOCYTES NFR BLD AUTO: 5.3 % (ref 0–13.4)
NEUTROPHILS # BLD AUTO: 3.89 K/UL (ref 1.82–7.42)
NEUTROPHILS # BLD AUTO: 3.94 K/UL (ref 1.82–7.42)
NEUTROPHILS # BLD AUTO: 4.01 K/UL (ref 1.82–7.42)
NEUTROPHILS # BLD AUTO: 4.11 K/UL (ref 1.82–7.42)
NEUTROPHILS NFR BLD: 51.7 % (ref 44–72)
NEUTROPHILS NFR BLD: 57.2 % (ref 44–72)
NEUTROPHILS NFR BLD: 57.2 % (ref 44–72)
NEUTROPHILS NFR BLD: 60.4 % (ref 44–72)
NRBC # BLD AUTO: 0 K/UL
NRBC BLD-RTO: 0 /100 WBC
PLATELET # BLD AUTO: 103 K/UL (ref 164–446)
PLATELET # BLD AUTO: 109 K/UL (ref 164–446)
PLATELET # BLD AUTO: 116 K/UL (ref 164–446)
PLATELET # BLD AUTO: 97 K/UL (ref 164–446)
PMV BLD AUTO: 10.7 FL (ref 9–12.9)
PMV BLD AUTO: 12 FL (ref 9–12.9)
PMV BLD AUTO: 12.1 FL (ref 9–12.9)
PMV BLD AUTO: 12.4 FL (ref 9–12.9)
POTASSIUM SERPL-SCNC: 3.7 MMOL/L (ref 3.6–5.5)
PROT SERPL-MCNC: 4.9 G/DL (ref 6–8.2)
PROTHROMBIN TIME: 16.7 SEC (ref 12–14.6)
RBC # BLD AUTO: 2.58 M/UL (ref 4.7–6.1)
RBC # BLD AUTO: 2.66 M/UL (ref 4.7–6.1)
RBC # BLD AUTO: 2.7 M/UL (ref 4.7–6.1)
RBC # BLD AUTO: 2.76 M/UL (ref 4.7–6.1)
SODIUM SERPL-SCNC: 137 MMOL/L (ref 135–145)
WBC # BLD AUTO: 6.5 K/UL (ref 4.8–10.8)
WBC # BLD AUTO: 6.8 K/UL (ref 4.8–10.8)
WBC # BLD AUTO: 7 K/UL (ref 4.8–10.8)
WBC # BLD AUTO: 8 K/UL (ref 4.8–10.8)

## 2018-03-12 PROCEDURE — 700111 HCHG RX REV CODE 636 W/ 250 OVERRIDE (IP): Performed by: INTERNAL MEDICINE

## 2018-03-12 PROCEDURE — 82962 GLUCOSE BLOOD TEST: CPT | Mod: 91

## 2018-03-12 PROCEDURE — C9113 INJ PANTOPRAZOLE SODIUM, VIA: HCPCS | Performed by: INTERNAL MEDICINE

## 2018-03-12 PROCEDURE — 770022 HCHG ROOM/CARE - ICU (200)

## 2018-03-12 PROCEDURE — A9270 NON-COVERED ITEM OR SERVICE: HCPCS | Performed by: INTERNAL MEDICINE

## 2018-03-12 PROCEDURE — 85610 PROTHROMBIN TIME: CPT

## 2018-03-12 PROCEDURE — 85025 COMPLETE CBC W/AUTO DIFF WBC: CPT

## 2018-03-12 PROCEDURE — 700102 HCHG RX REV CODE 250 W/ 637 OVERRIDE(OP): Performed by: INTERNAL MEDICINE

## 2018-03-12 PROCEDURE — 700105 HCHG RX REV CODE 258: Performed by: INTERNAL MEDICINE

## 2018-03-12 PROCEDURE — 83735 ASSAY OF MAGNESIUM: CPT

## 2018-03-12 PROCEDURE — 80053 COMPREHEN METABOLIC PANEL: CPT

## 2018-03-12 RX ORDER — POTASSIUM CHLORIDE 20 MEQ/1
40 TABLET, EXTENDED RELEASE ORAL ONCE
Status: COMPLETED | OUTPATIENT
Start: 2018-03-12 | End: 2018-03-12

## 2018-03-12 RX ADMIN — THERA TABS 1 TABLET: TAB at 08:22

## 2018-03-12 RX ADMIN — FOLIC ACID 1 MG: 1 TABLET ORAL at 08:22

## 2018-03-12 RX ADMIN — SODIUM CHLORIDE 1000 ML: 9 INJECTION, SOLUTION INTRAVENOUS at 12:35

## 2018-03-12 RX ADMIN — PANTOPRAZOLE SODIUM 40 MG: 40 INJECTION, POWDER, LYOPHILIZED, FOR SOLUTION INTRAVENOUS at 21:00

## 2018-03-12 RX ADMIN — INSULIN HUMAN 1 UNITS: 100 INJECTION, SOLUTION PARENTERAL at 15:17

## 2018-03-12 RX ADMIN — CEFTRIAXONE 2 G: 2 INJECTION, POWDER, FOR SOLUTION INTRAMUSCULAR; INTRAVENOUS at 21:06

## 2018-03-12 RX ADMIN — POTASSIUM CHLORIDE 40 MEQ: 1500 TABLET, EXTENDED RELEASE ORAL at 12:35

## 2018-03-12 RX ADMIN — PANTOPRAZOLE SODIUM 40 MG: 40 INJECTION, POWDER, LYOPHILIZED, FOR SOLUTION INTRAVENOUS at 08:24

## 2018-03-12 RX ADMIN — Medication 100 MG: at 08:22

## 2018-03-12 ASSESSMENT — ENCOUNTER SYMPTOMS
EYES NEGATIVE: 1
SPEECH CHANGE: 0
COUGH: 0
WEAKNESS: 0
HEADACHES: 0
CLAUDICATION: 0
INSOMNIA: 1
SENSORY CHANGE: 0
BLOOD IN STOOL: 1
SEIZURES: 0
NAUSEA: 0
SHORTNESS OF BREATH: 0
DIARRHEA: 0
HEMOPTYSIS: 0
VOMITING: 0
MUSCULOSKELETAL NEGATIVE: 1
CHILLS: 0
SORE THROAT: 0
NERVOUS/ANXIOUS: 0
ABDOMINAL PAIN: 0
HEARTBURN: 0
FEVER: 0
INSOMNIA: 0
SPUTUM PRODUCTION: 0
PALPITATIONS: 0
WHEEZING: 0
NECK PAIN: 0
DIAPHORESIS: 0
FOCAL WEAKNESS: 0

## 2018-03-12 ASSESSMENT — PAIN SCALES - GENERAL
PAINLEVEL_OUTOF10: 0

## 2018-03-12 ASSESSMENT — LIFESTYLE VARIABLES: DO YOU DRINK ALCOHOL: NO

## 2018-03-12 NOTE — DISCHARGE PLANNING
Medical SW    Sw attended AM IDT Rounds.    Per face sheet, resident of [facility], male, single, 28yo, admitted 3/9 for acute GI bleed, and carries [place] [employer].     RN reports, pt family at bedside, no over night events, still having black stools, A/Ox4, up to bathroom independetly.      Plan: Sw to assist w/ d/c planning as needed.

## 2018-03-12 NOTE — PROGRESS NOTES
Gastroenterology Progress Note     Author: Vega AUGUSTIN [place]   Date & Time Created: 3/12/2018 12:10 PM    Interval History:  3/10/2018: Denies further hematemesis or melena overnight.  Mild epigastric dyspepsia.  Has received 5U PRBC, 3U FFP and platelets overnight.  Current Hgb 5.  3/11/2018: EGD yesterday with large EV with stigmata of bleeding s/p band x5 and gastric varices.  Hgb stable since banding.  Denies other issues.  Still some old blood in stools.   3/12/2018: No recurrent bleeding since he underwent EGD/banding.  He is reportedly scheduled for TIPS today.     Chief Complaint:  hematemesis      Review of Systems:  ROS    Physical Exam:  Physical Exam   Constitutional: He is oriented to person, place, and time. No distress.   Cardiovascular: Normal rate and regular rhythm.    No murmur heard.  Pulmonary/Chest: Effort normal and breath sounds normal. He has no wheezes. He has no rales.   Abdominal: Soft. Bowel sounds are normal. He exhibits no mass. There is no tenderness.   Neurological: He is alert and oriented to person, place, and time.       Labs:  Recent Labs      03/10/18   1137   ISTATTEMP  36.2 C   ISTATFIO2  21   ISTATSPEC  Venous     Recent Labs      03/09/18   1600   TROPONINI  <0.01     Recent Labs      03/10/18   0457  03/11/18   0418  03/11/18   1346  03/12/18   0247  03/12/18   0930   SODIUM  140   --   136   --   137   POTASSIUM  4.1   --   3.7   --   3.7   CHLORIDE  112   --   110   --   110   CO2  23   --   21   --   24   BUN  22   --   11   --   8   CREATININE  0.80   --   0.76   --   0.70   MAGNESIUM  1.6  1.9   --   1.9   --    PHOSPHORUS  4.1   --    --    --    --    CALCIUM  7.9*   --   7.4*   --   7.5*     Recent Labs      03/09/18   1600   03/10/18   0457  03/11/18   1346  03/12/18   0930   ALTSGPT  20   --   18  21  21   ASTSGOT  25   --   24  29  26   ALKPHOSPHAT  56   --   43  40  45   TBILIRUBIN  0.7   --   2.1*  1.6*  1.5   LIPASE  21   --    --    --    --    GLUCOSE  187*    < >  122*  168*  125*    < > = values in this interval not displayed.     Recent Labs      18   1600   03/10/18   0457   18   1346  18   0930   RBC  1.52*   < >  2.12*   < >  2.78*  2.62*  2.58*  2.66*   HEMOGLOBIN  3.5*   < >  5.6*   < >  8.0*  7.6*  7.3*  7.7*   HEMATOCRIT  11.8*   < >  17.1*   < >  23.8*  22.6*  22.4*  23.1*   PLATELETCT  332   < >  164   < >  113*  101*  97*  103*   PROTHROMBTM  18.2*   --   17.2*   --   16.7*   --   16.7*   --    APTT  32.7   --   32.2   --    --    --    --    --    INR  1.54*   --   1.44*   --   1.38*   --   1.38*   --     < > = values in this interval not displayed.     Recent Labs      03/10/18   0457   18   1346  18   0930   WBC  12.7*   < >  8.9  8.0  8.0  6.5   NEUTSPOLYS  57.70   < >  54.10  54.50  51.70  60.40   LYMPHOCYTES  35.60   < >  36.60  35.90  38.10  31.10   MONOCYTES  5.10   < >  5.20  5.60  5.30  4.30   EOSINOPHILS  0.60   < >  2.80  2.90  3.60  3.20   BASOPHILS  0.40   < >  0.60  0.70  0.90  0.50   ASTSGOT  24   --   29   --    --   26   ALTSGPT  18   --   21   --    --   21   ALKPHOSPHAT  43   --   40   --    --   45   TBILIRUBIN  2.1*   --   1.6*   --    --   1.5    < > = values in this interval not displayed.     Hemodynamics:  Temp (24hrs), Av.7 °C (98 °F), Min:36.5 °C (97.7 °F), Max:36.9 °C (98.4 °F)  Temperature: 36.5 °C (97.7 °F)  Pulse  Av.4  Min: 69  Max: 134Heart Rate (Monitored): 77  Blood Pressure: 117/69, NIBP: 116/77     Respiratory:    Respiration: (!) 22, Pulse Oximetry: 96 %        RUL Breath Sounds: Clear, RML Breath Sounds: Clear, RLL Breath Sounds: Clear, OTTO Breath Sounds: Clear, LLL Breath Sounds: Clear  Fluids:    Intake/Output Summary (Last 24 hours) at 18 1210  Last data filed at 18 1000   Gross per 24 hour   Intake             2429 ml   Output             1200 ml   Net             1229 ml     Weight: 96 kg (211 lb  10.3 oz)  GI/Nutrition:  Orders Placed This Encounter   Procedures   • DIET NPO     Standing Status:   Standing     Number of Occurrences:   1     Order Specific Question:   Restrict to:     Answer:   Strict [1]     Medical Decision Making, by Problem:  Active Hospital Problems    Diagnosis   • *Esophageal varices with bleeding (CMS-HCC) [I85.01]   • Alcoholic cirrhosis (CMS-HCC) [K70.30]   • Peripheral neuropathy (CMS-HCC) [G62.9]   • Diabetes mellitus (CMS-HCC) [E11.9]     PROBLEMS:  1. Hematemesis.  Secondary to esophageal variceal bleed but also with gastric varices as well.  Due to 2 recurrent life threatening bleeds and presence of gastric varices, he is scheduled for TIPS.  2. Acute blood loss anemia, severe.  Required 10 units PRBCs.  Now stable post-banding  3. Cirrhosis, secondary to alcohol versus [place].  No prior liver biopsy.  Stopped alcohol 10/2017  4. Pancytopenia secondary to cirrhosis  5. Coagulopathy secondary to cirrhosis  6. Diabetes mellitus  7. Hyperlipidemia  8. Prior neuropathy secondary to alcohol, improved recently    Plan:  TIPS reportedly scheduled for today.    Quality-Core Measures

## 2018-03-12 NOTE — PROGRESS NOTES
[facility] ICU Progress Note      Admit Date: 3/9/2018  ICU Day: 4    Resident(s): PATRICIA Melo  Attending: MARY ANN OLMOS/ Dr. Albert    Date & Time:   3/12/2018   1:14 PM       Patient ID:    Name:             August Samuel   YOB: 1990  Age:                 27 y.o.  male   MRN:               [id number]    HPI:    28 yo male   with a hx of esophageal varices and liver cirrhosis 2/2 Alcohol presented to the ED with hematemesis, blood in stool and lightheadedness. Patient had banding of his esophageal varices in 10/2017. Patient states stopped drinking alcohol after procedure in October 2017.  In the ED, he was found to be tachycardic, /46, Hgb was 3.5. Massive transfusion protocol was initiated and he was given 1unit of FFP, platelets  and PRBC. A central line was placed. GI was consulted.   Patient received so many blood products PRBC, Platelets, FFP before emergent EGD as he was having bloody emesis with clots and bloody stools  He had an EGD on 03/ 10 which showed large esophageal varices which was banded x5 and gastric varices. Hgb has been stable ~ 7.  He is scheduled for a TIPS procedure tomorrow 03/12       Consultants:    GI : Dr Mendoza ,Dr Richards, Dr. Coleman    PMA: Dr Yin, Dr. Albert    Interval Events:  Stable Hgb around 7-8. Some dark stool, but no more N/V.   The patient is planned to have TIPS on 03/12.     Review of Systems   Constitutional: Negative for chills and fever.   HENT: Negative for congestion and sore throat.    Respiratory: Negative for shortness of breath and wheezing.    Cardiovascular: Negative for chest pain, claudication and leg swelling.   Gastrointestinal: Positive for blood in stool. Negative for abdominal pain and vomiting.   Genitourinary: Negative for frequency, hematuria and urgency.   Musculoskeletal: Negative for joint pain and neck pain.   Neurological: Negative for seizures and headaches.   Psychiatric/Behavioral: The patient does not have  "insomnia.        PHYSICAL EXAM  Vitals:    03/12/18 0700 03/12/18 0800 03/12/18 0900 03/12/18 1000   BP:       Pulse:       Resp: 17 (!) 21 15 (!) 22   Temp:  36.5 °C (97.7 °F)     SpO2:  96%  96%   Weight:       Height:         Body mass index is 29.52 kg/m².  /69   Pulse 84   Temp 36.5 °C (97.7 °F)   Resp (!) 22   Ht 1.803 m (5' 11\")   Wt 96 kg (211 lb 10.3 oz)   SpO2 96%   BMI 29.52 kg/m²   O2 therapy: Pulse Oximetry: 96 %, O2 Delivery: None (Room Air)    Physical Exam   Constitutional: He is oriented to person, place, and time and well-developed, well-nourished, and in no distress.   HENT:   Head: Normocephalic and atraumatic.   Right Ear: External ear normal.   Left Ear: External ear normal.   Eyes: Pupils are equal, round, and reactive to light.   pale   Neck: Normal range of motion. No JVD present.   Cardiovascular: Regular rhythm, normal heart sounds and intact distal pulses.  Exam reveals no gallop and no friction rub.    No murmur heard.  tachycardic   Pulmonary/Chest: Effort normal and breath sounds normal. No respiratory distress. He has no wheezes. He has no rales.   Abdominal: Soft. Bowel sounds are normal. He exhibits no distension. There is no tenderness. There is no rebound.   Musculoskeletal: He exhibits no edema or tenderness.   Neurological: He is alert and oriented to person, place, and time. No cranial nerve deficit.       Respiratory:     Respiration: (!) 22, Pulse Oximetry: 96 %    Chest Tube Drains:    Recent Labs      03/10/18   1137   ISTATTEMP  36.2 C   ISTATFIO2  21   ISTATSPEC  Venous       HemoDynamics:  Pulse: 84, Heart Rate (Monitored): 77 Blood Pressure: 117/69, NIBP: 116/77          Fluids:    Date 03/12/18 0700 - 03/13/18 0659   Shift 1577-1559 5115-6321 9729-3409 24 Hour Total   I  N  T  A  K  E   P.O. 0   0      P.O. 0   0    I.V. 440   440      Octreotide Volume 40   40      IV Volume (NS maintenance ) 400   400    Shift Total 440   440   O  U  T  P  U  T   Urine " 650   650      Void (ml) 650   650    Stool          Number of Times Stooled 1 x   1 x    Shift Total 650   650   NET -210   -210        Intake/Output Summary (Last 24 hours) at 03/10/18 1247  Last data filed at 03/10/18 1220   Gross per 24 hour   Intake          1629.92 ml   Output             1825 ml   Net          -195.08 ml       Weight: 96 kg (211 lb 10.3 oz)  Body mass index is 29.52 kg/m².    Recent Labs      03/10/18   0457  18   0418  18   1346  18   02418   0930   SODIUM  140   --   136   --   137   POTASSIUM  4.1   --   3.7   --   3.7   CHLORIDE  112   --   110   --   110   CO2  23   --   21   --   24   BUN  22   --   11   --   8   CREATININE  0.80   --   0.76   --   0.70   MAGNESIUM  1.6  1.9   --   1.9   --    PHOSPHORUS  4.1   --    --    --    --    CALCIUM  7.9*   --   7.4*   --   7.5*       GI/Nutrition:  Recent Labs      18   1600   03/10/18   0457  18   1346  18   0930   ALTSGPT  20   --   18  21  21   ASTSGOT  25   --   24  29  26   ALKPHOSPHAT  56   --   43  40  45   TBILIRUBIN  0.7   --   2.1*  1.6*  1.5   LIPASE  21   --    --    --    --    GLUCOSE  187*   < >  122*  168*  125*    < > = values in this interval not displayed.       Heme:  Recent Labs      18   1600   03/10/18   0457   18   1346  18   0247  18   0930   RBC  1.52*   < >  2.12*   < >  2.78*  2.62*  2.58*  2.66*   HEMOGLOBIN  3.5*   < >  5.6*   < >  8.0*  7.6*  7.3*  7.7*   HEMATOCRIT  11.8*   < >  17.1*   < >  23.8*  22.6*  22.4*  23.1*   PLATELETCT  332   < >  164   < >  113*  101*  97*  103*   PROTHROMBTM  18.2*   --   17.2*   --   16.7*   --   16.7*   --    APTT  32.7   --   32.2   --    --    --    --    --    INR  1.54*   --   1.44*   --   1.38*   --   1.38*   --     < > = values in this interval not displayed.       Infectious Disease:  Temp  Av.7 °C (98.1 °F)  Min: 36.5 °C (97.7 °F)  Max: 36.9 °C (98.4 °F)  Recent Labs       03/10/18   0457   03/11/18   1346  03/11/18 2010 03/12/18   0247  03/12/18   0930   WBC  12.7*   < >  8.9  8.0  8.0  6.5   NEUTSPOLYS  57.70   < >  54.10  54.50  51.70  60.40   LYMPHOCYTES  35.60   < >  36.60  35.90  38.10  31.10   MONOCYTES  5.10   < >  5.20  5.60  5.30  4.30   EOSINOPHILS  0.60   < >  2.80  2.90  3.60  3.20   BASOPHILS  0.40   < >  0.60  0.70  0.90  0.50   ASTSGOT  24   --   29   --    --   26   ALTSGPT  18   --   21   --    --   21   ALKPHOSPHAT  43   --   40   --    --   45   TBILIRUBIN  2.1*   --   1.6*   --    --   1.5    < > = values in this interval not displayed.       Meds:  • ondansetron  4 mg     • fentaNYL  25 mcg     • thiamine  100 mg      And   • folic acid  1 mg      And   • multivitamin  1 Tab     • cefTRIAXone (ROCEPHIN) IV  2 g     • NS   1,000 mL (03/12/18 1235)   • insulin regular  1-6 Units      And   • glucose 4 g  16 g      And   • dextrose 50%  25 mL     • pantoprazole  40 mg     • Octreotide infusion  50 mcg/hr 50 mcg/hr (03/11/18 0800)   • nicotine  14 mg      And   • nicotine polacrilex  2 mg          Procedures:  None     Imaging:  ECHOCARDIOGRAM COMP W/O CONT   Final Result      CT-ABDOMEN-PELVIS WITH   Final Result      1.  Gastroesophageal and splenorenal varices with recanalization of the umbilical vein. There is also prominent splenomegaly consistent with portal hypertension. There is small amount of ascites.      2.  Fatty liver.      3.  Enlarged periportal and portacaval lymph nodes.      4.  No evidence of bowel obstruction.      DX-CHEST-PORTABLE (1 VIEW)   Final Result      1.  No acute cardiopulmonary disease.   2.  Supportive tubing as described above.      IR-TIPS    (Results Pending)   IR-CONSULT AND TREAT    (Results Pending)       Problem and Plan:      * Esophageal varices with bleeding (CMS-HCC)- (present on admission)   Assessment & Plan     Hx of Alcoholic liver disease and possible fatty liver.   S/p esophageal variceal  Banding in 10/2017.  Per  patient he stoppped drinking alcohol after diagnosis and procedure in October.  Presented to the ED with hx of hemtemesis, light headedness and Hgb of ~3  On octreotide , PPI, antibiotics, and EV ligation repeated on March 10  S/p EGD 03/10 : large esophageal varices s/p banding of esophageal varices x 5 and gastric varices.  Echo of heart grossly normal. TIPS on 03/12  Transfuse if Hgb <7                           Alcoholic cirrhosis (CMS-HCC)- (present on admission)   Assessment & Plan    Significant drinking history ~ 8years.  Patient states he quit in October 2017 after a diagnosis of liver cirrhosis and esophageal variceal banding .   Ultrasound 10/2017 showed enlarged liver, fatty infiltration and/or cirrhotic change  EGD 10/2017 showed Esophageal Varices, Gastrica varices and portal-hypertensive gastropathy .  EGD 03/2018 EV GV, s/p EV ligation.   No evidence of hepatic encephalopathy.  Planned TIPS, keep close monitor on encephalopathy after TIPS.                     Peripheral neuropathy (CMS-HCC)   Assessment & Plan    Likely due to hx of alcohol , DM  Improved and stable.         Diabetes mellitus (CMS-HCC)- (present on admission)   Assessment & Plan    Type 2, diagnosed in 10/2017, on metformin.   With neuropathy, likely due to alcohol use.  Hgb A1C:   08/30/17: 8.4, 03/2018: 5.6 (not correct due to recent bleeding and transfusion)  Hypoglycemic protocol and very low dose Sliding if needed.               DISPO: ICU    CODE STATUS: full    Quality Measures:  Larson Catheter: None   DVT Prophylaxis: None  Ulcer Prophylaxis: On PPI bid for GI bleed   Antibiotics: Ceftriaxone (03/09-->)  Lines: RIJ, PIV

## 2018-03-12 NOTE — CARE PLAN
Problem: Communication  Goal: The ability to communicate needs accurately and effectively will improve  Outcome: PROGRESSING AS EXPECTED  Patient remains alert and oriented, rings appropriately for assistance    Problem: Pain Management  Goal: Pain level will decrease to patient's comfort goal  Outcome: PROGRESSING AS EXPECTED  Pain control adequate at this time, PRN medications ordered if needed

## 2018-03-12 NOTE — DISCHARGE PLANNING
Medical SW    Referral: Sw spoke to bedside RN. Family and pt need support w/ work excuses and time off.    Sw advised if family is referring to FMLA time off, they can acquire HR paperwork from HR at work then bring to hospital. This Sw can provide to hospitalist AA for completion.    Plan: Sw to assist w/ d/c planning as needed.

## 2018-03-12 NOTE — PROGRESS NOTES
Pulmonary Critical Care Progress Note        Admit 3/9/2018    Chief Complaint: Hematemesis    History of Present Illness:   This is a 27-year-old male who is a    from the [place] deploymented, presents with history of lightheadedness,   dizziness, hematemesis, and melena.  The patient states that he has known   history of esophageal varices as well as with early cirrhosis and has had been   drinking heavily for the last 10 years, but quit approximately 4 months ago.    The patient at that point had esophageal varices and was banded.  The patient   states that he has had ongoing bleeding.  The patient has had no significant   chest pain or shortness of breath associated with this.  He states that he was   near syncopal.  His heart rate has been elevated as well.  The patient's wife   did help him with getting him off the floor after he was very weak and   lightheaded.  Once ambulance arrived, IV was established and the patient was   transferred for higher level of care.  The patient has at this time no   routine.    Review of Systems   Constitutional: Negative for chills, diaphoresis, fever and malaise/fatigue.   HENT: Negative for congestion, nosebleeds and sore throat.    Eyes: Negative.    Respiratory: Negative for cough, hemoptysis, sputum production and shortness of breath.    Cardiovascular: Negative for chest pain and palpitations.   Gastrointestinal: Positive for blood in stool and melena. Negative for abdominal pain (improved), diarrhea, heartburn, nausea and vomiting.        Improving signs of recent bleed, no new hematemesis   Genitourinary: Negative.    Musculoskeletal: Negative.    Skin: Negative.    Neurological: Negative for sensory change, speech change, focal weakness, weakness and headaches.   Endo/Heme/Allergies: Negative.    Psychiatric/Behavioral: The patient has insomnia. The patient is not nervous/anxious.        Interval Events:  24 hour interval history reviewed     Black  stools  A&O x4  No pain  SR 80-90s  Bp 90-100s  Afeb  TIPs today  CT and ECHO ok  R IJ CVC  Room air  Ambulatory  PPI IV Bolus  Octreotide  K 3.7  Last Hgb 7.7  Ceftriaxone    Serial follow-up  TIPs delayed by IR - Procedue in AM?    YESTERDAY   - 2 stools without blood   - Neuro: AOx4   - HR: 70s-110s   - BP: 90s-110s systolic   - GI: stools as above, NPO p MN for TIPS tomorrow   - UOP: 1180 mL/24   - Larson: no   - Tm: 36.8   - Lines: RIJ CVC   - PPx: GI ppi gtt, DVT SCDs   - TIPS tomorrow    PFSH:  No change.    Physical Exam   Constitutional: He appears well-developed and well-nourished. He is cooperative.  Non-toxic appearance. He does not appear ill. No distress.   HENT:   Head: Normocephalic and atraumatic.   Eyes: Pupils are equal, round, and reactive to light. Scleral icterus is present.   Neck: Phonation normal. No JVD present.   Cardiovascular: Normal rate, regular rhythm, normal heart sounds and intact distal pulses.  Exam reveals no gallop and no friction rub.    No murmur heard.  Pulmonary/Chest: Effort normal. No respiratory distress. He has no wheezes. He has no rales.   Abdominal: Soft. Bowel sounds are normal. He exhibits ascites. He exhibits no distension, no fluid wave and no mass. There is no hepatosplenomegaly. There is no tenderness. There is no rebound, no guarding and negative Ferrell's sign.   Neurological: He is alert. No cranial nerve deficit. He exhibits normal muscle tone. Coordination normal.   Skin: Skin is warm and dry. Capillary refill takes less than 2 seconds. He is not diaphoretic. No erythema.   Psychiatric: He has a normal mood and affect. His behavior is normal. Thought content normal.   Nursing note and vitals reviewed.      Respiratory:     Pulse Oximetry: 96 %        Room air    ImagingCXR  I have personally reviewed the chest x-ray my impression is  noted above and films are reviewed with Team on rounds  Recent Labs      03/10/18   1137   ISTATTEMP  36.2 C   ISTATFIO2  21    ISTATSPEC  Venous        HemoDynamics:  Pulse: 84, Heart Rate (Monitored): 73  Blood Pressure: 117/69, NIBP: (!) 99/61       Imaging: Available data reviewed  Recent Labs      03/09/18   1600   TROPONINI  <0.01       Neuro:  GCS Total Maria Victoria Coma Score: 15     Imaging: Available data reviewed    Fluids:  Intake/Output       03/10/18 0700 - 03/11/18 0659 03/11/18 0700 - 03/12/18 0659 03/12/18 0700 - 03/13/18 0659      2148-7540 0744-5903 Total 9405-9202 9697-5121 Total 1818-4788 3362-8341 Total       Intake    P.O.  0  -- 0  1230  -- 1230  --  -- --    P.O. 0 -- 0 1230 -- 1230 -- -- --    I.V.  2452.2  1824 4276.2  1456  500 1956  --  -- --    Crystalloid Intake 1000 -- 1000 -- -- -- -- -- --    Octreotide Volume 252.2 120 372.2 120 100 220 -- -- --    IV Volume (NS maintenance ) 1200 1200 2400 2426 326 6060 -- -- --    IV Volume (Rally bag) -- 504 504 336 -- 336 -- -- --    Blood  1651.3  -- 1651.3  365  -- 365  --  -- --    PRBC Total Volume (Non-Barcoded) 700 -- 700 -- -- -- -- -- --    Volume (RELEASE RED BLOOD CELLS) 378.8 -- 378.8 -- -- -- -- -- --    Volume (RELEASE RED BLOOD CELLS) 425 -- 425 -- -- -- -- -- --    Volume (RELEASE FRESH FROZEN PLASMA) 50 -- 50 -- -- -- -- -- --    Volume (RELEASE FRESH FROZEN PLASMA) 30 -- 30 -- -- -- -- -- --    Volume (RELEASE FRESH FROZEN PLASMA) 30 -- 30 -- -- -- -- -- --    Volume (RELEASE RED BLOOD CELLS-ACTIVE BLEEDING) 37.5 -- 37.5 -- -- -- -- -- --    Volume (RELEASE RED BLOOD CELLS) -- -- -- 365 -- 365 -- -- --    Total Intake 4103.4 1824 5927.4 3051 500 3551 -- -- --       Output    Urine  480  1 481  1115  -- 1115  --  -- --    Number of Times Voided -- 2 x 2 x -- 2 x 2 x -- -- --    Void (ml) 480 1 481 1115 -- 1115 -- -- --    Emesis  1000  -- 1000  --  -- --  --  -- --    Emesis 1000 -- 1000 -- -- -- -- -- --    Emesis - Number of Times 2 x -- 2 x -- -- -- -- -- --    Stool  --  -- --  --  -- --  --  -- --    Number of Times Stooled 2 x 2 x 4 x 1 x 1 x 2 x --  -- --    Blood  1000  -- 1000  --  -- --  --  -- --    Est. Blood Loss (mL) 1000 -- 1000 -- -- -- -- -- --    Total Output 2480 1 2481 1115 -- 1115 -- -- --       Net I/O     1623.4 1823 3446.4 9660 727 1996 -- -- --        Weight: 96 kg (211 lb 10.3 oz)  Recent Labs      03/09/18   2345  03/10/18   0457  03/11/18   0418  03/11/18   1346 03/12/18   0247   SODIUM  139  140   --   136   --    POTASSIUM  4.1  4.1   --   3.7   --    CHLORIDE  110  112   --   110   --    CO2  22  23   --   21   --    BUN  22  22   --   11   --    CREATININE  0.79  0.80   --   0.76   --    MAGNESIUM   --   1.6  1.9   --   1.9   PHOSPHORUS   --   4.1   --    --    --    CALCIUM  7.9*  7.9*   --   7.4*   --        GI/Nutrition:  Imaging: Available data reviewed   U/s ABD 10/11/2018:  1.  Enlarged liver with increased echogenicity which represent fatty infiltration and/or cirrhotic change. No mass or biliary dilatation.  2.  No cholelithiasis. Contracted gallbladder.  3.  Normal appearance of the pancreas.  4.  Normal hepatopedal flow in the portal vein. No portal vein enlargement.  5.  No ascites.  6.  Splenomegaly. Small accessory spleen is present.    NPO     Liver Function  Recent Labs      03/09/18   1600  03/09/18   2345  03/10/18   0457  03/11/18   1346   ALTSGPT  20   --   18  21   ASTSGOT  25   --   24  29   ALKPHOSPHAT  56   --   43  40   TBILIRUBIN  0.7   --   2.1*  1.6*   LIPASE  21   --    --    --    GLUCOSE  187*  138*  122*  168*       Heme:  Recent Labs      03/09/18   1600   03/10/18   0457   03/11/18   1346  03/11/18 2010 03/12/18   0247   RBC  1.52*   < >  2.12*   < >  2.78*  2.62*  2.58*   HEMOGLOBIN  3.5*   < >  5.6*   < >  8.0*  7.6*  7.3*   HEMATOCRIT  11.8*   < >  17.1*   < >  23.8*  22.6*  22.4*   PLATELETCT  332   < >  164   < >  113*  101*  97*   PROTHROMBTM  18.2*   --   17.2*   --   16.7*   --   16.7*   APTT  32.7   --   32.2   --    --    --    --    INR  1.54*   --   1.44*   --   1.38*   --   1.38*    < >  = values in this interval not displayed.       Infectious Disease:  Temp  Av.7 °C (98.1 °F)  Min: 36.5 °C (97.7 °F)  Max: 36.9 °C (98.4 °F)  Micro: reviewed  Recent Labs      18   1600   03/10/18   0457   18   1346  18   0247   WBC  22.8*   < >  12.7*   < >  8.9  8.0  8.0   NEUTSPOLYS  60.80   < >  57.70   < >  54.10  54.50  51.70   LYMPHOCYTES  31.50   < >  35.60   < >  36.60  35.90  38.10   MONOCYTES  5.50   < >  5.10   < >  5.20  5.60  5.30   EOSINOPHILS  1.00   < >  0.60   < >  2.80  2.90  3.60   BASOPHILS  0.10   < >  0.40   < >  0.60  0.70  0.90   ASTSGOT  25   --   24   --   29   --    --    ALTSGPT  20   --   18   --   21   --    --    ALKPHOSPHAT  56   --   43   --   40   --    --    TBILIRUBIN  0.7   --   2.1*   --   1.6*   --    --     < > = values in this interval not displayed.     Current Facility-Administered Medications   Medication Dose Frequency Provider Last Rate Last Dose   • ondansetron (ZOFRAN) syringe/vial injection 4 mg  4 mg Q4HRS PRN Jim Albert M.D.   4 mg at 03/10/18 1103   • fentaNYL (SUBLIMAZE) injection 25 mcg  25 mcg Q HOUR PRN Jim Albert M.D.       • thiamine (THIAMINE) tablet 100 mg  100 mg DAILY Jim Albert M.D.   100 mg at 18 0834    And   • folic acid (FOLVITE) tablet 1 mg  1 mg DAILY Jim Albert M.D.   1 mg at 18 1555    And   • multivitamin (THERAGRAN) tablet 1 Tab  1 Tab DAILY Jim Albert M.D.   1 Tab at 18 1555   • cefTRIAXone (ROCEPHIN) syringe 2 g  2 g Q24HRS Lance Melo M.D.   2 g at 18   • NS infusion   Continuous Lance Melo M.D. 100 mL/hr at 18 1726     • insulin regular (HUMULIN R) injection 1-6 Units  1-6 Units TID Lance Melo M.D.   1 Units at 18    And   • glucose 4 g chewable tablet 16 g  16 g Q15 MIN PRN Lance Melo M.D.        And   • dextrose 50% (D50W) injection 25 mL  25 mL Q15 MIN PRN Lance Melo M.D.       • pantoprazole  (PROTONIX) injection 40 mg  40 mg BID Lance Melo M.D.   40 mg at 03/11/18 2002   • octreotide (SANDOSTATIN) 1,250 mcg in  mL Infusion  50 mcg/hr Continuous Lance Melo M.D. 10 mL/hr at 03/11/18 0800 50 mcg/hr at 03/11/18 0800   • nicotine (NICODERM) 14 MG/24HR 14 mg  14 mg Daily-0600 Lance Melo M.D.   14 mg at 03/11/18 0600    And   • nicotine polacrilex (NICORETTE) 2 MG piece 2 mg  2 mg Q HOUR PRN Lance Melo M.D.         Last reviewed on 3/9/2018  9:13 PM by Carmen Pham R.N.    Quality  Measures:  Core Measures & Quality Metrics    Problems/plan:  Hemorrhagic shock -> Severe blood loss anemia/hypotension   - NPO - start by mouth post-TIPS if okay with GI   - Continue fluid and blood product resuscitation as needed   - Serial CBC and TEG/mapping     Massive upper gastrointestinal bleed -r esolved since banding   -  from esophageal varices in the setting of portal hypertension   - H/o prior GI bleed - variceal   - s/p variceal banding   - TIPS planned for 3/12, delayed by IR to 3/13   - ECHO noted/CT abdomen noted - both okay for TIPS   - continue Octreotide infusion   - continue Protonix infusion - post-TIPS convert to by mouth    Alcohol abuse    - The patient currently reportedly sober, reports last drink months ago   - H/o cirrhosis secondary to ETOH   - Monitor for withdrawal - currently no signs   - Thiamine/MVI/Folate - IV ×3 days    Microcytic anemia.   - When acute process over assess iron stores and consider iron therapy    Metabolic acidosis with predominant lactic acidosis - improved   - Probably secondary to anemia   - Transfuse as needed   - Nothing to suggest sepsis   - Empiric treatment for SBP prophylaxis with Ceftriaxone    Coagulopathy, likely related to acute on chronic liver disease.   - TEG normal   - Further blood products as needed     TOB use   - Cessation encouraged again   - Nicotine patch    Diabetes Mellitus Type II   - H/o neuropathy   - Hold  Metformin/ACE/ARB   - SSI, accuchecks    Prophylaxis: SCDs, protonix     Prognosis remains guarded. TIPS planned for today, will maintain hemodynamics overnight.  High risk of deterioration and worsening vital organ dysfunction and death without the above critical care interventions.     Discussed patient condition and risk of morbidity and/or mortality with RN, RT, Pharmacy, Dietary, , [employer] [facility] resident, Charge nurse / hot rounds, Patient, GI and Interventional Radiology.

## 2018-03-12 NOTE — PROGRESS NOTES
The interventional radiology procedure TIPS has been scheduled for Thursday 3/15/18 at 1300 with general anesthesia.  Please update patient and family to plan of care.  Notified Bev bedside RN of pre procedure needs, NPO, Consent for Radiology Procedure, IV, current labs to include PT/INR, anti-coagulants held.  Any questions please call [phone].

## 2018-03-12 NOTE — PROGRESS NOTES
Saw Veena (IR)'s note, talked to IR team, for now, Thur afternoon is what IR team scheduled.   The patient's TIPS can also be done as a outpatient on the same time on Thur as long as his insurance is willing to pay.     No procedure today, the patient can resume liquid diet.

## 2018-03-13 LAB
ALBUMIN SERPL BCP-MCNC: 2.7 G/DL (ref 3.2–4.9)
ALBUMIN/GLOB SERPL: 1.2 G/DL
ALP SERPL-CCNC: 50 U/L (ref 30–99)
ALT SERPL-CCNC: 18 U/L (ref 2–50)
ANION GAP SERPL CALC-SCNC: 5 MMOL/L (ref 0–11.9)
AST SERPL-CCNC: 24 U/L (ref 12–45)
BASOPHILS # BLD AUTO: 0.6 % (ref 0–1.8)
BASOPHILS # BLD AUTO: 0.7 % (ref 0–1.8)
BASOPHILS # BLD AUTO: 0.8 % (ref 0–1.8)
BASOPHILS # BLD: 0.04 K/UL (ref 0–0.12)
BASOPHILS # BLD: 0.04 K/UL (ref 0–0.12)
BASOPHILS # BLD: 0.05 K/UL (ref 0–0.12)
BILIRUB SERPL-MCNC: 1.7 MG/DL (ref 0.1–1.5)
BUN SERPL-MCNC: 6 MG/DL (ref 8–22)
CALCIUM SERPL-MCNC: 7.7 MG/DL (ref 8.5–10.5)
CHLORIDE SERPL-SCNC: 109 MMOL/L (ref 96–112)
CO2 SERPL-SCNC: 24 MMOL/L (ref 20–33)
CREAT SERPL-MCNC: 0.73 MG/DL (ref 0.5–1.4)
EOSINOPHIL # BLD AUTO: 0.11 K/UL (ref 0–0.51)
EOSINOPHIL # BLD AUTO: 0.18 K/UL (ref 0–0.51)
EOSINOPHIL # BLD AUTO: 0.26 K/UL (ref 0–0.51)
EOSINOPHIL NFR BLD: 1.7 % (ref 0–6.9)
EOSINOPHIL NFR BLD: 3.1 % (ref 0–6.9)
EOSINOPHIL NFR BLD: 3.9 % (ref 0–6.9)
ERYTHROCYTE [DISTWIDTH] IN BLOOD BY AUTOMATED COUNT: 50.6 FL (ref 35.9–50)
ERYTHROCYTE [DISTWIDTH] IN BLOOD BY AUTOMATED COUNT: 52.9 FL (ref 35.9–50)
ERYTHROCYTE [DISTWIDTH] IN BLOOD BY AUTOMATED COUNT: 53.5 FL (ref 35.9–50)
GLOBULIN SER CALC-MCNC: 2.3 G/DL (ref 1.9–3.5)
GLUCOSE BLD-MCNC: 101 MG/DL (ref 65–99)
GLUCOSE BLD-MCNC: 107 MG/DL (ref 65–99)
GLUCOSE BLD-MCNC: 114 MG/DL (ref 65–99)
GLUCOSE BLD-MCNC: 132 MG/DL (ref 65–99)
GLUCOSE SERPL-MCNC: 104 MG/DL (ref 65–99)
HCT VFR BLD AUTO: 22.3 % (ref 42–52)
HCT VFR BLD AUTO: 22.4 % (ref 42–52)
HCT VFR BLD AUTO: 25.7 % (ref 42–52)
HGB BLD-MCNC: 7.2 G/DL (ref 14–18)
HGB BLD-MCNC: 7.5 G/DL (ref 14–18)
HGB BLD-MCNC: 8.1 G/DL (ref 14–18)
IMM GRANULOCYTES # BLD AUTO: 0.01 K/UL (ref 0–0.11)
IMM GRANULOCYTES # BLD AUTO: 0.02 K/UL (ref 0–0.11)
IMM GRANULOCYTES # BLD AUTO: 0.02 K/UL (ref 0–0.11)
IMM GRANULOCYTES NFR BLD AUTO: 0.2 % (ref 0–0.9)
IMM GRANULOCYTES NFR BLD AUTO: 0.3 % (ref 0–0.9)
IMM GRANULOCYTES NFR BLD AUTO: 0.3 % (ref 0–0.9)
INR PPP: 1.51 (ref 0.87–1.13)
LYMPHOCYTES # BLD AUTO: 1.48 K/UL (ref 1–4.8)
LYMPHOCYTES # BLD AUTO: 1.92 K/UL (ref 1–4.8)
LYMPHOCYTES # BLD AUTO: 2.05 K/UL (ref 1–4.8)
LYMPHOCYTES NFR BLD: 22.7 % (ref 22–41)
LYMPHOCYTES NFR BLD: 31 % (ref 22–41)
LYMPHOCYTES NFR BLD: 33.2 % (ref 22–41)
MAGNESIUM SERPL-MCNC: 1.6 MG/DL (ref 1.5–2.5)
MCH RBC QN AUTO: 28.1 PG (ref 27–33)
MCH RBC QN AUTO: 28.5 PG (ref 27–33)
MCH RBC QN AUTO: 29.4 PG (ref 27–33)
MCHC RBC AUTO-ENTMCNC: 31.5 G/DL (ref 33.7–35.3)
MCHC RBC AUTO-ENTMCNC: 32.1 G/DL (ref 33.7–35.3)
MCHC RBC AUTO-ENTMCNC: 33.6 G/DL (ref 33.7–35.3)
MCV RBC AUTO: 87.5 FL (ref 81.4–97.8)
MCV RBC AUTO: 88.5 FL (ref 81.4–97.8)
MCV RBC AUTO: 89.2 FL (ref 81.4–97.8)
MONOCYTES # BLD AUTO: 0.32 K/UL (ref 0–0.85)
MONOCYTES # BLD AUTO: 0.34 K/UL (ref 0–0.85)
MONOCYTES # BLD AUTO: 0.45 K/UL (ref 0–0.85)
MONOCYTES NFR BLD AUTO: 5.2 % (ref 0–13.4)
MONOCYTES NFR BLD AUTO: 5.5 % (ref 0–13.4)
MONOCYTES NFR BLD AUTO: 6.8 % (ref 0–13.4)
NEUTROPHILS # BLD AUTO: 3.32 K/UL (ref 1.82–7.42)
NEUTROPHILS # BLD AUTO: 3.78 K/UL (ref 1.82–7.42)
NEUTROPHILS # BLD AUTO: 4.54 K/UL (ref 1.82–7.42)
NEUTROPHILS NFR BLD: 57.2 % (ref 44–72)
NEUTROPHILS NFR BLD: 57.3 % (ref 44–72)
NEUTROPHILS NFR BLD: 69.5 % (ref 44–72)
NRBC # BLD AUTO: 0 K/UL
NRBC BLD-RTO: 0 /100 WBC
PLATELET # BLD AUTO: 113 K/UL (ref 164–446)
PLATELET # BLD AUTO: 91 K/UL (ref 164–446)
PLATELET # BLD AUTO: 93 K/UL (ref 164–446)
PMV BLD AUTO: 11.6 FL (ref 9–12.9)
PMV BLD AUTO: 12.1 FL (ref 9–12.9)
PMV BLD AUTO: 12.2 FL (ref 9–12.9)
POTASSIUM SERPL-SCNC: 3.9 MMOL/L (ref 3.6–5.5)
PROT SERPL-MCNC: 5 G/DL (ref 6–8.2)
PROTHROMBIN TIME: 17.9 SEC (ref 12–14.6)
RBC # BLD AUTO: 2.53 M/UL (ref 4.7–6.1)
RBC # BLD AUTO: 2.55 M/UL (ref 4.7–6.1)
RBC # BLD AUTO: 2.88 M/UL (ref 4.7–6.1)
SODIUM SERPL-SCNC: 138 MMOL/L (ref 135–145)
WBC # BLD AUTO: 5.8 K/UL (ref 4.8–10.8)
WBC # BLD AUTO: 6.5 K/UL (ref 4.8–10.8)
WBC # BLD AUTO: 6.6 K/UL (ref 4.8–10.8)

## 2018-03-13 PROCEDURE — 83735 ASSAY OF MAGNESIUM: CPT

## 2018-03-13 PROCEDURE — 700111 HCHG RX REV CODE 636 W/ 250 OVERRIDE (IP): Performed by: INTERNAL MEDICINE

## 2018-03-13 PROCEDURE — 770022 HCHG ROOM/CARE - ICU (200)

## 2018-03-13 PROCEDURE — 85610 PROTHROMBIN TIME: CPT

## 2018-03-13 PROCEDURE — A9270 NON-COVERED ITEM OR SERVICE: HCPCS | Performed by: INTERNAL MEDICINE

## 2018-03-13 PROCEDURE — 82962 GLUCOSE BLOOD TEST: CPT

## 2018-03-13 PROCEDURE — 80053 COMPREHEN METABOLIC PANEL: CPT

## 2018-03-13 PROCEDURE — C9113 INJ PANTOPRAZOLE SODIUM, VIA: HCPCS | Performed by: INTERNAL MEDICINE

## 2018-03-13 PROCEDURE — 700102 HCHG RX REV CODE 250 W/ 637 OVERRIDE(OP): Performed by: INTERNAL MEDICINE

## 2018-03-13 PROCEDURE — 85025 COMPLETE CBC W/AUTO DIFF WBC: CPT | Mod: 91

## 2018-03-13 RX ORDER — OMEPRAZOLE 20 MG/1
20 CAPSULE, DELAYED RELEASE ORAL 2 TIMES DAILY
Status: DISCONTINUED | OUTPATIENT
Start: 2018-03-13 | End: 2018-03-15 | Stop reason: HOSPADM

## 2018-03-13 RX ORDER — DEXTROSE MONOHYDRATE 25 G/50ML
25 INJECTION, SOLUTION INTRAVENOUS
Status: DISCONTINUED | OUTPATIENT
Start: 2018-03-13 | End: 2018-03-15 | Stop reason: HOSPADM

## 2018-03-13 RX ORDER — MAGNESIUM SULFATE HEPTAHYDRATE 40 MG/ML
2 INJECTION, SOLUTION INTRAVENOUS ONCE
Status: COMPLETED | OUTPATIENT
Start: 2018-03-13 | End: 2018-03-13

## 2018-03-13 RX ADMIN — CEFTRIAXONE 2 G: 2 INJECTION, POWDER, FOR SOLUTION INTRAMUSCULAR; INTRAVENOUS at 20:36

## 2018-03-13 RX ADMIN — THERA TABS 1 TABLET: TAB at 09:16

## 2018-03-13 RX ADMIN — MAGNESIUM SULFATE IN WATER 2 G: 40 INJECTION, SOLUTION INTRAVENOUS at 12:02

## 2018-03-13 RX ADMIN — OMEPRAZOLE 20 MG: 20 CAPSULE, DELAYED RELEASE ORAL at 20:36

## 2018-03-13 RX ADMIN — FOLIC ACID 1 MG: 1 TABLET ORAL at 09:16

## 2018-03-13 RX ADMIN — PANTOPRAZOLE SODIUM 40 MG: 40 INJECTION, POWDER, LYOPHILIZED, FOR SOLUTION INTRAVENOUS at 09:16

## 2018-03-13 RX ADMIN — Medication 100 MG: at 09:16

## 2018-03-13 ASSESSMENT — ENCOUNTER SYMPTOMS
WEAKNESS: 0
HEMOPTYSIS: 0
DIAPHORESIS: 0
HEADACHES: 0
SENSORY CHANGE: 0
HEARTBURN: 0
INSOMNIA: 1
DIARRHEA: 0
BLOOD IN STOOL: 1
SHORTNESS OF BREATH: 0
SPUTUM PRODUCTION: 0
SORE THROAT: 0
VOMITING: 0
SPEECH CHANGE: 0
NERVOUS/ANXIOUS: 0
CLAUDICATION: 0
CHILLS: 0
PALPITATIONS: 0
WHEEZING: 0
MUSCULOSKELETAL NEGATIVE: 1
COUGH: 0
FOCAL WEAKNESS: 0
FEVER: 0
SEIZURES: 0
NECK PAIN: 0
ABDOMINAL PAIN: 0
INSOMNIA: 0
NAUSEA: 0
EYES NEGATIVE: 1

## 2018-03-13 ASSESSMENT — PATIENT HEALTH QUESTIONNAIRE - PHQ9
1. LITTLE INTEREST OR PLEASURE IN DOING THINGS: NOT AT ALL
SUM OF ALL RESPONSES TO PHQ9 QUESTIONS 1 AND 2: 0
SUM OF ALL RESPONSES TO PHQ QUESTIONS 1-9: 0

## 2018-03-13 ASSESSMENT — PAIN SCALES - GENERAL
PAINLEVEL_OUTOF10: 0

## 2018-03-13 ASSESSMENT — LIFESTYLE VARIABLES: DO YOU DRINK ALCOHOL: NO

## 2018-03-13 NOTE — PROGRESS NOTES
Gastroenterology Sign Off Note     Author: Vega AUGUSTIN [place]   Date & Time Created: 3/13/2018 8:49 AM    Interval History:  3/10/2018: Denies further hematemesis or melena overnight.  Mild epigastric dyspepsia.  Has received 5U PRBC, 3U FFP and platelets overnight.  Current Hgb 5.  3/11/2018: EGD yesterday with large EV with stigmata of bleeding s/p band x5 and gastric varices.  Hgb stable since banding.  Denies other issues.  Still some old blood in stools.   3/12/2018: No recurrent bleeding since he underwent EGD/banding.  He is reportedly scheduled for TIPS today.  3/13/2018: TIPS not performed and has been rescheduled to 3/15/2018.  The plan is to keep him here until completed.  He says he feels well - no pain, nausea, or bleeding.     Chief Complaint:  Hematemesis    Review of Systems:  ROS    Physical Exam:  Physical Exam   Constitutional: He is oriented to person, place, and time. No distress.   Cardiovascular: Regular rhythm.    No murmur heard.  Pulmonary/Chest: Breath sounds normal. No respiratory distress. He has no wheezes. He has no rales.   Abdominal: Soft. Bowel sounds are normal. He exhibits no distension and no mass. There is no tenderness.   Neurological: He is alert and oriented to person, place, and time.       Labs:  Recent Labs      03/10/18   1137   ISTATTEMP  36.2 C   ISTATFIO2  21   ISTATSPEC  Venous         Recent Labs      03/11/18   0418  03/11/18   1346  03/12/18   0247  03/12/18   0930  03/13/18   0459   SODIUM   --   136   --   137   --    POTASSIUM   --   3.7   --   3.7   --    CHLORIDE   --   110   --   110   --    CO2   --   21   --   24   --    BUN   --   11   --   8   --    CREATININE   --   0.76   --   0.70   --    MAGNESIUM  1.9   --   1.9   --   1.6   CALCIUM   --   7.4*   --   7.5*   --      Recent Labs      03/11/18   1346  03/12/18   0930   ALTSGPT  21  21   ASTSGOT  29  26   ALKPHOSPHAT  40  45   TBILIRUBIN  1.6*  1.5   GLUCOSE  168*  125*     Recent Labs      03/11/18   1341    18   0247   18   1516  18   2301  18   0302  18   0459   RBC  2.78*   < >  2.58*   < >  2.76*  2.70*  2.53*   --    HEMOGLOBIN  8.0*   < >  7.3*   < >  8.0*  7.9*  7.2*   --    HEMATOCRIT  23.8*   < >  22.4*   < >  24.1*  23.8*  22.4*   --    PLATELETCT  113*   < >  97*   < >  109*  116*  91*   --    PROTHROMBTM  16.7*   --   16.7*   --    --    --    --   17.9*   INR  1.38*   --   1.38*   --    --    --    --   1.51*    < > = values in this interval not displayed.     Recent Labs      18   1346   18   0930  18   1516  18   2301  18   0302   WBC  8.9   < >  6.5  6.8  7.0  6.6   NEUTSPOLYS  54.10   < >  60.40  57.20  57.20  57.20   LYMPHOCYTES  36.60   < >  31.10  34.00  33.90  31.00   MONOCYTES  5.20   < >  4.30  4.70  5.00  6.80   EOSINOPHILS  2.80   < >  3.20  3.40  3.00  3.90   BASOPHILS  0.60   < >  0.50  0.40  0.60  0.80   ASTSGOT  29   --   26   --    --    --    ALTSGPT  21   --   21   --    --    --    ALKPHOSPHAT  40   --   45   --    --    --    TBILIRUBIN  1.6*   --   1.5   --    --    --     < > = values in this interval not displayed.     Hemodynamics:  Temp (24hrs), Av.5 °C (97.7 °F), Min:36.4 °C (97.5 °F), Max:36.7 °C (98 °F)  Temperature: 36.7 °C (98 °F)  Pulse  Av.4  Min: 69  Max: 134Heart Rate (Monitored): 72  NIBP: (!) 89/69     Respiratory:    Respiration: (!) 21, Pulse Oximetry: 94 %        RUL Breath Sounds: Clear, RML Breath Sounds: Clear, RLL Breath Sounds: Clear, OTTO Breath Sounds: Clear, LLL Breath Sounds: Clear  Fluids:    Intake/Output Summary (Last 24 hours) at 18 0849  Last data filed at 18 0600   Gross per 24 hour   Intake             3365 ml   Output                0 ml   Net             3365 ml     Weight: 95.8 kg (211 lb 3.2 oz)  GI/Nutrition:  Orders Placed This Encounter   Procedures   • DIET ORDER     Standing Status:   Standing     Number of Occurrences:   1     Order Specific Question:   Diet:      Answer:   Clear Liquids - No Red Foods [12]     Medical Decision Making, by Problem:  Active Hospital Problems    Diagnosis   • *Esophageal varices with bleeding (CMS-HCC) [I85.01]   • Alcoholic cirrhosis (CMS-HCC) [K70.30]   • Peripheral neuropathy (CMS-HCC) [G62.9]   • Diabetes mellitus (CMS-HCC) [E11.9]     PROBLEMS:  1. Hematemesis.  Secondary to esophageal variceal bleed but also with gastric varices as well.  He is s/p banding 10/2017 and this admission.  Stable now and awaiting TIPS on 3/15/2018.  2. Acute blood loss anemia, severe.  Required 10 units PRBCs.  Now stable post-banding  3. Cirrhosis, secondary to alcohol versus [place].  No prior liver biopsy.  Stopped alcohol 10/2017  4. Pancytopenia secondary to cirrhosis  5. Coagulopathy secondary to cirrhosis  6. Diabetes mellitus  7. Hyperlipidemia  8. Prior neuropathy secondary to alcohol, improved recently    Plan:  I agree with the plan.  At this time I have no additions to care and will sign off.  He can follow-up in our office with his primary gastroenterologist.  Please call for any issues for which I can be of further assistance.    Quality-Core Measures

## 2018-03-13 NOTE — CARE PLAN
Problem: Communication  Goal: The ability to communicate needs accurately and effectively will improve  Outcome: PROGRESSING AS EXPECTED  Pt. Remains alert and oriented, rings bell for assistance     Problem: Pain Management  Goal: Pain level will decrease to patient's comfort goal  Outcome: PROGRESSING AS EXPECTED  Pain control adequate at this time, PRN meds as needed

## 2018-03-13 NOTE — CARE PLAN
Problem: Communication  Goal: The ability to communicate needs accurately and effectively will improve  Outcome: PROGRESSING AS EXPECTED  Able to communicate needs    Problem: Safety  Goal: Will remain free from injury  Outcome: PROGRESSING AS EXPECTED  Free of Injury

## 2018-03-13 NOTE — PROGRESS NOTES
Pulmonary Critical Care Progress Note        Admit 3/9/2018    Chief Complaint: Hematemesis    History of Present Illness:   This is a 27-year-old male who is a    from the [place] deploymented, presents with history of lightheadedness,   dizziness, hematemesis, and melena.  The patient states that he has known   history of esophageal varices as well as with early cirrhosis and has had been   drinking heavily for the last 10 years, but quit approximately 4 months ago.    The patient at that point had esophageal varices and was banded.  The patient   states that he has had ongoing bleeding.  The patient has had no significant   chest pain or shortness of breath associated with this.  He states that he was   near syncopal.  His heart rate has been elevated as well.  The patient's wife   did help him with getting him off the floor after he was very weak and   lightheaded.  Once ambulance arrived, IV was established and the patient was   transferred for higher level of care.  The patient has at this time no   routine.    Review of Systems   Constitutional: Negative for chills, diaphoresis, fever and malaise/fatigue.   HENT: Negative for congestion, nosebleeds and sore throat.    Eyes: Negative.    Respiratory: Negative for cough, hemoptysis, sputum production and shortness of breath.    Cardiovascular: Negative for chest pain and palpitations.   Gastrointestinal: Positive for blood in stool and melena. Negative for abdominal pain (improved), diarrhea, heartburn, nausea and vomiting.        Improving signs of recent bleed, no new hematemesis   Genitourinary: Negative.    Musculoskeletal: Negative.    Skin: Negative.    Neurological: Negative for sensory change, speech change, focal weakness, weakness and headaches.   Endo/Heme/Allergies: Negative.    Psychiatric/Behavioral: The patient has insomnia. The patient is not nervous/anxious.        Interval Events:  24 hour interval history reviewed     A&O x4  IVF x  100cc/hr  PPI bolus ongoing no further nausea/vomiting or clinical signs of GI bleeding  Ceftriaxone ongoing for prophylaxis  Mg 1.6  TIPs for today - delayed yesterday by IR til thurs  Hgb no change overnight  Tm 98.0  Octreotide infusion ongoing for variceal bleeding  Starting to ambulate  Reviewed alcohol cessation plans at length with patient at bedside  Discussed etiology of cirrhosis patient including EtOH and CARLTON etiologies     YESTERDAY  Black stools  A&O x4  No pain  SR 80-90s  Bp 90-100s  Afeb  TIPs today  CT and ECHO ok  R IJ CVC  Room air  Ambulatory  PPI IV Bolus  Octreotide  K 3.7  Last Hgb 7.7  Ceftriaxone    Serial follow-up  TIPs delayed by IR - Procedue in AM?    PFSH:  No change.    Physical Exam   Constitutional: He appears well-developed and well-nourished. He is cooperative.  Non-toxic appearance. He does not appear ill. No distress.   HENT:   Head: Normocephalic and atraumatic.   Eyes: Pupils are equal, round, and reactive to light. Scleral icterus is present.   Neck: Phonation normal. No JVD present.   Cardiovascular: Normal rate, regular rhythm, normal heart sounds and intact distal pulses.  Exam reveals no gallop and no friction rub.    No murmur heard.  Pulmonary/Chest: Effort normal. No respiratory distress. He has no wheezes. He has no rales.   Abdominal: Soft. Bowel sounds are normal. He exhibits ascites. He exhibits no distension, no fluid wave and no mass. There is no hepatosplenomegaly. There is no tenderness. There is no rebound, no guarding and negative Ferrell's sign.   Neurological: He is alert. No cranial nerve deficit. He exhibits normal muscle tone. Coordination normal.   Skin: Skin is warm and dry. Capillary refill takes less than 2 seconds. He is not diaphoretic. No erythema.   Psychiatric: He has a normal mood and affect. His behavior is normal. Thought content normal.   Nursing note and vitals reviewed.  No change     Respiratory:     Pulse Oximetry: 95 %        Room  air    ImagingCXR  I have personally reviewed the chest x-ray my impression is  noted above and films are reviewed with Team on rounds  Recent Labs      03/10/18   1137   ISTATTEMP  36.2 C   ISTATFIO2  21   ISTATSPEC  Venous        HemoDynamics:  Heart Rate (Monitored): 84  NIBP: (!) 89/69       Imaging: Available data reviewed        Neuro:  GCS Total Penfield Coma Score: 15     Imaging: Available data reviewed    Fluids:  Intake/Output       03/11/18 0700 - 03/12/18 0659 03/12/18 0700 - 03/13/18 0659 03/13/18 0700 - 03/14/18 0659      6117-5203 0392-3545 Total 1137-7189 3465-1108 Total 6206-4035 5448-2406 Total       Intake    P.O.  1230  -- 1230  1000  475 1475  --  -- --    P.O. 1230 -- 1230 5657 825 6951 -- -- --    I.V.  1456  720 2176  1240  400 1640  --  -- --    Octreotide Volume 120 120 240 40 -- 40 -- -- --    IV Volume (NS maintenance ) 8968 195 8362 4728 208 9057 -- -- --    IV Volume (Rally bag) 336 -- 336 -- -- -- -- -- --    Blood  365  -- 365  --  -- --  --  -- --    Volume (RELEASE RED BLOOD CELLS) 365 -- 365 -- -- -- -- -- --    Total Intake 3051 720 3771 2240 875 3115 -- -- --       Output    Urine  1115  -- 1115  650  -- 650  --  -- --    Number of Times Voided -- 2 x 2 x 3 x 2 x 5 x -- -- --    Void (ml) 1115 -- 1115 650 -- 650 -- -- --    Stool  --  -- --  --  -- --  --  -- --    Number of Times Stooled 1 x 1 x 2 x 1 x -- 1 x -- -- --    Total Output 1115 -- 1115 650 -- 650 -- -- --       Net I/O     6377 351 1586 1852 213 0209 -- -- --           Recent Labs      03/10/18   0457  03/11/18   0418  03/11/18   1346  03/12/18   0247  03/12/18   0930   SODIUM  140   --   136   --   137   POTASSIUM  4.1   --   3.7   --   3.7   CHLORIDE  112   --   110   --   110   CO2  23   --   21   --   24   BUN  22   --   11   --   8   CREATININE  0.80   --   0.76   --   0.70   MAGNESIUM  1.6  1.9   --   1.9   --    PHOSPHORUS  4.1   --    --    --    --    CALCIUM  7.9*   --   7.4*   --   7.5*        GI/Nutrition:  Imaging: Available data reviewed   U/s ABD 10/11/2018:  1.  Enlarged liver with increased echogenicity which represent fatty infiltration and/or cirrhotic change. No mass or biliary dilatation.  2.  No cholelithiasis. Contracted gallbladder.  3.  Normal appearance of the pancreas.  4.  Normal hepatopedal flow in the portal vein. No portal vein enlargement.  5.  No ascites.  6.  Splenomegaly. Small accessory spleen is present.    taking PO     Liver Function  Recent Labs      03/10/18   0457  03/11/18   1346  03/12/18   0930   ALTSGPT  18  21  21   ASTSGOT  24  29  26   ALKPHOSPHAT  43  40  45   TBILIRUBIN  2.1*  1.6*  1.5   GLUCOSE  122*  168*  125*       Heme:  Recent Labs      03/10/18   0457   03/11/18   1346   03/12/18   0247  18   0930  18   1516  18   2301   RBC  2.12*   < >  2.78*   < >  2.58*  2.66*  2.76*  2.70*   HEMOGLOBIN  5.6*   < >  8.0*   < >  7.3*  7.7*  8.0*  7.9*   HEMATOCRIT  17.1*   < >  23.8*   < >  22.4*  23.1*  24.1*  23.8*   PLATELETCT  164   < >  113*   < >  97*  103*  109*  116*   PROTHROMBTM  17.2*   --   16.7*   --   16.7*   --    --    --    APTT  32.2   --    --    --    --    --    --    --    INR  1.44*   --   1.38*   --   1.38*   --    --    --     < > = values in this interval not displayed.       Infectious Disease:  Temp  Av.5 °C (97.7 °F)  Min: 36.4 °C (97.5 °F)  Max: 36.7 °C (98 °F)  Micro: reviewed  Recent Labs      03/10/18   0457   03/11/18   1346   18   0930  18   1516  18   2301   WBC  12.7*   < >  8.9   < >  6.5  6.8  7.0   NEUTSPOLYS  57.70   < >  54.10   < >  60.40  57.20  57.20   LYMPHOCYTES  35.60   < >  36.60   < >  31.10  34.00  33.90   MONOCYTES  5.10   < >  5.20   < >  4.30  4.70  5.00   EOSINOPHILS  0.60   < >  2.80   < >  3.20  3.40  3.00   BASOPHILS  0.40   < >  0.60   < >  0.50  0.40  0.60   ASTSGOT  24   --   29   --   26   --    --    ALTSGPT  18   --   21   --   21   --    --    ALKPHOSPHAT  43    --   40   --   45   --    --    TBILIRUBIN  2.1*   --   1.6*   --   1.5   --    --     < > = values in this interval not displayed.     Current Facility-Administered Medications   Medication Dose Frequency Provider Last Rate Last Dose   • ondansetron (ZOFRAN) syringe/vial injection 4 mg  4 mg Q4HRS PRN Jim Albert M.D.   4 mg at 03/10/18 1103   • fentaNYL (SUBLIMAZE) injection 25 mcg  25 mcg Q HOUR PRN Jim Albert M.D.       • thiamine (THIAMINE) tablet 100 mg  100 mg DAILY Jim Albert M.D.   100 mg at 03/12/18 0822    And   • folic acid (FOLVITE) tablet 1 mg  1 mg DAILY Jim Albert M.D.   1 mg at 03/12/18 0822    And   • multivitamin (THERAGRAN) tablet 1 Tab  1 Tab DAILY Jim Albert M.D.   1 Tab at 03/12/18 0822   • cefTRIAXone (ROCEPHIN) syringe 2 g  2 g Q24HRS Jim Albert M.D.   2 g at 03/12/18 2106   • NS infusion   Continuous Lance Melo M.D. 100 mL/hr at 03/12/18 1235 1,000 mL at 03/12/18 1235   • insulin regular (HUMULIN R) injection 1-6 Units  1-6 Units TID Lance Melo M.D.   Stopped at 03/12/18 2100    And   • glucose 4 g chewable tablet 16 g  16 g Q15 MIN PRN Lance Melo M.D.        And   • dextrose 50% (D50W) injection 25 mL  25 mL Q15 MIN PRN Lance Melo M.D.       • pantoprazole (PROTONIX) injection 40 mg  40 mg BID Lance Melo M.D.   40 mg at 03/12/18 2100   • octreotide (SANDOSTATIN) 1,250 mcg in  mL Infusion  50 mcg/hr Continuous Lance Melo M.D. 10 mL/hr at 03/11/18 0800 50 mcg/hr at 03/11/18 0800   • nicotine (NICODERM) 14 MG/24HR 14 mg  14 mg Daily-0600 Lance Melo M.D.   14 mg at 03/11/18 0600    And   • nicotine polacrilex (NICORETTE) 2 MG piece 2 mg  2 mg Q HOUR PRN Lance Melo M.D.         Last reviewed on 3/9/2018  9:13 PM by Carmen Pham R.N.    Quality  Measures:  Core Measures & Quality Metrics    Problems/plan:  Hemorrhagic shock -> Severe blood loss anemia/hypotension   - NPO - start by mouth post-TIPS if okay  with GI   - Continue fluid and blood product resuscitation as needed   - Serial CBC and TEG/mapping     Massive upper gastrointestinal bleed -r esolved since banding   -  from esophageal varices in the setting of portal hypertension   - H/o prior GI bleed - variceal   - s/p variceal banding   - TIPS planned for 3/12, delayed by IR to 3/13   - ECHO noted/CT abdomen noted - both okay for TIPS   - continue Octreotide infusion   - continue Protonix infusion - post-TIPS convert to by mouth    Alcohol abuse    - The patient currently reportedly sober, reports last drink months ago   - H/o cirrhosis secondary to ETOH   - Monitor for withdrawal - currently no signs   - Thiamine/MVI/Folate - IV ×3 days    Microcytic anemia.   - When acute process over assess iron stores and consider iron therapy    Metabolic acidosis with predominant lactic acidosis - improved   - Probably secondary to anemia   - Transfuse as needed   - Nothing to suggest sepsis   - Empiric treatment for SBP prophylaxis with Ceftriaxone    Coagulopathy, likely related to acute on chronic liver disease.   - TEG normal   - Further blood products as needed     TOB use   - Cessation encouraged again   - Nicotine patch    Diabetes Mellitus Type II   - H/o neuropathy   - Hold Metformin/ACE/ARB   - SSI, accuchecks    Prophylaxis: SCDs, protonix     Prognosis remains guarded.   TIPS rescheduled for Thursday by IR  Working with IR to try to get scheduled for tomorrow  Will discontinue octreotide after 72 hrs infusion after cessation of upper GI bleeding  Advance diet to soft diet  Hep-Lock IV fluids  Convert PPI to PO route   Okay to transfer to telemetry  RCC will sign off when patient transfers out of ICU    Discussed patient condition and risk of morbidity and/or mortality with RN, RT, Pharmacy, Dietary, , [facility] resident, Charge nurse / hot rounds, Patient, GI and Interventional Radiology.

## 2018-03-13 NOTE — DISCHARGE PLANNING
Medical SW    Sw attended AM IDT Rounds.    RN reports, pt procedure re-scheduled for Thursday, GI signed off case, independent, advance diet and possible unit transfer, on room air,    Plan: Sw to assist w/ d/c planning as needed.

## 2018-03-13 NOTE — PROGRESS NOTES
28 yo male   with a hx of esophageal varices and liver cirrhosis 2/2 Alcohol presented to the ED with hematemesis, blood in stool and lightheadedness. Patient had banding of his esophageal varices in 10/2017. Patient states stopped drinking alcohol after procedure in October 2017.  In the ED, he was found to be tachycardic, /46, Hgb was 3.5. Massive transfusion protocol was initiated and he was given 1unit of FFP, platelets  and PRBC at ED. A central line was placed. GI was consulted.     Patient received 10U RBC and 3 Platelets,3 FFP as he was having bloody emesis with clots and bloody stools.   He had an EGD on 03/ 10 which showed large esophageal varices which was banded x5 and gastric varices. Hgb has been stable ~ 7.  Octreotide stopped on 03/13. TIPS scheduled on 03/15    1. Make sure the patient get TIPS on 03/15  2. Close monitor on his Hgb and clinical evidence of bleeding. If re-bleeding noted, resume octreotide and call GI.   3. Keep Hgb ~7.   4. Please confirm GI consultant is okay to see him before discharge (insurance issue).

## 2018-03-14 ENCOUNTER — APPOINTMENT (OUTPATIENT)
Dept: RADIOLOGY | Facility: MEDICAL CENTER | Age: 28
DRG: 405 | End: 2018-03-14
Attending: INTERNAL MEDICINE
Payer: COMMERCIAL

## 2018-03-14 LAB
BACTERIA BLD CULT: NORMAL
BACTERIA BLD CULT: NORMAL
GLUCOSE BLD-MCNC: 100 MG/DL (ref 65–99)
GLUCOSE BLD-MCNC: 106 MG/DL (ref 65–99)
GLUCOSE BLD-MCNC: 90 MG/DL (ref 65–99)
GLUCOSE BLD-MCNC: 91 MG/DL (ref 65–99)
SIGNIFICANT IND 70042: NORMAL
SIGNIFICANT IND 70042: NORMAL
SITE SITE: NORMAL
SITE SITE: NORMAL
SOURCE SOURCE: NORMAL
SOURCE SOURCE: NORMAL

## 2018-03-14 PROCEDURE — 4A043B0 MEASUREMENT OF VENOUS PRESSURE, CENTRAL, PERCUTANEOUS APPROACH: ICD-10-PCS | Performed by: RADIOLOGY

## 2018-03-14 PROCEDURE — 770022 HCHG ROOM/CARE - ICU (200)

## 2018-03-14 PROCEDURE — A9270 NON-COVERED ITEM OR SERVICE: HCPCS | Performed by: INTERNAL MEDICINE

## 2018-03-14 PROCEDURE — B51T1ZZ FLUOROSCOPY OF PORTAL AND SPLANCHNIC VEINS USING LOW OSMOLAR CONTRAST: ICD-10-PCS | Performed by: RADIOLOGY

## 2018-03-14 PROCEDURE — 700102 HCHG RX REV CODE 250 W/ 637 OVERRIDE(OP): Performed by: INTERNAL MEDICINE

## 2018-03-14 PROCEDURE — 4A043B2 MEASUREMENT OF VENOUS PRESSURE, PORTAL, PERCUTANEOUS APPROACH: ICD-10-PCS | Performed by: RADIOLOGY

## 2018-03-14 PROCEDURE — 700111 HCHG RX REV CODE 636 W/ 250 OVERRIDE (IP): Performed by: INTERNAL MEDICINE

## 2018-03-14 PROCEDURE — 700101 HCHG RX REV CODE 250

## 2018-03-14 PROCEDURE — 82962 GLUCOSE BLOOD TEST: CPT | Mod: 91

## 2018-03-14 PROCEDURE — B543ZZA ULTRASONOGRAPHY OF RIGHT JUGULAR VEINS, GUIDANCE: ICD-10-PCS | Performed by: RADIOLOGY

## 2018-03-14 PROCEDURE — 37182 INSERT HEPATIC SHUNT (TIPS): CPT

## 2018-03-14 PROCEDURE — 06183J4 BYPASS PORTAL VEIN TO HEPATIC VEIN WITH SYNTHETIC SUBSTITUTE, PERCUTANEOUS APPROACH: ICD-10-PCS | Performed by: RADIOLOGY

## 2018-03-14 PROCEDURE — 700111 HCHG RX REV CODE 636 W/ 250 OVERRIDE (IP)

## 2018-03-14 PROCEDURE — 700117 HCHG RX CONTRAST REV CODE 255: Performed by: RADIOLOGY

## 2018-03-14 RX ORDER — CEFAZOLIN SODIUM 2 G/100ML
2 INJECTION, SOLUTION INTRAVENOUS ONCE
Status: ACTIVE | OUTPATIENT
Start: 2018-03-14 | End: 2018-03-15

## 2018-03-14 RX ORDER — SODIUM CHLORIDE 9 MG/ML
500 INJECTION, SOLUTION INTRAVENOUS PRN
Status: DISCONTINUED | OUTPATIENT
Start: 2018-03-14 | End: 2018-03-15 | Stop reason: HOSPADM

## 2018-03-14 RX ORDER — LIDOCAINE HYDROCHLORIDE 40 MG/ML
SOLUTION TOPICAL
Status: DISPENSED
Start: 2018-03-14 | End: 2018-03-15

## 2018-03-14 RX ORDER — MIDAZOLAM HYDROCHLORIDE 1 MG/ML
INJECTION INTRAMUSCULAR; INTRAVENOUS
Status: DISPENSED
Start: 2018-03-14 | End: 2018-03-15

## 2018-03-14 RX ORDER — ONDANSETRON 2 MG/ML
4 INJECTION INTRAMUSCULAR; INTRAVENOUS PRN
Status: ACTIVE | OUTPATIENT
Start: 2018-03-14 | End: 2018-03-14

## 2018-03-14 RX ADMIN — OMEPRAZOLE 20 MG: 20 CAPSULE, DELAYED RELEASE ORAL at 19:19

## 2018-03-14 RX ADMIN — FENTANYL CITRATE 25 MCG: 50 INJECTION INTRAMUSCULAR; INTRAVENOUS at 19:20

## 2018-03-14 RX ADMIN — OMEPRAZOLE 20 MG: 20 CAPSULE, DELAYED RELEASE ORAL at 08:28

## 2018-03-14 RX ADMIN — CEFTRIAXONE 2 G: 2 INJECTION, POWDER, FOR SOLUTION INTRAMUSCULAR; INTRAVENOUS at 19:24

## 2018-03-14 RX ADMIN — IOHEXOL 45 ML: 300 INJECTION, SOLUTION INTRAVENOUS at 15:40

## 2018-03-14 RX ADMIN — Medication 100 MG: at 08:28

## 2018-03-14 RX ADMIN — THERA TABS 1 TABLET: TAB at 08:28

## 2018-03-14 RX ADMIN — FENTANYL CITRATE 25 MCG: 50 INJECTION INTRAMUSCULAR; INTRAVENOUS at 17:01

## 2018-03-14 RX ADMIN — FOLIC ACID 1 MG: 1 TABLET ORAL at 08:27

## 2018-03-14 ASSESSMENT — ENCOUNTER SYMPTOMS
SENSORY CHANGE: 0
WEAKNESS: 0
WHEEZING: 0
NECK PAIN: 0
SPUTUM PRODUCTION: 0
HEARTBURN: 0
ABDOMINAL PAIN: 0
NERVOUS/ANXIOUS: 0
INSOMNIA: 1
FOCAL WEAKNESS: 0
HEADACHES: 0
INSOMNIA: 0
CHILLS: 0
PALPITATIONS: 0
CLAUDICATION: 0
VOMITING: 0
SPEECH CHANGE: 0
FEVER: 0
BLOOD IN STOOL: 0
HEMOPTYSIS: 0
NAUSEA: 0
SORE THROAT: 0
SHORTNESS OF BREATH: 0
SEIZURES: 0
DIAPHORESIS: 0
DIARRHEA: 0
MUSCULOSKELETAL NEGATIVE: 1
COUGH: 0
EYES NEGATIVE: 1

## 2018-03-14 ASSESSMENT — PAIN SCALES - GENERAL
PAINLEVEL_OUTOF10: 0
PAINLEVEL_OUTOF10: 1
PAINLEVEL_OUTOF10: 1
PAINLEVEL_OUTOF10: 3
PAINLEVEL_OUTOF10: 0
PAINLEVEL_OUTOF10: 0

## 2018-03-14 ASSESSMENT — LIFESTYLE VARIABLES: DO YOU DRINK ALCOHOL: NO

## 2018-03-14 NOTE — PROGRESS NOTES
[facility] ICU Progress Note      Admit Date: 3/9/2018  ICU Day: 6    Resident(s): PATRICIA Melo  Attending: MARY ANN OLMOS/ Dr. Albert    Date & Time:   3/14/2018   1:18 PM       Patient ID:    Name:             August Samuel   YOB: 1990  Age:                 27 y.o.  male   MRN:               [id number]    HPI:    26 yo male   with a hx of esophageal varices and liver cirrhosis 2/2 Alcohol presented to the ED with hematemesis, blood in stool and lightheadedness. Patient had banding of his esophageal varices in 10/2017. Patient states stopped drinking alcohol after procedure in October 2017.  In the ED, he was found to be tachycardic, /46, Hgb was 3.5. Massive transfusion protocol was initiated and he was given 1unit of FFP, platelets  and PRBC. A central line was placed. GI was consulted.   Patient received so many blood products PRBC, Platelets, FFP before emergent EGD as he was having bloody emesis with clots and bloody stools  He had an EGD on 03/ 10 which showed large esophageal varices which was banded x5 and gastric varices. Hgb has been stable ~ 7.  TIPS scheduled on 03/14      Consultants:    GI : Dr Mendoza ,Dr Richards, Dr. Coleman    PMA: Dr Yin, Dr. Albert    Interval Events:  Stable Hgb around 7-8. Some dark stool, but no more N/V.  Octreotide discontinued on 03/13.   Advanced to soft diet.   Planned to have tips on 03/14    Review of Systems   Constitutional: Negative for chills and fever.   HENT: Negative for congestion and sore throat.    Respiratory: Negative for shortness of breath and wheezing.    Cardiovascular: Negative for chest pain, claudication and leg swelling.   Gastrointestinal: Negative for abdominal pain and vomiting.   Genitourinary: Negative for frequency, hematuria and urgency.   Musculoskeletal: Negative for joint pain and neck pain.   Neurological: Negative for seizures and headaches.   Psychiatric/Behavioral: The patient does not have insomnia.   "      PHYSICAL EXAM  Vitals:    03/13/18 1500 03/13/18 1600 03/13/18 2000 03/14/18 0800   BP:       Pulse:       Resp: (!) 21      Temp:   36.1 °C (97 °F) 36.1 °C (97 °F)   SpO2:  96% 97% 96%   Weight:       Height:         Body mass index is 29.46 kg/m².  /69   Pulse 84   Temp 36.1 °C (97 °F)   Resp (!) 21   Ht 1.803 m (5' 11\")   Wt 95.8 kg (211 lb 3.2 oz)   SpO2 96%   BMI 29.46 kg/m²   O2 therapy: Pulse Oximetry: 96 %, O2 Delivery: None (Room Air)    Physical Exam   Constitutional: He is oriented to person, place, and time and well-developed, well-nourished, and in no distress.   HENT:   Head: Normocephalic and atraumatic.   Right Ear: External ear normal.   Left Ear: External ear normal.   Eyes: Pupils are equal, round, and reactive to light.   Conjunctiva color improved.    Neck: Normal range of motion. No JVD present.   Cardiovascular: Regular rhythm, normal heart sounds and intact distal pulses.  Exam reveals no gallop and no friction rub.    No murmur heard.  Pulmonary/Chest: Effort normal and breath sounds normal. No respiratory distress. He has no wheezes. He has no rales.   Abdominal: Soft. Bowel sounds are normal. He exhibits no distension. There is no tenderness. There is no rebound.   Musculoskeletal: He exhibits no edema or tenderness.   Neurological: He is alert and oriented to person, place, and time. No cranial nerve deficit.       Respiratory:     Respiration: (!) 21, Pulse Oximetry: 96 %    Chest Tube Drains:          Invalid input(s): [id number]    HemoDynamics:  Heart Rate (Monitored): 98 NIBP: 116/68          Fluids:    Date 03/14/18 0700 - 03/15/18 0659   Shift 4261-3256 1764-5216 2778-3942 24 Hour Total   I  N  T  A  K  E   P.O. 0   0      P.O. 0   0    Shift Total 0   0   O  U  T  P  U  T   Shift Total       NET 0   0        Intake/Output Summary (Last 24 hours) at 03/10/18 1247  Last data filed at 03/10/18 1220   Gross per 24 hour   Intake          1629.92 ml   Output       "       1825 ml   Net          -195.08 ml          Body mass index is 29.46 kg/m².    Recent Labs      18   SODIUM  136   --   137   --   138   POTASSIUM  3.7   --   3.7   --   3.9   CHLORIDE  110   --   110   --   109   CO2  21   --   24   --   24   BUN  11   --   8   --   6*   CREATININE  0.76   --   0.70   --   0.73   MAGNESIUM   --   1.9   --   1.6   --    CALCIUM  7.4*   --   7.5*   --   7.7*       GI/Nutrition:  Recent Labs      18   ALTSGPT    18   ASTSGOT  29  26  24   ALKPHOSPHAT  40  45  50   TBILIRUBIN  1.6*  1.5  1.7*   GLUCOSE  168*  125*  104*       Heme:  Recent Labs      18   RBC  2.78*   < >  2.58*   < >  2.53*   --   2.55*  2.88*   HEMOGLOBIN  8.0*   < >  7.3*   < >  7.2*   --   7.5*  8.1*   HEMATOCRIT  23.8*   < >  22.4*   < >  22.4*   --   22.3*  25.7*   PLATELETCT  113*   < >  97*   < >  91*   --   93*  113*   PROTHROMBTM  16.7*   --   16.7*   --    --   17.9*   --    --    INR  1.38*   --   1.38*   --    --   1.51*   --    --     < > = values in this interval not displayed.       Infectious Disease:  Temp  Av.1 °C (97 °F)  Min: 36.1 °C (97 °F)  Max: 36.1 °C (97 °F)  Recent Labs      18   WBC  8.9   < >  6.5   < >  6.6  5.8  6.5   NEUTSPOLYS  54.10   < >  60.40   < >  57.20  57.30  69.50   LYMPHOCYTES  36.60   < >  31.10   < >  31.00  33.20  22.70   MONOCYTES  5.20   < >  4.30   < >  6.80  5.50  5.20   EOSINOPHILS  2.80   < >  3.20   < >  3.90  3.10  1.70   BASOPHILS  0.60   < >  0.50   < >  0.80  0.70  0.60   ASTSGOT  29   --   26   --    --   24   --    ALTSGPT  21   --   21   --    --   18   --    ALKPHOSPHAT  40   --   45   --    --   50   --    TBILIRUBIN  1.6*   --   1.5   --     --   1.7*   --     < > = values in this interval not displayed.       Meds:  • MD ALERT...Adult ICU Electrolyte Replacement per Pharmacy Protocol       • insulin regular  1-6 Units      And   • glucose 4 g  16 g      And   • dextrose 50%  25 mL     • omeprazole  20 mg     • ondansetron  4 mg     • fentaNYL  25 mcg     • thiamine  100 mg     • cefTRIAXone (ROCEPHIN) IV  2 g     • nicotine  14 mg      And   • nicotine polacrilex  2 mg          Procedures:  None     Imaging:  ECHOCARDIOGRAM COMP W/O CONT   Final Result      CT-ABDOMEN-PELVIS WITH   Final Result      1.  Gastroesophageal and splenorenal varices with recanalization of the umbilical vein. There is also prominent splenomegaly consistent with portal hypertension. There is small amount of ascites.      2.  Fatty liver.      3.  Enlarged periportal and portacaval lymph nodes.      4.  No evidence of bowel obstruction.      DX-CHEST-PORTABLE (1 VIEW)   Final Result      1.  No acute cardiopulmonary disease.   2.  Supportive tubing as described above.      IR-TIPS    (Results Pending)       Problem and Plan:      * Esophageal varices with bleeding (CMS-HCC)- (present on admission)   Assessment & Plan     Hx of Alcoholic liver disease and possible fatty liver.   S/p esophageal variceal  Banding in 10/2017.  Per patient he stoppped drinking alcohol after diagnosis and procedure in October.  Presented to the ED with hx of hemtemesis, light headedness and Hgb of ~3  On octreotide , PPI, antibiotics, and EV ligation repeated on March 10  S/p EGD 03/10 : large esophageal varices s/p banding of esophageal varices x 5 and gastric varices.  Octreotide stopped on 03/13  Echo of heart grossly normal. TIPS on 03/15  Transfuse if Hgb <7                           Alcoholic cirrhosis (CMS-HCC)- (present on admission)   Assessment & Plan    Significant drinking history ~ 8years.  Patient states he quit in October 2017 after a diagnosis of liver cirrhosis and esophageal  variceal banding .   Ultrasound 10/2017 showed enlarged liver, fatty infiltration and/or cirrhotic change  EGD 10/2017 showed Esophageal Varices, Gastrica varices and portal-hypertensive gastropathy .  EGD 03/2018 EV GV, s/p EV ligation.   No evidence of hepatic encephalopathy.  Planned TIPS, keep close monitor on encephalopathy after TIPS.                     Peripheral neuropathy (CMS-HCC)   Assessment & Plan    Likely due to hx of alcohol , DM  Improved and stable.         Diabetes mellitus (CMS-HCC)- (present on admission)   Assessment & Plan    Type 2, diagnosed in 10/2017, was on metformin.   With neuropathy, likely due to alcohol use.  Hgb A1C:   08/30/17: 8.4, 03/2018: 5.6 (not correct due to recent bleeding and transfusion)  Hypoglycemic protocol and very low dose Sliding if needed.               DISPO: ICU    CODE STATUS: full    Quality Measures:  Larson Catheter: None   DVT Prophylaxis: None  Ulcer Prophylaxis: On PPI for GI bleed   Antibiotics: Ceftriaxone (03/09-->3/16)  Lines: RIJ, PIV

## 2018-03-14 NOTE — PROGRESS NOTES
IR Procedure Note:    Patient arrived to IR2.  Patient A&Ox4 on room air and in no apparent distress.  Patient consented by Dr Sanchez and all questions answered.  Anesthesia provided by Dr Shields.   Dr. Sanchez performed transjugular intrahepatic portosystemic shunt placement.  Right internal jugular accessed.  Gauze and tegaderm applied to right IJ, CDI and soft with no signs of bleeding.  Patient alert and oriented and verbally appropriate post procedure. RN and anesthesiologist transported patient to T633 and bedside report given to BECKY Pablo.      Portal pressures:  Right atrium:  Pre-4  Post-10.5  Portal Vein:  Pre-19  Post-14      [place] Viatorr  Tips Endoprosthesis 10mm x 6c / 2cm  REF [id number]  [id number]

## 2018-03-14 NOTE — DISCHARGE PLANNING
Care Transitions Team    Situation: Pt is 28yo male admitted for acute GI bleed.    Background: Pt is resident of [facility]. Pt appears to live w/ family.     Assessment: Pt up independently.    Recommendation/Requests: Pt may need d/c home w/ HH but no need for therapy at this time.

## 2018-03-14 NOTE — CARE PLAN
Problem: Communication  Goal: The ability to communicate needs accurately and effectively will improve  Outcome: PROGRESSING AS EXPECTED      Problem: Safety  Goal: Will remain free from falls  Outcome: PROGRESSING AS EXPECTED

## 2018-03-14 NOTE — DISCHARGE PLANNING
Medical SW    Sw attended AM IDT Rounds.    RN reports, pt order for tele transfer in place, medically stable, totally independent,       Plan: Sw to assist w/ d/c planning as needed.

## 2018-03-14 NOTE — CARE PLAN
Problem: Safety  Goal: Will remain free from injury  Outcome: PROGRESSING AS EXPECTED  Pt. Alert and oriented, up ad muriel, rings for assistance    Problem: Pain Management  Goal: Pain level will decrease to patient's comfort goal  Outcome: PROGRESSING AS EXPECTED  Pain control adequate. PRN pain meds as needed

## 2018-03-14 NOTE — OR SURGEON
Immediate Post- Operative Note        PostOp Diagnosis: portal HTN, esophageal variceal bleeding, cirrhosis      Procedure(s): TIPS, gradient 15 mmHg --> 3.5 mmHg      Estimated Blood Loss: Less than 5 ml        Complications: None            3/14/2018     3:53 PM     Mitchell Sanchez

## 2018-03-14 NOTE — DISCHARGE SUMMARY
Internal Medicine Discharge Summary  Note Author: Lance Melo M.D.       Admit Date:  3/9/2018       Discharge Date:   03/15/18    Service:   [facility] Gold/Blue Team  Attending Physician(s):   Dr. Albert/Dr. Manjarrez       Senior Resident(s):   Lorie/Dr. Newell  Leo Resident(s):   Melo      Primary Diagnosis:   Upper GI bleeding, Esophageal varices, cirrhosis.     Secondary Diagnoses:                Principal Problem:    Esophageal varices with bleeding (CMS-HCC) POA: Yes  Active Problems:    Alcoholic cirrhosis (CMS-HCC) POA: Yes    Diabetes mellitus (CMS-HCC) POA: Yes    Peripheral neuropathy (CMS-HCC) POA: Unknown  Resolved Problems:    * No resolved hospital problems. *      H/P note written by Dr. Melo on 03/09  27 yr old M with Hx of cirrhosis (alcohol, fatty liver), type 2 DM, elevated cholesterol, and neuropathy presented to ED due to presyncope and GI bleeding.      The patient was admitted in 10/2017 due to acute GI bleeding, scope showed EV GV and portal hypertension gastritis. Ultrasound showed fatty liver. Discharged with PPI and did not have regular GI follow up due to insurance issue. He is completely no alcohol since discharge.      On March 6, he started feeling not good, some mild GI discomfort and mild nausea. Symptoms got worse a bit on Thur and he felt tired and no activity. On March 9, bloody vomitus with some blood clots 2 times and also dark stool at home. Nearly syncope and he was sent to ED.      The patient had Hgb 3 at ED, alert and no evidence of encephalopathy. Central line done, 1unit of RBC, PLT, FFP done. GI team (Dr. Mendoza) saw the patient, plan to do scope tmr morning, but urgent scope overnight might be still needed. He also understand we might intubate him overnight if major vomiting happens again to protect his airway.    Hospital Summary (Brief Narrative):       26 yo male   with a hx of esophageal varices and liver cirrhosis 2/2 Alcohol  presented to the ED with hematemesis, blood in stool and lightheadedness. Patient had banding of his esophageal varices in 10/2017. Patient states stopped drinking alcohol after procedure in October 2017.    In the ED, he was found to be tachycardic, /46, Hgb was 3.5. Massive transfusion protocol was initiated and he was given 1unit of FFP, platelets  and PRBC. A central line was placed. GI was consulted.   Patient received so many blood products PRBC, Platelets, FFP before emergent EGD as he was having bloody emesis with clots and bloody stools    He had an EGD on 03/ 10 which showed large esophageal varices which was banded x5 and gastric varices. Hgb has been stable ~ 7.  TIPS was done uneventfully by IR team on 03/14. The patient has stable Hgb, no evidence of encephalopathy, no abd pain, no other subjective complaints after 1 overnight observation. The patient was seen by Dr. Manjarrez on 03/15 and discharged amaya.     [facility]     Patient /Hospital Summary (Details -- Problem Oriented) :          Alcoholic cirrhosis (CMS-HCC)   Assessment & Plan    Significant drinking history ~ 8years.  Patient states he quit in October 2017 after a diagnosis of liver cirrhosis and esophageal variceal banding .   Ultrasound 10/2017 showed enlarged liver, fatty infiltration and/or cirrhotic change  EGD 10/2017 showed Esophageal Varices, Gastrica varices and portal-hypertensive gastropathy .  EGD 03/2018 EV GV, s/p EV ligation.   TIPS done on 03/14.   No evidence of hepatic encephalopathy, bleeding after overnight observation.   [facility]. He need hepatitis vaccinations and also avoid uncooked/undercooked pork and fish/oyster/sushi                       * Esophageal varices with bleeding (CMS-HCC)   Assessment & Plan     Hx of Alcoholic liver disease and possible fatty liver.   S/p esophageal  variceal  Banding in 10/2017.  Per patient he stoppped drinking alcohol after diagnosis and procedure in October.  Presented to the ED with hx of hemtemesis, light headedness and Hgb of ~3  On octreotide , PPI, antibiotics, and EV ligation repeated on March 10  S/p EGD 03/10 : large esophageal varices s/p banding of esophageal varices x 5 and gastric varices.  Octreotide stopped on   Echo of heart grossly normal. TIPS on . Low dose b-blocker and PPI at discharge.                          Peripheral neuropathy (CMS-HCC)   Assessment & Plan    Likely due to hx of alcohol , DM  Improved and stable.         Diabetes mellitus (CMS-HCC)   Assessment & Plan    Type 2, diagnosed in 10/2017, was on metformin.   With neuropathy, likely due to alcohol use.  Hgb A1C:   17: 8.4, 2018: 5.6 (not correct due to recent bleeding and transfusion)  Hypoglycemic protocol and very low dose Sliding if needed.   Resume his statin and metformin at discharge.               Consultants:     GI  PMA/critical      Procedures:        Esophageal ligation on 03/10  IR TIPS     Imaging/ Testin/10 CT abd  1.  Gastroesophageal and splenorenal varices with recanalization of the umbilical vein. There is also prominent splenomegaly consistent with portal hypertension. There is small amount of ascites.  2.  Fatty liver.  3.  Enlarged periportal and portacaval lymph nodes.  4.  No evidence of bowel obstruction.     Echo  Normal left ventricular size, thickness, systolic function, and   diastolic function.  Left ventricular ejection fraction is visually estimated to be 65%.  Unable to estimate pulmonary artery pressure due to an inadequate   tricuspid regurgitant jet.  No valve abnormalities.      TIPS  1. Portal Hypertension with initial portosystemic gradient of 15 mmHg.  2. TIPS shunt with placement of a 10 mm Viatorr, 6 cm covered length, dilated to 10 mm.  3. Final portosystemic gradient 3.5  mmHg.    Discharge Medications:         Medication Reconciliation: Completed       Medication List      START taking these medications      Instructions   omeprazole 20 MG delayed-release capsule  Commonly known as:  PRILOSEC   Take 1 Cap by mouth 2 Times a Day.  Dose:  20 mg        CONTINUE taking these medications      Instructions   atorvastatin 20 MG Tabs  Commonly known as:  LIPITOR   Take 1 Tab by mouth every day.  Dose:  20 mg     metFORMIN 500 MG Tabs  Commonly known as:  GLUCOPHAGE   Take 2 Tabs by mouth 2 times a day, with meals.  Dose:  1000 mg            Disposition:   home    Diet:   Soft diet/regular diet.     Activity:   There is no new restriction from this admission.       Instructions:      1. Need GI/Liver team regular follow up  2. Need vaccination for hep A/B  3. Regular image study for cirrhosis  4. Avoid uncooked/undercooked pork and fish.     The patient was instructed to return to the ER in the event of worsening symptoms. I have counseled the patient on the importance of compliance and the patient has agreed to proceed with all medical recommendations and follow up plan indicated above.   The patient understands that all medications come with benefits and risks. Risks may include permanent injury or death and these risks can be minimized with close reassessment and monitoring.        Primary Care Provider:    Dr. Barnes.   Discharge summary faxed to primary care provider:  Yes   Copy of discharge summary given to the patient: N/A at the time of discharge.       Follow up appointment details :      [facility] to [facility]/Liver follow-up    Pending Studies:        none    Time spent on discharge day patient visit, preparing discharge paperwork and arranging for patient follow up.      Discharge Time (Minutes) :   45 mins  Hospital Course Type:  Inpatient Stay >2 midnights      Condition on Discharge     ______________________________________________________________________    Interval history/exam for day of discharge:    TIPS done uneventful on 03/14. No abd pain, no encephalopathy with stable Hgb on 03/15.   Discharge home in the afternoon.       Vitals:    03/15/18 0514 03/15/18 0528 03/15/18 0750 03/15/18 1156   BP:  136/70 132/83 127/72   Pulse:  (!) 51 96 (!) 111   Resp:  16 18 18   Temp: 36.2 °C (97.2 °F) 36.8 °C (98.2 °F) 36.6 °C (97.8 °F) 36.8 °C (98.3 °F)   SpO2:  91% 99% 98%   Weight:       Height:         Weight/BMI: Body mass index is 29.27 kg/m².  Pulse Oximetry: 98 %, O2 (LPM): 0, O2 Delivery: None (Room Air)    General: Stable vitals. Good spirit/activity.   CVS: S1 S2 noted, no chest pain, no palpitation   PULM: Clear breathing sound. No resp distress.   Other systems checked; grossly normal.     Most Recent Labs:    Lab Results   Component Value Date/Time    WBC 6.8 03/15/2018 02:53 AM    RBC 2.96 (L) 03/15/2018 02:53 AM    HEMOGLOBIN 8.2 (L) 03/15/2018 02:53 AM    HEMATOCRIT 25.4 (L) 03/15/2018 02:53 AM    MCV 85.8 03/15/2018 02:53 AM    MCH 27.7 03/15/2018 02:53 AM    MCHC 32.3 (L) 03/15/2018 02:53 AM    MPV 11.1 03/15/2018 02:53 AM    NEUTSPOLYS 62.80 03/15/2018 02:53 AM    LYMPHOCYTES 28.10 03/15/2018 02:53 AM    MONOCYTES 6.80 03/15/2018 02:53 AM    EOSINOPHILS 1.30 03/15/2018 02:53 AM    BASOPHILS 0.90 03/15/2018 02:53 AM      Lab Results   Component Value Date/Time    SODIUM 138 03/13/2018 09:00 AM    POTASSIUM 3.9 03/13/2018 09:00 AM    CHLORIDE 109 03/13/2018 09:00 AM    CO2 24 03/13/2018 09:00 AM    GLUCOSE 104 (H) 03/13/2018 09:00 AM    BUN 6 (L) 03/13/2018 09:00 AM    CREATININE 0.73 03/13/2018 09:00 AM      Lab Results   Component Value Date/Time    ALTSGPT 18 03/13/2018 09:00 AM    ASTSGOT 24 03/13/2018 09:00 AM    ALKPHOSPHAT 50 03/13/2018 09:00 AM    TBILIRUBIN 1.7 (H) 03/13/2018 09:00 AM    LIPASE 21 03/09/2018 04:00 PM    ALBUMIN 2.7 (L) 03/13/2018 09:00 AM    GLOBULIN 2.3  03/13/2018 09:00 AM    INR 1.51 (H) 03/13/2018 04:59 AM     Lab Results   Component Value Date/Time    PROTHROMBTM 17.9 (H) 03/13/2018 04:59 AM    INR 1.51 (H) 03/13/2018 04:59 AM

## 2018-03-14 NOTE — PROGRESS NOTES
Pulmonary Critical Care Progress Note        Admit 3/9/2018    Chief Complaint: Hematemesis    History of Present Illness:   This is a 27-year-old male who is a    from the [place] deploy[place], presents with history of lightheadedness,   dizziness, hematemesis, and melena.  The patient states that he has known   history of esophageal varices as well as with early cirrhosis and has had been   drinking heavily for the last 10 years, but quit approximately 4 months ago.    The patient at that point had esophageal varices and was banded.  The patient   states that he has had ongoing bleeding.  The patient has had no significant   chest pain or shortness of breath associated with this.  He states that he was   near syncopal.  His heart rate has been elevated as well.  The patient's wife   did help him with getting him off the floor after he was very weak and   lightheaded.  Once ambulance arrived, IV was established and the patient was   transferred for higher level of care.  The patient has at this time no   routine.    Review of Systems   Constitutional: Negative for chills, diaphoresis, fever and malaise/fatigue.   HENT: Negative for congestion, nosebleeds and sore throat.    Eyes: Negative.    Respiratory: Negative for cough, hemoptysis, sputum production and shortness of breath.    Cardiovascular: Negative for chest pain and palpitations.   Gastrointestinal: Negative for abdominal pain (improved), blood in stool, diarrhea, heartburn, melena, nausea and vomiting.        Improving signs of recent bleed, no new hematemesis   Genitourinary: Negative.    Musculoskeletal: Negative.    Skin: Negative.    Neurological: Negative for sensory change, speech change, focal weakness, weakness and headaches.   Endo/Heme/Allergies: Negative.    Psychiatric/Behavioral: The patient has insomnia. The patient is not nervous/anxious.        Interval Events:  24 hour interval history reviewed     A&O x4  Ambulating  No new GI  S/s  Happy to be finally gettng TIPs today - moved up by IR  No  melena, hematochezia or hematemesis  Mechanics reviewed, no hypotension  Afebrile  Starting a soft diet yesterday, no GI issues  Still having a hard time sleeping here in the hospital  Day 6 of 7 of ceftriaxone  Protonix switched to omeprazole by mouth  Octreotide stopped yesterday    Reviewed later in day post TIPS procedure with interventional radiologist who stated patient did very well without any apparent competition       YESTERDAY  A&O x4  IVF x 100cc/hr  PPI bolus ongoing no further nausea/vomiting or clinical signs of GI bleeding  Ceftriaxone ongoing for prophylaxis  Mg 1.6  TIPs for today - delayed yesterday by IR til thurs  Hgb no change overnight  Tm 98.0  Octreotide infusion ongoing for variceal bleeding  Starting to ambulate  Reviewed alcohol cessation plans at length with patient at bedside  Discussed etiology of cirrhosis patient including EtOH and CARLTON etiologies      PFSH:  No change.    Physical Exam   Constitutional: He appears well-developed and well-nourished. He is cooperative.  Non-toxic appearance. He does not appear ill. No distress.   HENT:   Head: Normocephalic and atraumatic.   Eyes: Pupils are equal, round, and reactive to light. Scleral icterus is present.   Neck: Phonation normal. No JVD present.   Cardiovascular: Normal rate, regular rhythm, normal heart sounds and intact distal pulses.  Exam reveals no gallop and no friction rub.    No murmur heard.  Pulmonary/Chest: Effort normal. No respiratory distress. He has no wheezes. He has no rales.   Abdominal: Soft. Bowel sounds are normal. He exhibits ascites. He exhibits no distension, no fluid wave and no mass. There is no hepatosplenomegaly. There is no tenderness. There is no rebound, no guarding and negative Ferrell's sign.   Neurological: He is alert. No cranial nerve deficit. He exhibits normal muscle tone. Coordination normal.   Skin: Skin is warm and dry. Capillary  refill takes less than 2 seconds. He is not diaphoretic. No erythema.   Psychiatric: He has a normal mood and affect. His behavior is normal. Thought content normal.   Nursing note and vitals reviewed.  No change     Respiratory:     Pulse Oximetry: 97 %        Room air    ImagingCXR  I have personally reviewed the chest x-ray my impression is  noted above and films are reviewed with Team on rounds        Invalid input(s): [id number]     HemoDynamics:  Heart Rate (Monitored): 98  NIBP: 130/80       Imaging: Available data reviewed        Neuro:  GCS Total Maria Victoria Coma Score: 15     Imaging: Available data reviewed    Fluids:  Intake/Output       03/12/18 0700 - 03/13/18 0659 03/13/18 0700 - 03/14/18 0659 03/14/18 0700 - 03/15/18 0659      4497-5005 9571-7324 Total 3794-3810 9081-5326 Total 0677-2948 7129-8284 Total       Intake    P.O.  1000  825 1825  550  650 1200  --  -- --    P.O. 8734 890 2298  -- -- --    I.V.  1240  520 1760  50  -- 50  --  -- --    Octreotide Volume 40 120 160 50 -- 50 -- -- --    IV Volume (NS maintenance ) 7056 021 3079 0 -- 0 -- -- --    Total Intake 2240 1345 3585  -- -- --       Output    Urine  650  -- 650  --  -- --  --  -- --    Number of Times Voided 3 x 3 x 6 x 2 x 3 x 5 x -- -- --    Void (ml) 650 -- 650 -- -- -- -- -- --    Stool  --  -- --  --  -- --  --  -- --    Number of Times Stooled 1 x -- 1 x -- -- -- -- -- --    Total Output 650 -- 650 -- -- -- -- -- --       Net I/O     1590 1345 2935  -- -- --           Recent Labs      03/11/18   1346  03/12/18   0247  03/12/18   0930  03/13/18   0459  03/13/18   0900   SODIUM  136   --   137   --   138   POTASSIUM  3.7   --   3.7   --   3.9   CHLORIDE  110   --   110   --   109   CO2  21   --   24   --   24   BUN  11   --   8   --   6*   CREATININE  0.76   --   0.70   --   0.73   MAGNESIUM   --   1.9   --   1.6   --    CALCIUM  7.4*   --   7.5*   --   7.7*       GI/Nutrition:  Imaging: Available  data reviewed   U/s ABD 10/11/2018:  1.  Enlarged liver with increased echogenicity which represent fatty infiltration and/or cirrhotic change. No mass or biliary dilatation.  2.  No cholelithiasis. Contracted gallbladder.  3.  Normal appearance of the pancreas.  4.  Normal hepatopedal flow in the portal vein. No portal vein enlargement.  5.  No ascites.  6.  Splenomegaly. Small accessory spleen is present.    taking PO     Liver Function  Recent Labs      18   1346  18   0930  18   09   ALTSGPT  21  21  18   ASTSGOT  29  26  24   ALKPHOSPHAT  40  45  50   TBILIRUBIN  1.6*  1.5  1.7*   GLUCOSE  168*  125*  104*       Heme:  Recent Labs      18   1346   18   0247   18   0302  18   0459  18   RBC  2.78*   < >  2.58*   < >  2.53*   --   2.55*  2.88*   HEMOGLOBIN  8.0*   < >  7.3*   < >  7.2*   --   7.5*  8.1*   HEMATOCRIT  23.8*   < >  22.4*   < >  22.4*   --   22.3*  25.7*   PLATELETCT  113*   < >  97*   < >  91*   --   93*  113*   PROTHROMBTM  16.7*   --   16.7*   --    --   17.9*   --    --    INR  1.38*   --   1.38*   --    --   1.51*   --    --     < > = values in this interval not displayed.       Infectious Disease:  Temp  Av.4 °C (97.5 °F)  Min: 36.1 °C (97 °F)  Max: 36.5 °C (97.7 °F)  Micro: reviewed  Recent Labs      18   1346   18   0930   18   0302  18   0918   WBC  8.9   < >  6.5   < >  6.6  5.8  6.5   NEUTSPOLYS  54.10   < >  60.40   < >  57.20  57.30  69.50   LYMPHOCYTES  36.60   < >  31.10   < >  31.00  33.20  22.70   MONOCYTES  5.20   < >  4.30   < >  6.80  5.50  5.20   EOSINOPHILS  2.80   < >  3.20   < >  3.90  3.10  1.70   BASOPHILS  0.60   < >  0.50   < >  0.80  0.70  0.60   ASTSGOT  29   --   26   --    --   24   --    ALTSGPT  21   --   21   --    --   18   --    ALKPHOSPHAT  40   --   45   --    --   50   --    TBILIRUBIN  1.6*   --   1.5   --    --   1.7*   --     < > = values  in this interval not displayed.     Current Facility-Administered Medications   Medication Dose Frequency Provider Last Rate Last Dose   • MD ALERT...Adult ICU Electrolyte Replacement per Pharmacy Protocol   PRN Jim Albert M.D.       • insulin regular (HUMULIN R) injection 1-6 Units  1-6 Units 4X/DAY ACHMANNIE Albert M.D.   Stopped at 03/13/18 1100    And   • glucose 4 g chewable tablet 16 g  16 g Q15 MIN PRN Jim Albert M.D.        And   • dextrose 50% (D50W) injection 25 mL  25 mL Q15 MIN PRN Jim Albert M.D.       • omeprazole (PRILOSEC) capsule 20 mg  20 mg BID Jim Albert M.D.   20 mg at 03/13/18 2036   • ondansetron (ZOFRAN) syringe/vial injection 4 mg  4 mg Q4HRS PRN Jim Albert M.D.   4 mg at 03/10/18 1103   • fentaNYL (SUBLIMAZE) injection 25 mcg  25 mcg Q HOUR PRN Jim Albert M.D.       • thiamine (THIAMINE) tablet 100 mg  100 mg DAILY Jim Albert M.D.   100 mg at 03/13/18 0916    And   • folic acid (FOLVITE) tablet 1 mg  1 mg DAILY Jim Albert M.D.   1 mg at 03/13/18 0916    And   • multivitamin (THERAGRAN) tablet 1 Tab  1 Tab DAILY Jim Albert M.D.   1 Tab at 03/13/18 0916   • cefTRIAXone (ROCEPHIN) syringe 2 g  2 g Q24HRS Jim Albert M.D.   2 g at 03/13/18 2036   • nicotine (NICODERM) 14 MG/24HR 14 mg  14 mg Daily-0600 Lance Melo M.D.   14 mg at 03/11/18 0600    And   • nicotine polacrilex (NICORETTE) 2 MG piece 2 mg  2 mg Q HOUR PRN Lance Melo M.D.         Last reviewed on 3/9/2018  9:13 PM by Carmen Pham R.N.    Quality  Measures:  Core Measures & Quality Metrics    Problems/plan:  Hemorrhagic shock -> Severe blood loss anemia/hypotension   - NPO - start by mouth post-TIPS if okay with GI   - Continue fluid and blood product resuscitation as needed   - Serial CBC and TEG/mapping     Massive upper gastrointestinal bleed -r esolved since banding   -  from esophageal varices in the setting of portal hypertension   -  H/o prior GI bleed - variceal   - s/p variceal banding   - TIPS planned for 3/12, delayed by IR to 3/13   - ECHO noted/CT abdomen noted - both okay for TIPS   - continue Octreotide infusion   - continue Protonix infusion - post-TIPS convert to by mouth    Alcohol abuse    - The patient currently reportedly sober, reports last drink months ago   - H/o cirrhosis secondary to ETOH   - Monitor for withdrawal - currently no signs   - Thiamine/MVI/Folate - IV ×3 days    Microcytic anemia.   - When acute process over assess iron stores and consider iron therapy    Metabolic acidosis with predominant lactic acidosis - improved   - Probably secondary to anemia   - Transfuse as needed   - Nothing to suggest sepsis   - Empiric treatment for SBP prophylaxis with Ceftriaxone    Coagulopathy, likely related to acute on chronic liver disease.   - TEG normal   - Further blood products as needed     TOB use   - Cessation encouraged again   - Nicotine patch    Diabetes Mellitus Type II   - H/o neuropathy   - Hold Metformin/ACE/ARB   - SSI, accuchecks    Prophylaxis: SCDs, protonix     Prognosis remains guarded.   TIPS rescheduled for today  Monitor in ICU overnight  CBC and chemistry panel in a.m.  Continue soft diet  Hep-Lock IV fluids   Continue PPI  Follow up with GI as an outpatient  RCC will sign off when patient transfers out of ICU    Discussed patient condition and risk of morbidity and/or mortality with RN, RT, Pharmacy, Dietary, , [facility] Gold resident, Charge nurse / hot rounds, Patient and Interventional Radiology.

## 2018-03-15 VITALS
RESPIRATION RATE: 18 BRPM | OXYGEN SATURATION: 99 % | BODY MASS INDEX: 29.38 KG/M2 | WEIGHT: 209.88 LBS | DIASTOLIC BLOOD PRESSURE: 75 MMHG | HEART RATE: 112 BPM | SYSTOLIC BLOOD PRESSURE: 129 MMHG | TEMPERATURE: 99.3 F | HEIGHT: 71 IN

## 2018-03-15 LAB
BASOPHILS # BLD AUTO: 0.9 % (ref 0–1.8)
BASOPHILS # BLD: 0.06 K/UL (ref 0–0.12)
EOSINOPHIL # BLD AUTO: 0.09 K/UL (ref 0–0.51)
EOSINOPHIL NFR BLD: 1.3 % (ref 0–6.9)
ERYTHROCYTE [DISTWIDTH] IN BLOOD BY AUTOMATED COUNT: 50.2 FL (ref 35.9–50)
GLUCOSE BLD-MCNC: 108 MG/DL (ref 65–99)
GLUCOSE BLD-MCNC: 87 MG/DL (ref 65–99)
HCT VFR BLD AUTO: 25.4 % (ref 42–52)
HGB BLD-MCNC: 8.2 G/DL (ref 14–18)
IMM GRANULOCYTES # BLD AUTO: 0.01 K/UL (ref 0–0.11)
IMM GRANULOCYTES NFR BLD AUTO: 0.1 % (ref 0–0.9)
LYMPHOCYTES # BLD AUTO: 1.9 K/UL (ref 1–4.8)
LYMPHOCYTES NFR BLD: 28.1 % (ref 22–41)
MAGNESIUM SERPL-MCNC: 1.7 MG/DL (ref 1.5–2.5)
MCH RBC QN AUTO: 27.7 PG (ref 27–33)
MCHC RBC AUTO-ENTMCNC: 32.3 G/DL (ref 33.7–35.3)
MCV RBC AUTO: 85.8 FL (ref 81.4–97.8)
MONOCYTES # BLD AUTO: 0.46 K/UL (ref 0–0.85)
MONOCYTES NFR BLD AUTO: 6.8 % (ref 0–13.4)
NEUTROPHILS # BLD AUTO: 4.24 K/UL (ref 1.82–7.42)
NEUTROPHILS NFR BLD: 62.8 % (ref 44–72)
NRBC # BLD AUTO: 0 K/UL
NRBC BLD-RTO: 0 /100 WBC
PLATELET # BLD AUTO: 99 K/UL (ref 164–446)
PMV BLD AUTO: 11.1 FL (ref 9–12.9)
RBC # BLD AUTO: 2.96 M/UL (ref 4.7–6.1)
WBC # BLD AUTO: 6.8 K/UL (ref 4.8–10.8)

## 2018-03-15 PROCEDURE — 83735 ASSAY OF MAGNESIUM: CPT

## 2018-03-15 PROCEDURE — 99239 HOSP IP/OBS DSCHRG MGMT >30: CPT | Mod: GC | Performed by: INTERNAL MEDICINE

## 2018-03-15 PROCEDURE — A9270 NON-COVERED ITEM OR SERVICE: HCPCS | Performed by: INTERNAL MEDICINE

## 2018-03-15 PROCEDURE — 85025 COMPLETE CBC W/AUTO DIFF WBC: CPT

## 2018-03-15 PROCEDURE — 82962 GLUCOSE BLOOD TEST: CPT | Mod: 91

## 2018-03-15 PROCEDURE — 700102 HCHG RX REV CODE 250 W/ 637 OVERRIDE(OP): Performed by: INTERNAL MEDICINE

## 2018-03-15 RX ORDER — OMEPRAZOLE 20 MG/1
20 CAPSULE, DELAYED RELEASE ORAL 2 TIMES DAILY
Qty: 30 CAP | Refills: 2 | Status: SHIPPED | OUTPATIENT
Start: 2018-03-15 | End: 2018-03-15

## 2018-03-15 RX ORDER — OMEPRAZOLE 20 MG/1
20 CAPSULE, DELAYED RELEASE ORAL 2 TIMES DAILY
Qty: 30 CAP | Refills: 2 | Status: SHIPPED | OUTPATIENT
Start: 2018-03-15 | End: 2019-03-12

## 2018-03-15 RX ADMIN — NICOTINE 14 MG: 14 PATCH, EXTENDED RELEASE TRANSDERMAL at 05:54

## 2018-03-15 RX ADMIN — OMEPRAZOLE 20 MG: 20 CAPSULE, DELAYED RELEASE ORAL at 08:18

## 2018-03-15 ASSESSMENT — PAIN SCALES - GENERAL
PAINLEVEL_OUTOF10: 0
PAINLEVEL_OUTOF10: 1
PAINLEVEL_OUTOF10: 0

## 2018-03-15 NOTE — DISCHARGE INSTRUCTIONS
Discharge Instructions    Discharged to home by car with relative. Discharged via walking, hospital escort: Refused.  Special equipment needed: Not Applicable    Be sure to schedule a follow-up appointment with your primary care doctor or any specialists as instructed.     Discharge Plan:   Influenza Vaccine Indication: Not indicated: Previously immunized this influenza season and > 8 years of age    I understand that a diet low in cholesterol, fat, and sodium is recommended for good health. Unless I have been given specific instructions below for another diet, I accept this instruction as my diet prescription.   Other diet: low fiber, soft, diabetic    Special Instructions: None    · Is patient discharged on Warfarin / Coumadin?   No     Depression / Suicide Risk    As you are discharged from this [facility] facility, it is important to learn how to keep safe from harming yourself.    Recognize the warning signs:  · Abrupt changes in personality, positive or negative- including increase in energy   · Giving away possessions  · Change in eating patterns- significant weight changes-  positive or negative  · Change in sleeping patterns- unable to sleep or sleeping all the time   · Unwillingness or inability to communicate  · Depression  · Unusual sadness, discouragement and loneliness  · Talk of wanting to die  · Neglect of personal appearance   · Rebelliousness- reckless behavior  · Withdrawal from people/activities they love  · Confusion- inability to concentrate     If you or a loved one observes any of these behaviors or has concerns about self-harm, here's what you can do:  · Talk about it- your feelings and reasons for harming yourself  · Remove any means that you might use to hurt yourself (examples: pills, rope, extension cords, firearm)  · Get professional help from the community (Mental Health, Substance Abuse, psychological counseling)  · Do not be alone:Call your Safe Contact- someone whom you trust who  will be there for you.  · Call your local CRISIS HOTLINE 470-2605 or 253-046-4895  · Call your local Children's Mobile Crisis Response Team [place] (306) 993-8293 or www.S[employer]  · Call the toll free National Suicide Prevention Hotlines   · National Suicide Prevention Lifeline 818-037-UQZN (2578)  · BPT Line Network 800-SUICIDE (532-3208)    Transjugular Intrahepatic Portosystemic Shunt Insertion  Transjugular intrahepatic portosystemic shunt (TIPS) insertion is a procedure to restore blood flow through the liver. This procedure may be done when a blockage has caused problems with the normal flow of blood through the liver, resulting in increased pressure (hypertension) in the vein that carries blood to the liver (portal vein). Portal hypertension is usually caused by scar tissue from liver disease, such as cirrhosis. It can cause serious problems. A TIPS procedure may be done if portal hypertension or liver disease has caused problems such as:  · Bleeding from the esophagus or stomach.  · Buildup of fluid in the abdomen.  In a TIPS procedure, a new pathway for blood flow is created through the liver. A small wire-mesh tube called a shunt or stent is placed inside the pathway to keep it open. The shunt is passed down the jugular vein in your neck. Then, the shunt is inserted between the portal vein and a vein on the other side of the liver (hepatic vein), which is a vein that blood flows through when it leaves the liver. The shunt connects these blood vessels and improves blood flow across the liver.  Tell a health care provider about:  · Any allergies you have.  · All medicines you are taking, including vitamins, herbs, eye drops, creams, and over-the-counter medicines.  · Any problems you or family members have had with anesthetic medicines.  · Any blood disorders you have.  · Any surgeries you have had.  · Any medical conditions you have.  · Whether you are pregnant or may be  pregnant.  What are the risks?  Generally, this is a safe procedure. However, problems may occur, including:  · Allergic reactions to medicines or dyes.  · Bleeding into the abdomen.  · Irregular heartbeat (cardiac arrhythmia).  · Decline in brain function (encephalopathy) in people with severe liver disease. This may be a problem after the TIPS has been inserted, and it may require treatment. People who have had encephalopathy may not be good candidates for TIPS.  · Blockage of the shunt. This may lead to a return of the problems. If this occurs, a new shunt can be placed or the existing shunt can be adjusted. You will have ultrasound exams to evaluate for a blockage of the shunt.  What happens before the procedure?  Staying hydrated   Follow instructions from your health care provider about hydration, which may include:  · Up to 2 hours before the procedure - you may continue to drink clear liquids, such as water, clear fruit juice, black coffee, and plain tea.  Eating and drinking restrictions   Follow instructions from your health care provider about eating and drinking, which may include:  · 8 hours before the procedure - stop eating heavy meals or foods such as meat, fried foods, or fatty foods.  · 6 hours before the procedure - stop eating light meals or foods, such as toast or cereal.  · 6 hours before the procedure - stop drinking milk or drinks that contain milk.  · 2 hours before the procedure - stop drinking clear liquids.  Medicines   Ask your health care provider about:  · Changing or stopping your regular medicines. This is especially important if you are taking diabetes medicines or blood thinners.  · Taking medicines such as aspirin and ibuprofen. These medicines can thin your blood. Do not take these medicines before your procedure if your health care provider instructs you not to.  General instructions  · You may have blood tests to see how well your kidneys and liver are working and to see how  well your blood can clot.  · Plan to have someone take you home from the hospital or clinic.  What happens during the procedure?  · To reduce your risk of infection:  ¨ Your health care team will wash or sanitize their hands.  ¨ Your skin will be washed with soap.  · An IV tube will be inserted into one of your veins.  · You will be given one or more of the following:  ¨ A medicine to help you relax (sedative).  ¨ A medicine to numb the neck area (local anesthetic).  ¨ A medicine to make you fall asleep (general anesthetic).  · A needle will be used to make a small hole in a vein in the right side of your neck. Needles and long, thin, flexible tubes (catheters) will be moved through the hole down to the veins in your liver.  · Dye will be injected through a catheter, and X-ray images will be taken. This will help your surgeon see the blood flow around your liver. As the dye passes through the blood vessels in your body, you may experience a warm feeling.  · A needle will be inserted across the liver to make a connection with a branch of the portal vein. This channel will be expanded, and the shunt will be inserted and left in place.  · The needle and catheter will be removed.  · Pressure will be applied to your neck to prevent bleeding. A bandage (dressing) will be applied.  The procedure may vary among health care providers and hospitals.  What happens after the procedure?  · Your blood pressure, heart rate, breathing rate, and blood oxygen level will be monitored until the medicines you were given have worn off.  · Your health care provider will watch for signs of complications.  · You will be instructed to keep your head raised (elevated) above the level of your heart for a few hours after the procedure. This will help to prevent bleeding from the insertion site in your neck.  · You may feel mild stiffness in your neck where the needle was inserted.  · An ultrasound may be done the morning after the procedure to  make sure that the shunt is open and working well.  · Do not drive for 24 hours if you were given a sedative.  This information is not intended to replace advice given to you by your health care provider. Make sure you discuss any questions you have with your health care provider.  Document Released: 09/15/2004 Document Revised: 09/29/2017 Document Reviewed: 09/29/2017  [employer] Interactive Patient Education © 2017 [employer].      Gastrointestinal Bleeding  Gastrointestinal (GI) bleeding is bleeding somewhere along the digestive tract, between the mouth and anus. This can be caused by various problems. The severity of these problems can range from mild to serious or even life-threatening. If you have GI bleeding, you may find blood in your stools (feces), you may have black stools, or you may vomit blood. If there is a lot of bleeding, you may need to stay in the hospital.  What are the causes?  This condition may be caused by:  · Esophagitis. This is inflammation, irritation, or swelling of the esophagus.  · Hemorrhoids. These are swollen veins in the rectum.  · Anal fissures. These are areas of painful tearing that are often caused by passing hard stool.  · Diverticulosis. These are pouches that form on the colon over time, with age, and may bleed a lot.  · Diverticulitis. This is inflammation in areas with diverticulosis. It can cause pain, fever, and bloody stools, although bleeding may be mild.  · Polyps and cancer. Colon cancer often starts out as precancerous polyps.  · Gastritis and ulcers. With these, bleeding may come from the upper GI tract, near the stomach.  What are the signs or symptoms?  Symptoms of this condition may include:  · Bright red blood in your vomit, or vomit that looks like coffee grounds.  · Bloody, black, or tarry stools.  ¨ Bleeding from the lower GI tract will usually cause red or maroon blood in the stools.  ¨ Bleeding from the upper GI tract may cause black, tarry, often  bad-smelling stools.  ¨ In certain cases, if the bleeding is fast enough, the stools may be red.  · Pain or cramping in the abdomen.  How is this diagnosed?  This condition may be diagnosed based on:  · Medical history and physical exam.  · Various tests, such as:  ¨ Blood tests.  ¨ X-rays and other imaging tests.  ¨ Esophagogastroduodenoscopy (EGD). In this test, a flexible, lighted tube is used to look at your esophagus, stomach, and small intestine.  ¨ Colonoscopy. In this test, a flexible, lighted tube is used to look at your colon.  How is this treated?  Treatment for this condition depends on the cause of the bleeding. For example:  · For bleeding from the esophagus, stomach, small intestine, or colon, the health care provider doing your EGD or colonoscopy may be able to stop the bleeding as part of the procedure.  · Inflammation or infection of the colon can be treated with medicines.  · Certain rectal problems can be treated with creams, suppositories, or warm baths.  · Surgery is sometimes needed.  · Blood transfusions are sometimes needed if a lot of blood has been lost.  If bleeding is slow, you may be allowed to go home. If there is a lot of bleeding, you will need to stay in the hospital for observation.  Follow these instructions at home:  · Take over-the-counter and prescription medicines only as told by your health care provider.  · Eat foods that are high in fiber. This will help to keep your stools soft. These foods include whole grains, legumes, fruits, and vegetables. Eating 1-3 prunes each day works well for many people.  · Drink enough fluid to keep your urine clear or pale yellow.  · Keep all follow-up visits as told by your health care provider. This is important.  Contact a health care provider if:  · Your symptoms do not improve.  Get help right away if:  · Your bleeding increases.  · You feel light-headed or you faint.  · You feel weak.  · You have severe cramps in your back or  abdomen.  · You pass large blood clots in your stool.  · Your symptoms are getting worse.  This information is not intended to replace advice given to you by your health care provider. Make sure you discuss any questions you have with your health care provider.  Document Released: 12/15/2001 Document Revised: 05/17/2017 Document Reviewed: 06/06/2016  [employer] Interactive Patient Education © 2017 [employer]      Esophageal Varices  Introduction  The esophagus is the passage that connects the throat to the stomach. Esophageal varices are blood vessels in the esophagus that have become enlarged. They develop when extra blood is forced to flow through them because the blood's normal pathway is blocked. Without treatment these blood vessels eventually break and bleed (hemorrhage). A hemorrhage is life-threatening.  What are the causes?  This condition may be caused by:  · Scarring of the liver (cirrhosis) due to alcoholism. This is the most common cause.  · Liver disease.  · Severe heart failure.  · A blood clot in the portal vein.  · Sarcoidosis. This is an inflammatory disease that can affect the liver.  · Schistosomiasis. This is a parasitic infection that can cause liver damage.  What are the signs or symptoms?  Usually there are no symptoms unless the esophageal varices bleed. Symptoms of bleeding esophageal varices include:  · Vomiting material that is bright red or that is black and looks like coffee grounds.  · Coughing up blood.  · Black, tarry stools.  · Dizziness or lightheadedness.  · Low blood pressure.  · Loss of consciousness.  How is this diagnosed?  This condition is diagnosed with tests, such as:  · Endoscopy. During this test a thin, lighted tube is inserted through the mouth and into the esophagus.  · Blood tests. These may be done to check liver function, blood counts, and the body's ability to form blood clots.  How is this treated?  This condition may be treated with:  · Medicines that reduce  pressure in the esophageal varices and reduce the risk of bleeding.  · Procedures to reduce pressure in the esophageal varices and reduce the risk of bleeding or stop bleeding. These include:  ¨ Variceal ligation. In this procedure, a rubber band is placed around the esophageal varices to keep them from bleeding.  ¨ Injection therapy. This treatment involves an injection that causes the esophageal varices to shrink and close (sclerotherapy). Medicines that tighten blood vessels or alter blood flow may also be used.  ¨ Balloon tamponade. In this procedure, a tube is put into the esophagus and a balloon is passed through it and inflated.  ¨ Transjugular intrahepatic portosystemic shunt (TIPS) placement. In this procedure, a small tube is placed within the liver veins. This decreases blood flow and pressure to the esophageal varices.  · A liver transplant. This may be done if other treatments do not work.  Follow these instructions at home:  · Take medicines only as directed by your health care provider.  · Follow your health care provider's instructions about rest and physical activity.  Get help right away if:  · You have any symptoms of this condition after treatment.  · You are unable to eat or drink.  · You have chest pain.  This information is not intended to replace advice given to you by your health care provider. Make sure you discuss any questions you have with your health care provider.  Document Released: 03/09/2005 Document Revised: 05/25/2017 Document Reviewed: 12/14/2015  © 2017 Elsevier

## 2018-03-15 NOTE — CARE PLAN
Problem: Pain Management  Goal: Pain level will decrease to patient's comfort goal  Outcome: PROGRESSING AS EXPECTED  PRN fentanyl as ordered, will reassess pain frequently

## 2018-03-15 NOTE — PROGRESS NOTES
Patient transferred to the floor from [facility], placed on tele box, and monitor room notified. Patient has no complaints of pain and no indications of distress. Bed in the lowest position and call light and personal belongings within reach.

## 2018-03-15 NOTE — PROGRESS NOTES
2 RN skin check: Patient's generalized skin is intact no indications of skin breakdown and bilateral ears are red but blanching.

## 2018-03-15 NOTE — CARE PLAN
Problem: Safety  Goal: Will remain free from injury  Outcome: PROGRESSING AS EXPECTED  Pt. Remains alert and oriented, rings for assistance, treaded slippers on

## 2018-03-15 NOTE — PROGRESS NOTES
Patient alert and oriented for bedside shift report. Wanted to get up and walk and ambulated in the alas. Appears comfortable and stable at this time. Smiling and walking with ease. Will continue to monitor post TIPS procedure and assess for further signs of bleeding.

## 2018-03-15 NOTE — PROGRESS NOTES
Patient has been discharged. IV removed intact. Family at bedside for discharge teaching. Per [facility]. Patient states that he was unable to see GI consultants because its not covered by his insurance. Patient and Family stated being [facility]. No pain, stable Vs, ambulating ad muriel. No issues at time of discharge. Hard script for Omeprazole given. No further issues. Patient ambulated off unit with Family at 16:45.

## 2018-03-15 NOTE — PROGRESS NOTES
MARY ANN to follow up on patient care. Received return page from [facility] gold who is addressing the issue.

## 2018-03-15 NOTE — PROGRESS NOTES
Page to [facility] as the patient has not been seen yet today and he has questions regarding discharge and follow up care. Waiting on return page.

## 2018-03-16 ENCOUNTER — PATIENT OUTREACH (OUTPATIENT)
Dept: HEALTH INFORMATION MANAGEMENT | Facility: OTHER | Age: 28
End: 2018-03-16

## 2018-03-20 ENCOUNTER — PATIENT OUTREACH (OUTPATIENT)
Dept: HEALTH INFORMATION MANAGEMENT | Facility: OTHER | Age: 28
End: 2018-03-20

## 2018-03-20 DIAGNOSIS — K92.2 GASTROINTESTINAL HEMORRHAGE, UNSPECIFIED GASTROINTESTINAL HEMORRHAGE TYPE: ICD-10-CM

## 2018-03-22 ENCOUNTER — PATIENT OUTREACH (OUTPATIENT)
Dept: HEALTH INFORMATION MANAGEMENT | Facility: OTHER | Age: 28
End: 2018-03-22

## 2018-03-22 NOTE — PROGRESS NOTES
Referral received from Nela Mosquera RN CC d/c caller for CC services for being readmitted to the ED due to a GI bleed. The patient was not able to see GI specialist due to insurance.   Outreach call to the patient.  Left  with contact info requesting a call back.

## 2018-03-26 ENCOUNTER — PATIENT OUTREACH (OUTPATIENT)
Dept: HEALTH INFORMATION MANAGEMENT | Facility: OTHER | Age: 28
End: 2018-03-26

## 2018-03-30 DIAGNOSIS — I85.01 BLEEDING ESOPHAGEAL VARICES, UNSPECIFIED ESOPHAGEAL VARICES TYPE (HCC): ICD-10-CM

## 2018-04-13 ENCOUNTER — PATIENT OUTREACH (OUTPATIENT)
Dept: HEALTH INFORMATION MANAGEMENT | Facility: OTHER | Age: 28
End: 2018-04-13

## 2018-04-13 NOTE — LETTER
April 13, 2018        August Samuel  [address]  [place] [address]        Dear August:    Welcome to [facility] Care Coordination! Your team of Registered Nurses, Social Workers, and Pharmacists are partnered with your [facility] Providers to assist you with accessing resources and education to support your individual needs. As you work with your Care Coordination Team, you will be empowered to be successful with learning how to self-manage your health with the Patient Centered goals of helping you to feel better and staying out of the hospital. This program is at no cost to you as this is a part of [facility]’s ongoing commitment to serve the people of our community.  Benefits of working with your Care Coordination Team includes:  - Comprehensive assessment by a Registered Nurse on the telephone to identify your medical and health needs.   - Telephonic review of your medications by a Care Coordination Pharmacist to answer any questions you may have about your medications.  - Evaluation of social needs, such as, transportation; financial; food; housing; etc. by a Care Coordination  to connect you with eligible resources.  - For those eligible, we will connect you with the [facility], offering access to services to assist you to manage your Congestive Heart Failure or Chronic Obstructive Pulmonary Disease in your own home.    Please contact me at [phone] to start on the road to your Care Coordination services.       I am available 5 days a week, Monday through Friday from 8:00 a.m. - 5:00 p.m.  I look forward to speaking with you soon.    If you have any questions or concerns, please don't hesitate to call.        Sincerely,        Nicci Michele R.N. Care Coordinator    Electronically Signed

## 2018-04-13 NOTE — PROGRESS NOTES
Not able to reach the patient.  Pt to reach out to CC RN if interested in CC program. Contact information has been provided to pt via VM and letter. Plan: CCRN to remove self from care team and resolve episode of care.

## 2019-03-12 ENCOUNTER — HOSPITAL ENCOUNTER (INPATIENT)
Facility: MEDICAL CENTER | Age: 29
LOS: 1 days | DRG: 896 | End: 2019-03-13
Attending: EMERGENCY MEDICINE | Admitting: INTERNAL MEDICINE
Payer: COMMERCIAL

## 2019-03-12 DIAGNOSIS — F10.939 ALCOHOL WITHDRAWAL SYNDROME WITH COMPLICATION (HCC): ICD-10-CM

## 2019-03-12 DIAGNOSIS — K92.1 MELENA: ICD-10-CM

## 2019-03-12 LAB
ALBUMIN SERPL BCP-MCNC: 4.2 G/DL (ref 3.2–4.9)
ALBUMIN/GLOB SERPL: 1 G/DL
ALP SERPL-CCNC: 156 U/L (ref 30–99)
ALT SERPL-CCNC: 57 U/L (ref 2–50)
ANION GAP SERPL CALC-SCNC: 9 MMOL/L (ref 0–11.9)
ANISOCYTOSIS BLD QL SMEAR: ABNORMAL
AST SERPL-CCNC: 78 U/L (ref 12–45)
BASOPHILS # BLD AUTO: 1 % (ref 0–1.8)
BASOPHILS # BLD: 0.07 K/UL (ref 0–0.12)
BILIRUB SERPL-MCNC: 3.8 MG/DL (ref 0.1–1.5)
BUN SERPL-MCNC: 8 MG/DL (ref 8–22)
CALCIUM SERPL-MCNC: 9.8 MG/DL (ref 8.5–10.5)
CHLORIDE SERPL-SCNC: 105 MMOL/L (ref 96–112)
CO2 SERPL-SCNC: 22 MMOL/L (ref 20–33)
COMMENT 1642: NORMAL
CREAT SERPL-MCNC: 0.78 MG/DL (ref 0.5–1.4)
EOSINOPHIL # BLD AUTO: 0.13 K/UL (ref 0–0.51)
EOSINOPHIL NFR BLD: 1.8 % (ref 0–6.9)
ERYTHROCYTE [DISTWIDTH] IN BLOOD BY AUTOMATED COUNT: 55.8 FL (ref 35.9–50)
GIANT PLATELETS BLD QL SMEAR: NORMAL
GLOBULIN SER CALC-MCNC: 4.2 G/DL (ref 1.9–3.5)
GLUCOSE SERPL-MCNC: 131 MG/DL (ref 65–99)
HCT VFR BLD AUTO: 50.3 % (ref 42–52)
HGB BLD-MCNC: 16.4 G/DL (ref 14–18)
IMM GRANULOCYTES # BLD AUTO: 0.02 K/UL (ref 0–0.11)
IMM GRANULOCYTES NFR BLD AUTO: 0.3 % (ref 0–0.9)
LG PLATELETS BLD QL SMEAR: NORMAL
LIPASE SERPL-CCNC: 15 U/L (ref 11–82)
LYMPHOCYTES # BLD AUTO: 2.86 K/UL (ref 1–4.8)
LYMPHOCYTES NFR BLD: 39.4 % (ref 22–41)
MAGNESIUM SERPL-MCNC: 1.6 MG/DL (ref 1.5–2.5)
MCH RBC QN AUTO: 27.2 PG (ref 27–33)
MCHC RBC AUTO-ENTMCNC: 32.6 G/DL (ref 33.7–35.3)
MCV RBC AUTO: 83.6 FL (ref 81.4–97.8)
MONOCYTES # BLD AUTO: 0.43 K/UL (ref 0–0.85)
MONOCYTES NFR BLD AUTO: 5.9 % (ref 0–13.4)
MORPHOLOGY BLD-IMP: NORMAL
NEUTROPHILS # BLD AUTO: 3.75 K/UL (ref 1.82–7.42)
NEUTROPHILS NFR BLD: 51.6 % (ref 44–72)
NRBC # BLD AUTO: 0 K/UL
NRBC BLD-RTO: 0 /100 WBC
PLATELET # BLD AUTO: 178 K/UL (ref 164–446)
PLATELET BLD QL SMEAR: NORMAL
POIKILOCYTOSIS BLD QL SMEAR: NORMAL
POTASSIUM SERPL-SCNC: 3.8 MMOL/L (ref 3.6–5.5)
PROT SERPL-MCNC: 8.4 G/DL (ref 6–8.2)
RBC # BLD AUTO: 6.02 M/UL (ref 4.7–6.1)
RBC BLD AUTO: PRESENT
SODIUM SERPL-SCNC: 136 MMOL/L (ref 135–145)
VARIANT LYMPHS BLD QL SMEAR: NORMAL
WBC # BLD AUTO: 7.3 K/UL (ref 4.8–10.8)

## 2019-03-12 PROCEDURE — 83735 ASSAY OF MAGNESIUM: CPT

## 2019-03-12 PROCEDURE — HZ2ZZZZ DETOXIFICATION SERVICES FOR SUBSTANCE ABUSE TREATMENT: ICD-10-PCS | Performed by: INTERNAL MEDICINE

## 2019-03-12 PROCEDURE — 96368 THER/DIAG CONCURRENT INF: CPT

## 2019-03-12 PROCEDURE — 83036 HEMOGLOBIN GLYCOSYLATED A1C: CPT

## 2019-03-12 PROCEDURE — 96365 THER/PROPH/DIAG IV INF INIT: CPT

## 2019-03-12 PROCEDURE — 36415 COLL VENOUS BLD VENIPUNCTURE: CPT

## 2019-03-12 PROCEDURE — 99223 1ST HOSP IP/OBS HIGH 75: CPT | Mod: GC | Performed by: INTERNAL MEDICINE

## 2019-03-12 PROCEDURE — C9113 INJ PANTOPRAZOLE SODIUM, VIA: HCPCS | Performed by: EMERGENCY MEDICINE

## 2019-03-12 PROCEDURE — 96375 TX/PRO/DX INJ NEW DRUG ADDON: CPT

## 2019-03-12 PROCEDURE — 99285 EMERGENCY DEPT VISIT HI MDM: CPT

## 2019-03-12 PROCEDURE — C9113 INJ PANTOPRAZOLE SODIUM, VIA: HCPCS | Performed by: STUDENT IN AN ORGANIZED HEALTH CARE EDUCATION/TRAINING PROGRAM

## 2019-03-12 PROCEDURE — 85025 COMPLETE CBC W/AUTO DIFF WBC: CPT

## 2019-03-12 PROCEDURE — 770020 HCHG ROOM/CARE - TELE (206)

## 2019-03-12 PROCEDURE — 700105 HCHG RX REV CODE 258: Performed by: STUDENT IN AN ORGANIZED HEALTH CARE EDUCATION/TRAINING PROGRAM

## 2019-03-12 PROCEDURE — 700111 HCHG RX REV CODE 636 W/ 250 OVERRIDE (IP): Performed by: EMERGENCY MEDICINE

## 2019-03-12 PROCEDURE — 80053 COMPREHEN METABOLIC PANEL: CPT

## 2019-03-12 PROCEDURE — 700111 HCHG RX REV CODE 636 W/ 250 OVERRIDE (IP): Performed by: STUDENT IN AN ORGANIZED HEALTH CARE EDUCATION/TRAINING PROGRAM

## 2019-03-12 PROCEDURE — 83690 ASSAY OF LIPASE: CPT

## 2019-03-12 PROCEDURE — 700105 HCHG RX REV CODE 258: Performed by: EMERGENCY MEDICINE

## 2019-03-12 RX ORDER — LORAZEPAM 2 MG/1
4 TABLET ORAL
Status: DISCONTINUED | OUTPATIENT
Start: 2019-03-12 | End: 2019-03-13

## 2019-03-12 RX ORDER — LORAZEPAM 1 MG/1
1 TABLET ORAL EVERY 4 HOURS PRN
Status: DISCONTINUED | OUTPATIENT
Start: 2019-03-12 | End: 2019-03-13

## 2019-03-12 RX ORDER — BISACODYL 10 MG
10 SUPPOSITORY, RECTAL RECTAL
Status: DISCONTINUED | OUTPATIENT
Start: 2019-03-12 | End: 2019-03-13 | Stop reason: HOSPADM

## 2019-03-12 RX ORDER — AMOXICILLIN 250 MG
2 CAPSULE ORAL 2 TIMES DAILY
Status: DISCONTINUED | OUTPATIENT
Start: 2019-03-12 | End: 2019-03-13 | Stop reason: HOSPADM

## 2019-03-12 RX ORDER — HYDRALAZINE HYDROCHLORIDE 20 MG/ML
10 INJECTION INTRAMUSCULAR; INTRAVENOUS EVERY 4 HOURS PRN
Status: DISCONTINUED | OUTPATIENT
Start: 2019-03-12 | End: 2019-03-13 | Stop reason: HOSPADM

## 2019-03-12 RX ORDER — OCTREOTIDE ACETATE 100 UG/ML
50 INJECTION, SOLUTION INTRAVENOUS; SUBCUTANEOUS ONCE
Status: COMPLETED | OUTPATIENT
Start: 2019-03-12 | End: 2019-03-12

## 2019-03-12 RX ORDER — POLYETHYLENE GLYCOL 3350 17 G/17G
1 POWDER, FOR SOLUTION ORAL
Status: DISCONTINUED | OUTPATIENT
Start: 2019-03-12 | End: 2019-03-13 | Stop reason: HOSPADM

## 2019-03-12 RX ORDER — SODIUM CHLORIDE 9 MG/ML
INJECTION, SOLUTION INTRAVENOUS CONTINUOUS
Status: DISCONTINUED | OUTPATIENT
Start: 2019-03-12 | End: 2019-03-13

## 2019-03-12 RX ORDER — LORAZEPAM 2 MG/1
2 TABLET ORAL
Status: DISCONTINUED | OUTPATIENT
Start: 2019-03-12 | End: 2019-03-13

## 2019-03-12 RX ORDER — OMEPRAZOLE 20 MG/1
20 CAPSULE, DELAYED RELEASE ORAL 2 TIMES DAILY
Status: DISCONTINUED | OUTPATIENT
Start: 2019-03-12 | End: 2019-03-12

## 2019-03-12 RX ORDER — THIAMINE MONONITRATE (VIT B1) 100 MG
100 TABLET ORAL DAILY
Status: DISCONTINUED | OUTPATIENT
Start: 2019-03-13 | End: 2019-03-13 | Stop reason: HOSPADM

## 2019-03-12 RX ORDER — ONDANSETRON 2 MG/ML
4 INJECTION INTRAMUSCULAR; INTRAVENOUS EVERY 4 HOURS PRN
Status: DISCONTINUED | OUTPATIENT
Start: 2019-03-12 | End: 2019-03-13 | Stop reason: HOSPADM

## 2019-03-12 RX ORDER — FOLIC ACID 1 MG/1
1 TABLET ORAL DAILY
Status: DISCONTINUED | OUTPATIENT
Start: 2019-03-13 | End: 2019-03-13 | Stop reason: HOSPADM

## 2019-03-12 RX ORDER — ACETAMINOPHEN 325 MG/1
650 TABLET ORAL EVERY 6 HOURS PRN
Status: DISCONTINUED | OUTPATIENT
Start: 2019-03-12 | End: 2019-03-13 | Stop reason: HOSPADM

## 2019-03-12 RX ORDER — LORAZEPAM 2 MG/ML
1 INJECTION INTRAMUSCULAR
Status: DISCONTINUED | OUTPATIENT
Start: 2019-03-12 | End: 2019-03-13

## 2019-03-12 RX ORDER — PROMETHAZINE HYDROCHLORIDE 25 MG/1
12.5-25 TABLET ORAL EVERY 4 HOURS PRN
Status: DISCONTINUED | OUTPATIENT
Start: 2019-03-12 | End: 2019-03-13 | Stop reason: HOSPADM

## 2019-03-12 RX ORDER — LORAZEPAM 2 MG/ML
0.5 INJECTION INTRAMUSCULAR EVERY 4 HOURS PRN
Status: DISCONTINUED | OUTPATIENT
Start: 2019-03-12 | End: 2019-03-13

## 2019-03-12 RX ORDER — LORAZEPAM 2 MG/ML
1.5 INJECTION INTRAMUSCULAR
Status: DISCONTINUED | OUTPATIENT
Start: 2019-03-12 | End: 2019-03-13

## 2019-03-12 RX ORDER — ONDANSETRON 4 MG/1
4 TABLET, ORALLY DISINTEGRATING ORAL EVERY 4 HOURS PRN
Status: DISCONTINUED | OUTPATIENT
Start: 2019-03-12 | End: 2019-03-13 | Stop reason: HOSPADM

## 2019-03-12 RX ORDER — LORAZEPAM 1 MG/1
0.5 TABLET ORAL EVERY 4 HOURS PRN
Status: DISCONTINUED | OUTPATIENT
Start: 2019-03-12 | End: 2019-03-13

## 2019-03-12 RX ORDER — PROMETHAZINE HYDROCHLORIDE 25 MG/1
12.5-25 SUPPOSITORY RECTAL EVERY 4 HOURS PRN
Status: DISCONTINUED | OUTPATIENT
Start: 2019-03-12 | End: 2019-03-13 | Stop reason: HOSPADM

## 2019-03-12 RX ORDER — LORAZEPAM 2 MG/ML
2 INJECTION INTRAMUSCULAR
Status: DISCONTINUED | OUTPATIENT
Start: 2019-03-12 | End: 2019-03-13

## 2019-03-12 RX ADMIN — OCTREOTIDE ACETATE 50 MCG: 100 INJECTION, SOLUTION INTRAVENOUS; SUBCUTANEOUS at 22:30

## 2019-03-12 RX ADMIN — SODIUM CHLORIDE 8 MG/HR: 9 INJECTION, SOLUTION INTRAVENOUS at 23:42

## 2019-03-12 RX ADMIN — SODIUM CHLORIDE: 9 INJECTION, SOLUTION INTRAVENOUS at 22:33

## 2019-03-12 RX ADMIN — OCTREOTIDE ACETATE 50 MCG/HR: 200 INJECTION, SOLUTION INTRAVENOUS; SUBCUTANEOUS at 23:42

## 2019-03-12 RX ADMIN — ONDANSETRON 4 MG: 2 INJECTION INTRAMUSCULAR; INTRAVENOUS at 22:39

## 2019-03-12 RX ADMIN — SODIUM CHLORIDE 80 MG: 9 INJECTION, SOLUTION INTRAVENOUS at 22:29

## 2019-03-12 ASSESSMENT — LIFESTYLE VARIABLES
TOTAL SCORE: 4
HAVE PEOPLE ANNOYED YOU BY CRITICIZING YOUR DRINKING: YES
TOTAL SCORE: 4
SUBSTANCE_ABUSE: 1
EVER HAD A DRINK FIRST THING IN THE MORNING TO STEADY YOUR NERVES TO GET RID OF A HANGOVER: YES
DO YOU DRINK ALCOHOL: YES
CONSUMPTION TOTAL: INCOMPLETE
TOTAL SCORE: 4
HAVE YOU EVER FELT YOU SHOULD CUT DOWN ON YOUR DRINKING: YES
EVER FELT BAD OR GUILTY ABOUT YOUR DRINKING: YES

## 2019-03-12 ASSESSMENT — ENCOUNTER SYMPTOMS
DIZZINESS: 1
BLOOD IN STOOL: 0
HEARTBURN: 0
NAUSEA: 1
CONSTITUTIONAL NEGATIVE: 1
VOMITING: 0
ABDOMINAL PAIN: 1
RESPIRATORY NEGATIVE: 1
DEPRESSION: 0
CARDIOVASCULAR NEGATIVE: 1
MUSCULOSKELETAL NEGATIVE: 1
EYES NEGATIVE: 1
LOSS OF CONSCIOUSNESS: 0
HEADACHES: 1
CONSTIPATION: 0
SEIZURES: 0
DIARRHEA: 0

## 2019-03-12 NOTE — ED TRIAGE NOTES
"Chief Complaint   Patient presents with   • Headache     x 2 days/ posterior headache. pt denies hx.    • Melena     Pt reports black stool x 1 today/ pt reports hx of cirrhosis. pt describes LLQ to be \"rumbly\"   • Nausea     Explained to pt triage process, made pt aware to tell this RN/staff of any changes/concerns, pt verbalized understanding of process and instructions given. Pt to ER shirley.    "

## 2019-03-12 NOTE — ED NOTES
Rounded on patient, patient relapsed on alcohol, binged over the weekend.  Noted labs, patient vitals taken.  Updated on status.  Apologized for the wait.  Will monitor.

## 2019-03-13 ENCOUNTER — APPOINTMENT (OUTPATIENT)
Dept: RADIOLOGY | Facility: MEDICAL CENTER | Age: 29
DRG: 896 | End: 2019-03-13
Attending: STUDENT IN AN ORGANIZED HEALTH CARE EDUCATION/TRAINING PROGRAM
Payer: COMMERCIAL

## 2019-03-13 VITALS
BODY MASS INDEX: 30.56 KG/M2 | WEIGHT: 218.26 LBS | HEIGHT: 71 IN | DIASTOLIC BLOOD PRESSURE: 85 MMHG | TEMPERATURE: 97.4 F | RESPIRATION RATE: 14 BRPM | HEART RATE: 63 BPM | OXYGEN SATURATION: 99 % | SYSTOLIC BLOOD PRESSURE: 146 MMHG

## 2019-03-13 PROBLEM — Z95.828 S/P TIPS (TRANSJUGULAR INTRAHEPATIC PORTOSYSTEMIC SHUNT): Status: ACTIVE | Noted: 2019-03-13

## 2019-03-13 LAB
ALBUMIN SERPL BCP-MCNC: 3.8 G/DL (ref 3.2–4.9)
ALBUMIN/GLOB SERPL: 1.2 G/DL
ALP SERPL-CCNC: 139 U/L (ref 30–99)
ALT SERPL-CCNC: 44 U/L (ref 2–50)
ANION GAP SERPL CALC-SCNC: 13 MMOL/L (ref 0–11.9)
AST SERPL-CCNC: 51 U/L (ref 12–45)
BASOPHILS # BLD AUTO: 0.9 % (ref 0–1.8)
BASOPHILS # BLD: 0.07 K/UL (ref 0–0.12)
BILIRUB SERPL-MCNC: 3.1 MG/DL (ref 0.1–1.5)
BUN SERPL-MCNC: 12 MG/DL (ref 8–22)
CALCIUM SERPL-MCNC: 9 MG/DL (ref 8.5–10.5)
CHLORIDE SERPL-SCNC: 107 MMOL/L (ref 96–112)
CO2 SERPL-SCNC: 21 MMOL/L (ref 20–33)
CREAT SERPL-MCNC: 0.79 MG/DL (ref 0.5–1.4)
EOSINOPHIL # BLD AUTO: 0.2 K/UL (ref 0–0.51)
EOSINOPHIL NFR BLD: 2.4 % (ref 0–6.9)
ERYTHROCYTE [DISTWIDTH] IN BLOOD BY AUTOMATED COUNT: 56.8 FL (ref 35.9–50)
EST. AVERAGE GLUCOSE BLD GHB EST-MCNC: 143 MG/DL
GLOBULIN SER CALC-MCNC: 3.3 G/DL (ref 1.9–3.5)
GLUCOSE SERPL-MCNC: 87 MG/DL (ref 65–99)
HBA1C MFR BLD: 6.6 % (ref 0–5.6)
HCT VFR BLD AUTO: 44.6 % (ref 42–52)
HCT VFR BLD AUTO: 44.8 % (ref 42–52)
HGB BLD-MCNC: 14.2 G/DL (ref 14–18)
HGB BLD-MCNC: 14.3 G/DL (ref 14–18)
IMM GRANULOCYTES # BLD AUTO: 0.01 K/UL (ref 0–0.11)
IMM GRANULOCYTES NFR BLD AUTO: 0.1 % (ref 0–0.9)
LYMPHOCYTES # BLD AUTO: 3.5 K/UL (ref 1–4.8)
LYMPHOCYTES NFR BLD: 42.6 % (ref 22–41)
MCH RBC QN AUTO: 27 PG (ref 27–33)
MCHC RBC AUTO-ENTMCNC: 31.7 G/DL (ref 33.7–35.3)
MCV RBC AUTO: 85.3 FL (ref 81.4–97.8)
MONOCYTES # BLD AUTO: 0.49 K/UL (ref 0–0.85)
MONOCYTES NFR BLD AUTO: 6 % (ref 0–13.4)
NEUTROPHILS # BLD AUTO: 3.95 K/UL (ref 1.82–7.42)
NEUTROPHILS NFR BLD: 48 % (ref 44–72)
NRBC # BLD AUTO: 0 K/UL
NRBC BLD-RTO: 0 /100 WBC
PLATELET # BLD AUTO: 145 K/UL (ref 164–446)
PMV BLD AUTO: 12.7 FL (ref 9–12.9)
POTASSIUM SERPL-SCNC: 3.8 MMOL/L (ref 3.6–5.5)
PROT SERPL-MCNC: 7.1 G/DL (ref 6–8.2)
RBC # BLD AUTO: 5.25 M/UL (ref 4.7–6.1)
SODIUM SERPL-SCNC: 141 MMOL/L (ref 135–145)
WBC # BLD AUTO: 8.2 K/UL (ref 4.8–10.8)

## 2019-03-13 PROCEDURE — 85014 HEMATOCRIT: CPT

## 2019-03-13 PROCEDURE — 76705 ECHO EXAM OF ABDOMEN: CPT

## 2019-03-13 PROCEDURE — 85025 COMPLETE CBC W/AUTO DIFF WBC: CPT

## 2019-03-13 PROCEDURE — C9113 INJ PANTOPRAZOLE SODIUM, VIA: HCPCS | Performed by: STUDENT IN AN ORGANIZED HEALTH CARE EDUCATION/TRAINING PROGRAM

## 2019-03-13 PROCEDURE — 700105 HCHG RX REV CODE 258: Performed by: STUDENT IN AN ORGANIZED HEALTH CARE EDUCATION/TRAINING PROGRAM

## 2019-03-13 PROCEDURE — 700111 HCHG RX REV CODE 636 W/ 250 OVERRIDE (IP): Performed by: STUDENT IN AN ORGANIZED HEALTH CARE EDUCATION/TRAINING PROGRAM

## 2019-03-13 PROCEDURE — 700102 HCHG RX REV CODE 250 W/ 637 OVERRIDE(OP): Performed by: STUDENT IN AN ORGANIZED HEALTH CARE EDUCATION/TRAINING PROGRAM

## 2019-03-13 PROCEDURE — 36415 COLL VENOUS BLD VENIPUNCTURE: CPT

## 2019-03-13 PROCEDURE — 96366 THER/PROPH/DIAG IV INF ADDON: CPT

## 2019-03-13 PROCEDURE — 99238 HOSP IP/OBS DSCHRG MGMT 30/<: CPT | Mod: GC | Performed by: INTERNAL MEDICINE

## 2019-03-13 PROCEDURE — 80053 COMPREHEN METABOLIC PANEL: CPT

## 2019-03-13 PROCEDURE — 85018 HEMOGLOBIN: CPT

## 2019-03-13 PROCEDURE — A9270 NON-COVERED ITEM OR SERVICE: HCPCS | Performed by: STUDENT IN AN ORGANIZED HEALTH CARE EDUCATION/TRAINING PROGRAM

## 2019-03-13 RX ORDER — OMEPRAZOLE 40 MG/1
40 CAPSULE, DELAYED RELEASE ORAL DAILY
Qty: 30 CAP | Refills: 0 | Status: SHIPPED | OUTPATIENT
Start: 2019-03-13 | End: 2019-05-06

## 2019-03-13 RX ADMIN — CEFTRIAXONE SODIUM 1 G: 1 INJECTION, POWDER, FOR SOLUTION INTRAMUSCULAR; INTRAVENOUS at 15:51

## 2019-03-13 RX ADMIN — METFORMIN HYDROCHLORIDE 1000 MG: 500 TABLET, FILM COATED ORAL at 07:31

## 2019-03-13 RX ADMIN — SODIUM CHLORIDE: 9 INJECTION, SOLUTION INTRAVENOUS at 08:54

## 2019-03-13 RX ADMIN — Medication 100 MG: at 06:43

## 2019-03-13 RX ADMIN — FOLIC ACID 1 MG: 1 TABLET ORAL at 06:43

## 2019-03-13 RX ADMIN — THERA TABS 1 TABLET: TAB at 06:43

## 2019-03-13 RX ADMIN — SODIUM CHLORIDE 8 MG/HR: 9 INJECTION, SOLUTION INTRAVENOUS at 10:42

## 2019-03-13 ASSESSMENT — COGNITIVE AND FUNCTIONAL STATUS - GENERAL
DAILY ACTIVITIY SCORE: 24
SUGGESTED CMS G CODE MODIFIER MOBILITY: CH
MOBILITY SCORE: 24
SUGGESTED CMS G CODE MODIFIER DAILY ACTIVITY: CH

## 2019-03-13 ASSESSMENT — LIFESTYLE VARIABLES
ALCOHOL_USE: YES
ORIENTATION AND CLOUDING OF SENSORIUM: ORIENTED AND CAN DO SERIAL ADDITIONS
TOTAL SCORE: 0
TOTAL SCORE: 0
HAVE PEOPLE ANNOYED YOU BY CRITICIZING YOUR DRINKING: YES
HOW MANY TIMES IN THE PAST YEAR HAVE YOU HAD 5 OR MORE DRINKS IN A DAY: 3
TREMOR: NO TREMOR
ORIENTATION AND CLOUDING OF SENSORIUM: ORIENTED AND CAN DO SERIAL ADDITIONS
ANXIETY: NO ANXIETY (AT EASE)
NAUSEA AND VOMITING: NO NAUSEA AND NO VOMITING
HEADACHE, FULLNESS IN HEAD: NOT PRESENT
HEADACHE, FULLNESS IN HEAD: NOT PRESENT
EVER HAD A DRINK FIRST THING IN THE MORNING TO STEADY YOUR NERVES TO GET RID OF A HANGOVER: YES
AGITATION: NORMAL ACTIVITY
HEADACHE, FULLNESS IN HEAD: NOT PRESENT
TOTAL SCORE: 4
ANXIETY: NO ANXIETY (AT EASE)
TREMOR: NO TREMOR
AUDITORY DISTURBANCES: NOT PRESENT
ANXIETY: NO ANXIETY (AT EASE)
AGITATION: NORMAL ACTIVITY
DOES PATIENT WANT TO STOP DRINKING: YES
NAUSEA AND VOMITING: NO NAUSEA AND NO VOMITING
ORIENTATION AND CLOUDING OF SENSORIUM: ORIENTED AND CAN DO SERIAL ADDITIONS
CONSUMPTION TOTAL: INCOMPLETE
CONSUMPTION TOTAL: POSITIVE
HAVE YOU EVER FELT YOU SHOULD CUT DOWN ON YOUR DRINKING: YES
AVERAGE NUMBER OF DAYS PER WEEK YOU HAVE A DRINK CONTAINING ALCOHOL: 1
DOES PATIENT WANT TO TALK TO SOMEONE ABOUT QUITTING: YES
TOTAL SCORE: 0
AGITATION: NORMAL ACTIVITY
ORIENTATION AND CLOUDING OF SENSORIUM: ORIENTED AND CAN DO SERIAL ADDITIONS
PAROXYSMAL SWEATS: NO SWEAT VISIBLE
TOTAL SCORE: 4
AGITATION: NORMAL ACTIVITY
AUDITORY DISTURBANCES: NOT PRESENT
TREMOR: NO TREMOR
EVER FELT BAD OR GUILTY ABOUT YOUR DRINKING: YES
PAROXYSMAL SWEATS: NO SWEAT VISIBLE
NAUSEA AND VOMITING: NO NAUSEA AND NO VOMITING
TOTAL SCORE: 4
HAVE PEOPLE ANNOYED YOU BY CRITICIZING YOUR DRINKING: YES
DO YOU DRINK ALCOHOL: YES
VISUAL DISTURBANCES: NOT PRESENT
PAROXYSMAL SWEATS: NO SWEAT VISIBLE
TOTAL SCORE: 4
TOTAL SCORE: 4
HAVE YOU EVER FELT YOU SHOULD CUT DOWN ON YOUR DRINKING: YES
NAUSEA AND VOMITING: NO NAUSEA AND NO VOMITING
ON A TYPICAL DAY WHEN YOU DRINK ALCOHOL HOW MANY DRINKS DO YOU HAVE: 5
AUDITORY DISTURBANCES: NOT PRESENT
VISUAL DISTURBANCES: NOT PRESENT
PAROXYSMAL SWEATS: NO SWEAT VISIBLE
TOTAL SCORE: 0
HEADACHE, FULLNESS IN HEAD: NOT PRESENT
EVER FELT BAD OR GUILTY ABOUT YOUR DRINKING: YES
VISUAL DISTURBANCES: NOT PRESENT
ANXIETY: NO ANXIETY (AT EASE)
EVER HAD A DRINK FIRST THING IN THE MORNING TO STEADY YOUR NERVES TO GET RID OF A HANGOVER: YES
TOTAL SCORE: 4
TREMOR: NO TREMOR
AUDITORY DISTURBANCES: NOT PRESENT
VISUAL DISTURBANCES: NOT PRESENT

## 2019-03-13 ASSESSMENT — ENCOUNTER SYMPTOMS
MEMORY LOSS: 1
DIZZINESS: 0
PALPITATIONS: 0
NAUSEA: 0
BRUISES/BLEEDS EASILY: 0
NECK PAIN: 0
FALLS: 0
VOMITING: 0
SORE THROAT: 0
FEVER: 0
HALLUCINATIONS: 0
CHILLS: 0
SHORTNESS OF BREATH: 0
DOUBLE VISION: 0
POLYDIPSIA: 0
HEMOPTYSIS: 0
SEIZURES: 0
LOSS OF CONSCIOUSNESS: 0
BLURRED VISION: 0

## 2019-03-13 ASSESSMENT — PATIENT HEALTH QUESTIONNAIRE - PHQ9
SUM OF ALL RESPONSES TO PHQ9 QUESTIONS 1 AND 2: 0
1. LITTLE INTEREST OR PLEASURE IN DOING THINGS: NOT AT ALL
2. FEELING DOWN, DEPRESSED, IRRITABLE, OR HOPELESS: NOT AT ALL

## 2019-03-13 NOTE — ASSESSMENT & PLAN NOTE
Likely secondary to upper GI bleed from resumption of EtOH consumption.     -Continue PPI drip, octreotide drip and and IV fluids  -GI consulted.

## 2019-03-13 NOTE — ED NOTES
Pt back to [facility] ambulating from triage with RN, pt in gown and on monitor, IV established. Pt consists of having black tarry stools starting Today with noticeable slight jaundice and disorientation to time. Pt has a hx of alcoholism and relapsed this week, also has shx of a TIPPS procedure.

## 2019-03-13 NOTE — ED NOTES
Med rec updated and complete.  Allergies reviewed.  Pt stated that he ran out of his metformin on 03/08/19.  No oral antibiotic use in last 30 days at home.

## 2019-03-13 NOTE — SENIOR ADMIT NOTE
Senior Admission Note    In summary: August Samuel is a 28 y.o. male with past medical history of diabetes mellitus type 2, alcoholic liver cirrhosis (status post esophageal varices with banding, gastroparesis, portal hypertensive gastropathy, TIPS procedure) presented to the ED with 1 episode of melena day of admission. Pt states he relapsed on Friday 3/8 and drank excessively, he has not drank since and today had dark tarry stool. In the ED a CBC/CMP hgb wnl and the stigmata of cirrhosis. Pt as then admitted for further mgmt of his conditions.     Pertinent physical exam findings:    Abd: NTND  Ext: No edema/deformity, old scar from I&D on R shin  Rectal: no gross blood, dark green stool, hemoccult negative    Assessment and plan in summary:    1.Upper GI bleed  2.EtOH Cirrhosis    - Hx of varices  2/2 EtOH cirrhosis    - Hemoccult negative, only one event, Hgb wnl   - Start PPI and octreotide drips   - Trend H/H   - CTM on tele with cardiac monitor    - CIWA and PO vitamins        For full plan, please see Intern note for details   Ash Hanna M.D.  PGY 2

## 2019-03-13 NOTE — PROGRESS NOTES
· 2 RN skin check complete with BECKY Santos.  · Devices in place: glasses.  · Skin assessed under devices: yes.  · Confirmed pressure ulcers found: none.  · The following interventions in place: heels floated, patient turns self    Right ear is red and blanching.   Patient has a 1cm open hole to mid-upper chest from cyst incision.     Otherwise skin is intact.

## 2019-03-13 NOTE — ED NOTES
Patient medicated as ordered. Provided PO fluids at patients request, he became nauseous and vomited within ten minutes of drinking, PRN meds given for nausea, advised patient to not drink anymore water at this time.

## 2019-03-13 NOTE — ED NOTES
Received report from rn wILL, taking over pt care at this time. Pt sitting up comfortably watching t.v, no needs at this time. Bed in lowered position, side rails up, call light in reach

## 2019-03-13 NOTE — ED NOTES
Called lab regarding missing coag results.  informed me that the coags were added late and were not able to be ran, she then told me they were expecting another blue top but could not tell me whether or not ER was called regarding this.

## 2019-03-13 NOTE — ASSESSMENT & PLAN NOTE
Hx of Alcoholic liver disease and possible fatty liver.   - S/p esophageal variceal  Banding in 10/2017.  - S/p EGD 03/10 : large esophageal varices s/p banding of esophageal varices x 5 and gastric varices.

## 2019-03-13 NOTE — ASSESSMENT & PLAN NOTE
Patient with melena after binge drinking episode. GI recommends evaluation of patency of TIPS vasculature.    -US abdomen for TIPS vasculature investigation.

## 2019-03-13 NOTE — ASSESSMENT & PLAN NOTE
Type 2, diagnosed in 10/2017    -Hold metformin in hospital.  -Correctional insulin if glucose is consistently above 180.

## 2019-03-13 NOTE — H&P
"      Internal Medicine Admitting History and Physical    Note Author: Mark Julien M.D.     Name August Samuel     1990   Age/Sex 28 y.o. male   MRN [id number]   Code Status Full Code     After 5PM or if no immediate response to page, please call for cross-coverage  Attending/Team:  / Triston See Patient List for primary contact information  Call [phone] to page    1st Call - Day Intern (R1):   Dr. Noyola 2nd Call - Day Sr. Resident (R2/R3):   Dr. Gray     Chief Complaint:   Chief Complaint   Patient presents with   • Headache     x 2 days/ posterior headache. pt denies hx.    • Melena     Pt reports black stool x 1 today/ pt reports hx of cirrhosis. pt describes LLQ to be \"rumbly\"   • Nausea       HPI:  August Samuel is a 28 y.o. old patient with PMH of non-insulin-dependent diabetes mellitus type 2, alcoholic liver cirrhosis (status post esophageal varices with banding, gastroparesis, portal hypertensive gastropathy, TIPS procedure) with one episode of melena.  He noticed black stool today and left lower quadrant abdominal pain.  He has associated nausea and dizziness/lightheadedness however denied any episodes of vomiting, fever or chills.  He also has headache for the past 2 days which is predominantly in the occipital region.  Per patient, he stopped drinking in 2018 after having a TIPS procedure however restarted again recently and has been binge drinking on weekends.  Most recently, he binge drink on Saturday and cannot quantify the amount.  He states that he has been feeling about the same as last year when he was admitted for similar reason.   In the ED, the patient was stable. Vitals unremarkable. The patient will be admitted to the hospital for further evaluation and management of melena and alcohol withdrawal.    Review of Systems   Constitutional: Negative.    HENT: Negative.    Eyes: Negative.    Respiratory: Negative.    Cardiovascular: " Negative.    Gastrointestinal: Positive for abdominal pain, melena and nausea. Negative for blood in stool, constipation, diarrhea, heartburn and vomiting.   Genitourinary: Negative.    Musculoskeletal: Negative.    Skin: Negative.    Neurological: Positive for dizziness and headaches. Negative for seizures and loss of consciousness.   Endo/Heme/Allergies: Negative.    Psychiatric/Behavioral: Positive for substance abuse. Negative for depression and suicidal ideas.   All other systems reviewed and are negative.    Past Medical History (Chronic medical problem, known complications and current treatment)    Past Medical History:   Diagnosis Date   • Asthma    • Cirrhosis of liver (HCC)    • Diabetes (HCC)    • Esophageal varices (HCC)    • GI bleed    • Hypertension        Past Surgical History:  Past Surgical History:   Procedure Laterality Date   • GASTROSCOPY-ENDO N/A 3/10/2018    Procedure: GASTROSCOPY With ESOPHAGEAL BANDING;  Surgeon: Vincenzo Lr M.D.;  Location: SURGERY [facility];  Service: Gastroenterology   • GASTROSCOPY  10/10/2017    Procedure: EGD W/LIGATE/BAND ESOPH/VARICE [[id number]];  Surgeon: Ottoniel Avelar M.D.;  Location: SURGERY [facility];  Service: Gastroenterology       Current Outpatient Medications:  Home Medications    **Home medications have not yet been reviewed for this encounter**         Medication Allergy/Sensitivities:  No Known Allergies    Family History (mandatory)   Family History   Problem Relation Age of Onset   • Hypertension Mother    • Alcohol/Drug Father    • Diabetes Other        Social History (mandatory)   Social History     Social History   • Marital status:      Spouse name: N/A   • Number of children: N/A   • Years of education: N/A     Occupational History   • Not on file.     Social History Main Topics   • Smoking status: Never Smoker   • Smokeless tobacco: Current User     Types: Chew   • Alcohol use Yes      Comment: Binged drink last  "Saturday   • Drug use: Yes     Types: Marijuana   • Sexual activity: Not on file     Other Topics Concern   • Not on file     Social History Narrative   • No narrative on file     Living situation: Home  PCP : Tyrell Barnes M.D.    Physical Exam     Vitals:    03/12/19 1616 03/12/19 1900 03/12/19 1930 03/12/19 2000   BP: 142/97      Pulse: 78 80 97 86   Resp: 16 16 (!) 24 20   Temp: 36.8 °C (98.2 °F)      TempSrc: Temporal      SpO2:  95% 95% 95%   Weight:       Height:         Body mass index is 30.44 kg/m².  /97   Pulse 86   Temp 36.8 °C (98.2 °F) (Temporal)   Resp 20   Ht 1.803 m (5' 11\")   Wt 99 kg (218 lb 4.1 oz)   SpO2 95%   BMI 30.44 kg/m²   O2 therapy: Pulse Oximetry: 95 %    Physical Exam   Constitutional: He is oriented to person, place, and time and well-developed, well-nourished, and in no distress. No distress.   HENT:   Head: Normocephalic and atraumatic.   Eyes: Pupils are equal, round, and reactive to light. EOM are normal.   Neck: Normal range of motion. Neck supple. No thyromegaly present.   Cardiovascular: Normal rate, regular rhythm and normal heart sounds.    No murmur heard.  Pulmonary/Chest: Effort normal and breath sounds normal. No respiratory distress. He has no wheezes.   Abdominal: Soft. Bowel sounds are normal. He exhibits no distension. There is tenderness in the left lower quadrant. There is no rebound and no guarding.   Musculoskeletal: Normal range of motion. He exhibits no edema or tenderness.   Neurological: He is alert and oriented to person, place, and time.   Skin: Skin is warm and dry.   Vitals reviewed.      Data Review       Old Records Request:   Completed  Current Records review/summary: Completed    Lab Data Review:  Recent Results (from the past 24 hour(s))   CBC WITH DIFFERENTIAL    Collection Time: 03/12/19  2:44 PM   Result Value Ref Range    WBC 7.3 4.8 - 10.8 K/uL    RBC 6.02 4.70 - 6.10 M/uL    Hemoglobin 16.4 14.0 - 18.0 g/dL    Hematocrit 50.3 42.0 " - 52.0 %    MCV 83.6 81.4 - 97.8 fL    MCH 27.2 27.0 - 33.0 pg    MCHC 32.6 (L) 33.7 - 35.3 g/dL    RDW 55.8 (H) 35.9 - 50.0 fL    Platelet Count 178 164 - 446 K/uL    Neutrophils-Polys 51.60 44.00 - 72.00 %    Lymphocytes 39.40 22.00 - 41.00 %    Monocytes 5.90 0.00 - 13.40 %    Eosinophils 1.80 0.00 - 6.90 %    Basophils 1.00 0.00 - 1.80 %    Immature Granulocytes 0.30 0.00 - 0.90 %    Nucleated RBC 0.00 /100 WBC    Neutrophils (Absolute) 3.75 1.82 - 7.42 K/uL    Lymphs (Absolute) 2.86 1.00 - 4.80 K/uL    Monos (Absolute) 0.43 0.00 - 0.85 K/uL    Eos (Absolute) 0.13 0.00 - 0.51 K/uL    Baso (Absolute) 0.07 0.00 - 0.12 K/uL    Immature Granulocytes (abs) 0.02 0.00 - 0.11 K/uL    NRBC (Absolute) 0.00 K/uL    Anisocytosis 1+    COMP METABOLIC PANEL    Collection Time: 03/12/19  2:44 PM   Result Value Ref Range    Sodium 136 135 - 145 mmol/L    Potassium 3.8 3.6 - 5.5 mmol/L    Chloride 105 96 - 112 mmol/L    Co2 22 20 - 33 mmol/L    Anion Gap 9.0 0.0 - 11.9    Glucose 131 (H) 65 - 99 mg/dL    Bun 8 8 - 22 mg/dL    Creatinine 0.78 0.50 - 1.40 mg/dL    Calcium 9.8 8.5 - 10.5 mg/dL    AST(SGOT) 78 (H) 12 - 45 U/L    ALT(SGPT) 57 (H) 2 - 50 U/L    Alkaline Phosphatase 156 (H) 30 - 99 U/L    Total Bilirubin 3.8 (H) 0.1 - 1.5 mg/dL    Albumin 4.2 3.2 - 4.9 g/dL    Total Protein 8.4 (H) 6.0 - 8.2 g/dL    Globulin 4.2 (H) 1.9 - 3.5 g/dL    A-G Ratio 1.0 g/dL   LIPASE    Collection Time: 03/12/19  2:44 PM   Result Value Ref Range    Lipase 15 11 - 82 U/L   PERIPHERAL SMEAR REVIEW    Collection Time: 03/12/19  2:44 PM   Result Value Ref Range    Peripheral Smear Review see below    PLATELET ESTIMATE    Collection Time: 03/12/19  2:44 PM   Result Value Ref Range    Plt Estimation Normal    MORPHOLOGY    Collection Time: 03/12/19  2:44 PM   Result Value Ref Range    RBC Morphology Present     Large Platelets 1+     Giant Platelets 1+     Poikilocytosis 1+     Reactive Lymphocytes Few    DIFFERENTIAL COMMENT    Collection Time:  03/12/19  2:44 PM   Result Value Ref Range    Comments-Diff see below    ESTIMATED GFR    Collection Time: 03/12/19  2:44 PM   Result Value Ref Range    GFR If African American >60 >60 mL/min/1.73 m 2    GFR If Non African American >60 >60 mL/min/1.73 m 2       Imaging/Procedures Review:    Independant Imaging Review: Completed  No orders to display        EKG:   EKG Independant Review: Deferred    Records reviewed and summarized in current documentation :  Yes  [employer] teaching service handout given to patient:  Yes         Assessment/Plan     * Melena   Assessment & Plan    hda one episode of melena this morning.  Also, blood is coming out of her bellybutton.  Has a similar history of upper GI bleed back in March 2018 when he had a TIPS procedure.  Furthermore, the patient was admitted in 10/2017 due to acute GI bleeding, scope showed esophageal varices (status post banding x5), gastric varices, and portal hypertension gastritis. Ultrasound showed fatty liver.  Seen by Dr. Mendoza during last hospitalization.  -Continue PPI drip, octreotide drip and and IV fluids  -Consider gastroenterology consultation in the morning     Alcoholic cirrhosis (HCC)- (present on admission)   Assessment & Plan    Significant drinking history. Patient states he quit in October 2017 after a diagnosis of liver cirrhosis and esophageal variceal banding however he restarted again and has been binge drinking for the past several months.  Last time was on Saturday.  - Ultrasound 10/2017 showed enlarged liver, fatty infiltration and/or cirrhotic change  - EGD 10/2017 showed Esophageal Varices, Gastrica varices and portal-hypertensive gastropathy .  - EGD 03/2018 EV GV, s/p EV ligation.   - TIPS done on 03/14.   - No evidence of hepatic encephalopathy on admission  - elevated liver enzymes AST: ALT appropriately elevated secondary to alcohol intoxication     Esophageal varices with bleeding (HCC)- (present on admission)   Assessment & Plan    Hx  of Alcoholic liver disease and possible fatty liver.   - S/p esophageal variceal  Banding in 10/2017.  - S/p EGD 03/10 : large esophageal varices s/p banding of esophageal varices x 5 and gastric varices.     Diabetes mellitus (HCC)- (present on admission)   Assessment & Plan    Type 2, diagnosed in 10/2017  -Follow-up with A1c and continue metformin          Anticipated Hospital stay:  >2 midnights    Quality Measures  Quality-Core Measures   Reviewed items::  EKG reviewed, Labs reviewed, Medications reviewed and Radiology images reviewed  Larson catheter::  No Larson  DVT prophylaxis pharmacological::  Contraindicated - High bleeding risk  DVT prophylaxis - mechanical:  SCDs  Ulcer Prophylaxis::  Not indicated    PCP: ALEENA Tucker M.D.

## 2019-03-13 NOTE — PROGRESS NOTES
Internal Medicine Interval Note  Note Author: Luis A Noyola M.D.     Name August Samuel     1990   Age/Sex 28 y.o. male   MRN [id number]   Code Status FULL     After 5PM or if no immediate response to page, please call for cross-coverage  Attending/Team: Dr Fernandez / Triston See Patient List for primary contact information  Call [phone] to page    1st Call - Day Intern (R1):   Dr Noyola 2nd Call - Day Sr. Resident (R2/R3):   Dr Gray         Reason for interval visit  (Principal Problem)   Melena      Interval Problem Daily Status Update  (24 hours, problem oriented, brief subjective history, new lab/imaging data pertinent to that problem)      Admitted overnight. No acute events/dc bleeding. No BM since melena just prior to admission. Does not remember events of binge drinking episode as he blacked out. Denies trauma to belly button. No concerns currently.    -Hgb stable.  -Bleeding from bellybutton not significant.  -GI consulted. Recommend evaluation of TIPS vasculature.  -US abdomen for TIPS eval ordered.  -H/H repeat this PM.  -Transfuse if hgb drop >2 g/dL    Review of Systems   Constitutional: Negative for chills and fever.   HENT: Negative for nosebleeds and sore throat.    Eyes: Negative for blurred vision and double vision.   Respiratory: Negative for hemoptysis and shortness of breath.    Cardiovascular: Negative for chest pain and palpitations.   Gastrointestinal: Positive for melena. Negative for nausea and vomiting.   Genitourinary: Negative for dysuria and urgency.   Musculoskeletal: Negative for falls and neck pain.   Neurological: Negative for dizziness, seizures and loss of consciousness.   Endo/Heme/Allergies: Negative for polydipsia. Does not bruise/bleed easily.   Psychiatric/Behavioral: Positive for memory loss. Negative for hallucinations.       Disposition/Barriers to discharge:   To remain inpatient for further evaluation/monitoring of GI  bleeding.    Consultants/Specialty  Gasteroenterology    PCP: Tyrell Barnes M.D.      Quality Measures  Quality-Core Measures   Reviewed items::  EKG reviewed, Labs reviewed, Medications reviewed and Radiology images reviewed  Larson catheter::  No Larson  DVT prophylaxis pharmacological::  Contraindicated - High bleeding risk  DVT prophylaxis - mechanical:  SCDs  Ulcer Prophylaxis::  Yes        Physical Exam       Vitals:    03/13/19 0718 03/13/19 0800 03/13/19 0845 03/13/19 1232   BP:   144/91    Pulse: 60 65 71 88   Resp: 18 18 14 14   Temp:   36.7 °C (98 °F) 36.6 °C (97.9 °F)   TempSrc:   Temporal Temporal   SpO2: 91% 91% 100% 99%   Weight:       Height:         Body mass index is 30.44 kg/m². Weight: 99 kg (218 lb 4.1 oz)  Oxygen Therapy:  Pulse Oximetry: 99 %, O2 (LPM): 0, O2 Delivery: None (Room Air)      Physical Exam   Constitutional: He is oriented to person, place, and time and well-developed, well-nourished, and in no distress.   HENT:   Head: Normocephalic and atraumatic.   Mouth/Throat: No oropharyngeal exudate.   Eyes: Pupils are equal, round, and reactive to light. EOM are normal. No scleral icterus.   Neck: Normal range of motion. Neck supple.   Cardiovascular: Normal rate and regular rhythm.  Exam reveals no gallop and no friction rub.    No murmur heard.  Pulmonary/Chest: Effort normal and breath sounds normal. No stridor. No respiratory distress. He has no wheezes. He has no rales. He exhibits no tenderness.   Abdominal: Soft. Bowel sounds are normal. He exhibits no distension and no mass. There is no tenderness. There is no rebound and no guarding.   Serous fluid and some minimal blood at belly button. No obvious lesion.   Musculoskeletal: Normal range of motion. He exhibits no edema, tenderness or deformity.   Lymphadenopathy:     He has no cervical adenopathy.   Neurological: He is alert and oriented to person, place, and time.   Skin: Skin is warm and dry. No rash noted. He is not  diaphoretic. No erythema. No pallor.   Psychiatric: Mood, memory, affect and judgment normal.       Recent Labs      03/12/19   1444  03/13/19   0505   SODIUM  136  141   POTASSIUM  3.8  3.8   CHLORIDE  105  107   CO2  22  21   BUN  8  12   CREATININE  0.78  0.79   MAGNESIUM  1.6   --    CALCIUM  9.8  9.0       Recent Labs      03/12/19   1444  03/13/19   0505   ALTSGPT  57*  44   ASTSGOT  78*  51*   ALKPHOSPHAT  156*  139*   TBILIRUBIN  3.8*  3.1*   LIPASE  15   --    GLUCOSE  131*  87       Recent Labs      03/12/19   1444  03/13/19   0505   RBC  6.02  5.25   HEMOGLOBIN  16.4  14.2   HEMATOCRIT  50.3  44.8   PLATELETCT  178  145*       Recent Labs      03/12/19   1444  03/13/19   0505   WBC  7.3  8.2   NEUTSPOLYS  51.60  48.00   LYMPHOCYTES  39.40  42.60*   MONOCYTES  5.90  6.00   EOSINOPHILS  1.80  2.40   BASOPHILS  1.00  0.90   ASTSGOT  78*  51*   ALTSGPT  57*  44   ALKPHOSPHAT  156*  139*   TBILIRUBIN  3.8*  3.1*           Assessment/Plan     * Melena   Assessment & Plan    Likely secondary to upper GI bleed from resumption of EtOH consumption.     -Continue PPI drip, octreotide drip and and IV fluids  -GI consulted.     S/P TIPS (transjugular intrahepatic portosystemic shunt)   Assessment & Plan    Patient with melena after binge drinking episode. GI recommends evaluation of patency of TIPS vasculature.    -US abdomen for TIPS vasculature investigation.     Esophageal varices with bleeding (HCC)- (present on admission)   Assessment & Plan    Hx of Alcoholic liver disease and possible fatty liver.   - S/p esophageal variceal  Banding in 10/2017.  - S/p EGD 03/10 : large esophageal varices s/p banding of esophageal varices x 5 and gastric varices.     Alcoholic cirrhosis (HCC)- (present on admission)   Assessment & Plan    Significant drinking history. Patient states he quit in October 2017 after a diagnosis of liver cirrhosis and esophageal variceal banding s/p TIPS March 2018. However, he restarted again and  has been binge drinking for the past several months.  Last time was on Saturday.    -Ceftriaxone 1 g IV q24 hrs for 5 days for prophylaxis for GI bleed w/cirrhosis.  -Elevated liver enzymes AST: ALT appropriately elevated secondary to alcohol intoxication.     Diabetes mellitus (HCC)- (present on admission)   Assessment & Plan    Type 2, diagnosed in 10/2017    -Hold metformin in hospital.  -Correctional insulin if glucose is consistently above 180.

## 2019-03-13 NOTE — ED PROVIDER NOTES
"ED Provider Note    CHIEF COMPLAINT  Chief Complaint   Patient presents with   • Headache     x 2 days/ posterior headache. pt denies hx.    • Melena     Pt reports black stool x 1 today/ pt reports hx of cirrhosis. pt describes LLQ to be \"rumbly\"   • Nausea       HPI  August Samuel is a 28 y.o. male who presents for evaluation of several symptoms including a posterior headache as well as an episode of dark tarry stool.  He reports nausea and notes that he is a heavy drinker.  Patient does note that he also has had a history of esophageal varices due to drinking as well as portal hypertension.  He has been having oozing of apparent blood from his umbilicus which is also concerning him.    REVIEW OF SYSTEMS  Constitutional: No fevers, weakness, weight loss   Skin: No rashes, abrasions, lacerations, or pruritus  HEENT: No ear pain, ringing in ears, or decreased hearing. No sore throat, runny nose, sores, trouble swallowing, trouble speaking.  Neck: No neck pain, stiffness, or masses.  Chest: No pain or rashes  Pulm: No shortness of breath, cough, wheezing, stridor, or pain with inspiration/expiration  Gastrointestinal: No diarrhea or hematochezia.  Genitourinary: No dysuria or hematuria  Musculoskeletal: No recent trauma, pain, swelling, weakness  Neurologic: No sensory or motor changes. No confusion or disorientation.  Heme: No bleeding or bruising problems.   Immuno: No hx of recurrent infections      PAST MEDICAL HISTORY   has a past medical history of Asthma; Cirrhosis of liver (HCC); Diabetes (HCC); Esophageal varices (HCC); GI bleed; and Hypertension.    SOCIAL HISTORY  Social History     Social History Main Topics   • Smoking status: Never Smoker   • Smokeless tobacco: Current User     Types: Chew   • Alcohol use Yes      Comment: Binged drink last Saturday   • Drug use: Yes     Types: Marijuana   • Sexual activity: Not on file       SURGICAL HISTORY   has a past surgical history that includes gastroscopy " "(10/10/2017) and gastroscopy-endo (N/A, 3/10/2018).    CURRENT MEDICATIONS  Home Medications     Reviewed by Gildardo Georges (Pharmacy Tech) on 03/12/19 at 2240  Med List Status: Complete   Medication Last Dose Status   metformin (GLUCOPHAGE) 500 MG Tab 3/8/2019 Active                ALLERGIES  No Known Allergies    PHYSICAL EXAM  VITAL SIGNS: /91   Pulse 88   Temp 36.6 °C (97.9 °F) (Temporal)   Resp 14   Ht 1.803 m (5' 11\")   Wt 99 kg (218 lb 4.1 oz)   SpO2 99%   BMI 30.44 kg/m²    Gen:, Attentive, well groomed.  HEENT: No signs of trauma, Bilateral external ears normal, Nose normal. Conjunctiva normal, mildly icteric  Neck:  No tenderness, Supple, No masses  Lymphatic: No cervical lymphadenopathy noted.   Cardiovascular: Regular rate and rhythm, no murmurs.   Thorax & Lungs: Normal breath sounds, No respiratory distress, No wheezing bilateral chest rise  Abdomen: Bowel sounds normal, Soft, No significant tenderness, No masses, No pulsatile masses. No Guarding or rebound.  Small amount of dark blood within the umbilicus  Skin: Warm, Dry, No erythema, No rash.  Mildly jaundice  Back: No bony tenderness, No CVA tenderness.   Extremities: Intact distal pulses, No edema  Neurologic: Alert , no facial droop, grossly normal coordination and strength  Psychiatric: Affect normal, Judgment normal, Mood normal.       INITIAL IMPRESSION  Patient appears to have symptoms related to his chronic liver failure which has been exacerbated by recent drinking binge.  Patient does appear jaundice and will require labs including screening labs for GI bleeding.  I feel it is unnecessary to guaiac the patient's stool at this point as management will remain the same regardless.  He is at high risk for esophageal varices as well as GI erosions especially in the upper GI tract.  Will likely need to admit the patient.  We will reevaluate him after labs have returned.    LABS  Results for orders placed or performed during the " hospital encounter of 03/12/19   CBC WITH DIFFERENTIAL   Result Value Ref Range    WBC 7.3 4.8 - 10.8 K/uL    RBC 6.02 4.70 - 6.10 M/uL    Hemoglobin 16.4 14.0 - 18.0 g/dL    Hematocrit 50.3 42.0 - 52.0 %    MCV 83.6 81.4 - 97.8 fL    MCH 27.2 27.0 - 33.0 pg    MCHC 32.6 (L) 33.7 - 35.3 g/dL    RDW 55.8 (H) 35.9 - 50.0 fL    Platelet Count 178 164 - 446 K/uL    Neutrophils-Polys 51.60 44.00 - 72.00 %    Lymphocytes 39.40 22.00 - 41.00 %    Monocytes 5.90 0.00 - 13.40 %    Eosinophils 1.80 0.00 - 6.90 %    Basophils 1.00 0.00 - 1.80 %    Immature Granulocytes 0.30 0.00 - 0.90 %    Nucleated RBC 0.00 /100 WBC    Neutrophils (Absolute) 3.75 1.82 - 7.42 K/uL    Lymphs (Absolute) 2.86 1.00 - 4.80 K/uL    Monos (Absolute) 0.43 0.00 - 0.85 K/uL    Eos (Absolute) 0.13 0.00 - 0.51 K/uL    Baso (Absolute) 0.07 0.00 - 0.12 K/uL    Immature Granulocytes (abs) 0.02 0.00 - 0.11 K/uL    NRBC (Absolute) 0.00 K/uL    Anisocytosis 1+    COMP METABOLIC PANEL   Result Value Ref Range    Sodium 136 135 - 145 mmol/L    Potassium 3.8 3.6 - 5.5 mmol/L    Chloride 105 96 - 112 mmol/L    Co2 22 20 - 33 mmol/L    Anion Gap 9.0 0.0 - 11.9    Glucose 131 (H) 65 - 99 mg/dL    Bun 8 8 - 22 mg/dL    Creatinine 0.78 0.50 - 1.40 mg/dL    Calcium 9.8 8.5 - 10.5 mg/dL    AST(SGOT) 78 (H) 12 - 45 U/L    ALT(SGPT) 57 (H) 2 - 50 U/L    Alkaline Phosphatase 156 (H) 30 - 99 U/L    Total Bilirubin 3.8 (H) 0.1 - 1.5 mg/dL    Albumin 4.2 3.2 - 4.9 g/dL    Total Protein 8.4 (H) 6.0 - 8.2 g/dL    Globulin 4.2 (H) 1.9 - 3.5 g/dL    A-G Ratio 1.0 g/dL   LIPASE   Result Value Ref Range    Lipase 15 11 - 82 U/L   PERIPHERAL SMEAR REVIEW   Result Value Ref Range    Peripheral Smear Review see below    PLATELET ESTIMATE   Result Value Ref Range    Plt Estimation Normal    MORPHOLOGY   Result Value Ref Range    RBC Morphology Present     Large Platelets 1+     Giant Platelets 1+     Poikilocytosis 1+     Reactive Lymphocytes Few    DIFFERENTIAL COMMENT   Result Value  Ref Range    Comments-Diff see below    ESTIMATED GFR   Result Value Ref Range    GFR If African American >60 >60 mL/min/1.73 m 2    GFR If Non African American >60 >60 mL/min/1.73 m 2   HEMOGLOBIN A1C   Result Value Ref Range    Glycohemoglobin 6.6 (H) 0.0 - 5.6 %    Est Avg Glucose 143 mg/dL   MAGNESIUM   Result Value Ref Range    Magnesium 1.6 1.5 - 2.5 mg/dL   CBC with Differential   Result Value Ref Range    WBC 8.2 4.8 - 10.8 K/uL    RBC 5.25 4.70 - 6.10 M/uL    Hemoglobin 14.2 14.0 - 18.0 g/dL    Hematocrit 44.8 42.0 - 52.0 %    MCV 85.3 81.4 - 97.8 fL    MCH 27.0 27.0 - 33.0 pg    MCHC 31.7 (L) 33.7 - 35.3 g/dL    RDW 56.8 (H) 35.9 - 50.0 fL    Platelet Count 145 (L) 164 - 446 K/uL    MPV 12.7 9.0 - 12.9 fL    Neutrophils-Polys 48.00 44.00 - 72.00 %    Lymphocytes 42.60 (H) 22.00 - 41.00 %    Monocytes 6.00 0.00 - 13.40 %    Eosinophils 2.40 0.00 - 6.90 %    Basophils 0.90 0.00 - 1.80 %    Immature Granulocytes 0.10 0.00 - 0.90 %    Nucleated RBC 0.00 /100 WBC    Neutrophils (Absolute) 3.95 1.82 - 7.42 K/uL    Lymphs (Absolute) 3.50 1.00 - 4.80 K/uL    Monos (Absolute) 0.49 0.00 - 0.85 K/uL    Eos (Absolute) 0.20 0.00 - 0.51 K/uL    Baso (Absolute) 0.07 0.00 - 0.12 K/uL    Immature Granulocytes (abs) 0.01 0.00 - 0.11 K/uL    NRBC (Absolute) 0.00 K/uL   Comp Metabolic Panel (CMP)   Result Value Ref Range    Sodium 141 135 - 145 mmol/L    Potassium 3.8 3.6 - 5.5 mmol/L    Chloride 107 96 - 112 mmol/L    Co2 21 20 - 33 mmol/L    Anion Gap 13.0 (H) 0.0 - 11.9    Glucose 87 65 - 99 mg/dL    Bun 12 8 - 22 mg/dL    Creatinine 0.79 0.50 - 1.40 mg/dL    Calcium 9.0 8.5 - 10.5 mg/dL    AST(SGOT) 51 (H) 12 - 45 U/L    ALT(SGPT) 44 2 - 50 U/L    Alkaline Phosphatase 139 (H) 30 - 99 U/L    Total Bilirubin 3.1 (H) 0.1 - 1.5 mg/dL    Albumin 3.8 3.2 - 4.9 g/dL    Total Protein 7.1 6.0 - 8.2 g/dL    Globulin 3.3 1.9 - 3.5 g/dL    A-G Ratio 1.2 g/dL   ESTIMATED GFR   Result Value Ref Range    GFR If  >60 >60  mL/min/1.73 m 2    GFR If Non African American >60 >60 mL/min/1.73 m 2   HEMOGLOBIN AND HEMATOCRIT   Result Value Ref Range    Hemoglobin 14.3 14.0 - 18.0 g/dL    Hematocrit 44.6 42.0 - 52.0 %       Reevaluation   Time: 12 AM, March 13  Vital signs: Noted per nursing note, patient appears stable  Assessment: Calm, conversant  COURSE & MEDICAL DECISION MAKING  Pertinent Labs & Imaging studies reviewed. (See chart for details)  Patient appears to have symptoms suggestive of alcohol withdrawal in the setting of possible GI bleeding.  The bleeding is likely an upper source given the dark tarry nature of the stool however the source is unclear.  He did not become hemodynamically unstable and did not have any convincing findings to suggest delirium tremens.  Patient will be admitted to the R medicine service for further treatment and evaluation.  I do not feel emergent GI consultation is necessary given the findings however he may need this as an inpatient.  Patient was started empirically on Protonix as well as octreotide for treatment of upper GI bleeding sources that are likely given this patient's comorbidities.    FINAL IMPRESSION  1. Melena    2. Alcohol withdrawal syndrome with complication (HCC)        Electronically signed by: David Bradford, 3/12/2019 7:03 PM

## 2019-03-13 NOTE — PROGRESS NOTES
Patient arrived to floor from ED via gurney. Report received. Monitor room notified of patient arrival. Patient oriented to floor and room, discussed use of call light and plan of care.  Patient verbalized understanding. Was alert and oriented x 4.Patient without pain at this time and all concerns voiced. Will monitor for safety and comfort. Patient is sinus rhythm, rate 99

## 2019-03-13 NOTE — DISCHARGE PLANNING
Care Transition Team Assessment  Pt states that upon discharge a family member can pick him up.    Information Source  Orientation : Oriented x 4  Information Given By: Patient  Informant's Name:  (August Samuel)  Who is responsible for making decisions for patient? : Patient    Readmission Evaluation  Is this a readmission?: Yes - unplanned readmission  Why do you think you were readmitted?:  (Drinking)    Elopement Risk  Legal Hold: No  Ambulatory or Self Mobile in Wheelchair: No-Not an Elopement Risk  Elopement Risk: Not at Risk for Elopement    Interdisciplinary Discharge Planning  Does Admitting Nurse Feel This Could be a Complex Discharge?: No  Primary Care Physician:  (Tyrell Barnes MD)  Lives with - Patient's Self Care Capacity: Parents  Support Systems: Family Member(s)  Housing / Facility: [address]  Do You Take your Prescribed Medications Regularly: Yes (Pharmacy: [facility] on [address]  Able to Return to Previous ADL's: Yes  Mobility Issues: No  Patient Expects to be Discharged to::  (Home)  Assistance Needed: No  Durable Medical Equipment: Not Applicable    Discharge Preparedness  What is your plan after discharge?:  (Home)  What are your discharge supports?: Parent  Prior Functional Level: Ambulatory  Difficulity with ADLs: None  Difficulity with IADLs: None    Functional Assesment  Prior Functional Level: Ambulatory    Finances  Financial Barriers to Discharge: No  Source of Income: Employed  Prescription Coverage: Yes    Vision / Hearing Impairment  Vision Impairment : No  Hearing Impairment : No         Advance Directive  Advance Directive?: None  Advance Directive offered?: AD Booklet refused    Domestic Abuse  Have you ever been the victim of abuse or violence?: No    Psychological Assessment  History of Substance Abuse: Alcohol  Date Last Used - Alcohol:  (3/10/2019)  History of Psychiatric Problems: No    Discharge Risks or Barriers  Discharge risks or barriers?: Substance abuse  Patient risk  factors: Substance abuse    Anticipated Discharge Information  Anticipated discharge disposition: Home  Discharge Address:  ([address] [place])  Discharge Contact Phone Number:  (Noel Samuel  (Brother)  [phone])

## 2019-03-13 NOTE — CARE PLAN
Problem: Safety  Goal: Will remain free from injury    Intervention: Educate patient and significant other/support system about adaptive mobility strategies and safe transfers  Patient educated on safe mobility strategies and safe transfers while in hospital. Patient verbalized understanding

## 2019-03-13 NOTE — ASSESSMENT & PLAN NOTE
Significant drinking history. Patient states he quit in October 2017 after a diagnosis of liver cirrhosis and esophageal variceal banding s/p TIPS March 2018. However, he restarted again and has been binge drinking for the past several months.  Last time was on Saturday.    -Ceftriaxone 1 g IV q24 hrs for 5 days for prophylaxis for GI bleed w/cirrhosis.  -Elevated liver enzymes AST: ALT appropriately elevated secondary to alcohol intoxication.

## 2019-03-14 NOTE — DISCHARGE SUMMARY
Internal Medicine Discharge Summary  Note Author: Michael Gray M.D.       Admit Date:  3/12/2019       Discharge Date:   3/13/2019     Service:   [facility] Internal Medicine [place] Team  Attending Physician(s):   Dr Fernandez        Senior Resident(s):   Dr Gray  [place] Resident(s):   Dr Noyola   PCP: Tyrell Barnes M.D.      Primary Diagnosis:   Melena    Secondary Diagnoses:                Principal Problem:    Melena POA: Unknown  Active Problems:    Esophageal varices with bleeding (HCC) POA: Yes    S/P TIPS (transjugular intrahepatic portosystemic shunt) POA: Unknown    Alcoholic cirrhosis (HCC) POA: Yes    Diabetes mellitus (HCC) POA: Yes  Resolved Problems:    * No resolved hospital problems. *      Hospital Summary (Brief Narrative):       Mr Samuel is a 29 yo male with PMH of Alcoholic cirrhosis, esophageal and gastric varices s/p TIPS procedures in 03/2018 presented with one episode of melena. He was abstaining from alcohol but relapsed last weekend and was binge drinking and passed out. Does not remember how much alcohol he had. On Tuesday he noted one episode of black sticky stools and came to ED. He denied hematemesis, epigastric or lower abdominal pain, fever, bright red bleeding OK, dizziness. On admission his vitals were stable. Abdomen was soft and non tender. Rectal exam done at the ED showed dark green stools without blood, hemoccult was negative. Hb was 14 and was stable throughout hospital stay. He was admitted to telemetry floor started on ocreotide and PPI infusion. He did not have any more episodes of melena. Discussed with Dr Mendoza from [facility] and recommended to get imaging to assess patency of the shunt. US abd revealed a patent shunt, no ascites. Dr Mendoza recommended oral PPI, likely he had gastritis after alcohol use. Since the shunt is patent it is unlikely that he has variceal bleeding. Patient was discharged home with oral PPI and recommended to  follow up with PCP and [facility].     Patient /Hospital Summary (Details -- Problem Oriented) :          S/P TIPS (transjugular intrahepatic portosystemic shunt)   Assessment & Plan    Patient with melena after binge drinking episode.   -US abdomen for TIPS showed patent TIPS, unlikely variceal bleed     Esophageal varices with bleeding (HCC)   Assessment & Plan    Hx of Alcoholic liver disease and possible fatty liver.   - S/p esophageal variceal  Banding in 10/2017.  - S/p EGD 03/10 : large esophageal varices s/p banding of esophageal varices x 5 and gastric varices.  Since TIPS is patent, unlikely to re bleed since it is decompressed      * Melena   Assessment & Plan    Had only one episode   Likely secondary to upper GI bleed from resumption of EtOH consumption.   Oral PPI        Alcoholic cirrhosis (HCC)   Assessment & Plan    Significant drinking history. Patient states he quit in October 2017 after a diagnosis of liver cirrhosis and esophageal variceal banding s/p TIPS March 2018. However, he restarted again and has been binge drinking for the past several months.  Last time was on Saturday.    -Ceftriaxone 1 g IV q24 hrs for 5 days for prophylaxis for GI bleed w/cirrhosis.  -Elevated liver enzymes AST: ALT appropriately elevated secondary to alcohol intoxication.     Diabetes mellitus (HCC)   Assessment & Plan    Type 2, diagnosed in 10/2017  Continue metormin        Consultants:     GI - phone consult     Procedures:        None     Imaging/ Testing:      US-LIVER AND VESSELS LTD (TIPS) (COMBO)   Final Result      Patent TIPS.   Heterogeneous nodular liver consistent cirrhosis and splenomegaly.   Possible accessory splenic tissue within the splenic hilum.   Gallbladder wall thickening likely secondary to hepatocellular disease.   No gallstones or dilatation of the common duct.   No free fluid.            Discharge Medications:          Medication Reconciliation: Completed       Medication List       START taking these medications      Instructions   omeprazole 40 MG delayed-release capsule  Commonly known as:  PRILOSEC   Doctor's comments:  For gastritis.  Take 1 Cap by mouth every day.  Dose:  40 mg        CONTINUE taking these medications      Instructions   metFORMIN 500 MG Tabs  Commonly known as:  GLUCOPHAGE   Take 2 Tabs by mouth 2 times a day, with meals.  Dose:  1000 mg              Disposition:   Discharge to home     Diet:   Regular     Activity:   As tolerated     Instructions:      Follow up with PCP and GI    The patient was instructed to return to the ER in the event of worsening symptoms. I have counseled the patient on the importance of compliance and the patient has agreed to proceed with all medical recommendations and follow up plan indicated above.   The patient understands that all medications come with benefits and risks. Risks may include permanent injury or death and these risks can be minimized with close reassessment and monitoring.        Primary Care Provider:    Tyrell Barnes M.D.    Discharge summary faxed to primary care provider:  Deferred  Copy of discharge summary given to the patient: Deferred      Follow up appointment details :      Follow up with PCP in 1-2 weeks   Follow up with [facility] in 2-4 weeks     Pending Studies:        None     Time spent on discharge day patient visit, preparing discharge paperwork and arranging for patient follow up.  Discharge Time (Minutes) :    30 min   Hospital Course Type:  Inpatient Stay >2 midnights      Condition on Discharge    ______________________________________________________________________

## 2019-03-14 NOTE — DISCHARGE INSTRUCTIONS
Discharge Instructions    Discharged to home by car with relative. Discharged via wheelchair, hospital escort: Yes.  Special equipment needed: Not Applicable    Be sure to schedule a follow-up appointment with your primary care doctor or any specialists as instructed.     Discharge Plan:   Diet Plan: Discussed  Activity Level: Discussed  Confirmed Follow up Appointment: Patient to Call and Schedule Appointment  Confirmed Symptoms Management: Discussed  Medication Reconciliation Updated: Yes  Influenza Vaccine Indication: Not indicated: Previously immunized this influenza season and > 8 years of age    I understand that a diet low in cholesterol, fat, and sodium is recommended for good health. Unless I have been given specific instructions below for another diet, I accept this instruction as my diet prescription.   Other diet: regular    Special Instructions: None    · Is patient discharged on Warfarin / Coumadin?   No     Depression / Suicide Risk    As you are discharged from this [facility] facility, it is important to learn how to keep safe from harming yourself.    Recognize the warning signs:  · Abrupt changes in personality, positive or negative- including increase in energy   · Giving away possessions  · Change in eating patterns- significant weight changes-  positive or negative  · Change in sleeping patterns- unable to sleep or sleeping all the time   · Unwillingness or inability to communicate  · Depression  · Unusual sadness, discouragement and loneliness  · Talk of wanting to die  · Neglect of personal appearance   · Rebelliousness- reckless behavior  · Withdrawal from people/activities they love  · Confusion- inability to concentrate     If you or a loved one observes any of these behaviors or has concerns about self-harm, here's what you can do:  · Talk about it- your feelings and reasons for harming yourself  · Remove any means that you might use to hurt yourself (examples: pills, rope, extension  cords, firearm)  · Get professional help from the community (Mental Health, Substance Abuse, psychological counseling)  · Do not be alone:Call your Safe Contact- someone whom you trust who will be there for you.  · Call your local CRISIS HOTLINE 525-4571 or 348-590-9138  · Call your local Children's Mobile Crisis Response Team [place] (564) 549-0069 or www.MarketGid  · Call the toll free National Suicide Prevention Hotlines   · National Suicide Prevention Lifeline 095-525-FKEW (0610)  · National Hope Line Network 800-SUICIDE (352-3506)      Finding Treatment for Addiction  Introduction  WHAT IS ADDICTION?  Addiction is a complex disease of the brain. It causes an uncontrollable (compulsive) need for a substance. You can be addicted to alcohol, illegal drugs, or prescription medicines such as painkillers. Addiction can also be a behavior, like gambling or shopping. The need for the drug or activity can become so strong that you think about it all the time. You can also become physically dependent on a substance.  Addiction can change the way your brain works. Because of these changes, getting more of whatever you are addicted to becomes the most important thing to you and feels better than other activities or relationships. Addiction can lead to changes in health, behavior, emotions, relationships, and choices that affect you and everyone around you.  HOW DO I KNOW IF I NEED TREATMENT FOR ADDICTION?  Addiction is a progressive disease. Without treatment, addiction can get worse. Living with addiction puts you at higher risk for injury, poor health, lost employment, loss of money, and even death.  You might need treatment for addiction if:  · You have tried to stop or cut down, but you cannot.  · Your addiction is causing physical health problems.  · You find it annoying that your friends and family are concerned about your alcohol or substance use.  · You feel guilty about substance abuse or a compulsive  behavior.  · You have lied or tried to hide your addiction.  · You need a particular substance or activity to start your day or to calm down.  · You are getting in trouble at school, work, home, or with the police.  · You have done something illegal to support your addiction.  · You are running out of money because of your addiction.  · You have no time for anything other than your addiction.  WHAT TYPES OF TREATMENT ARE AVAILABLE?  The treatment program that is right for you will depend on many factors, including the type of addiction you have. Treatment programs can be outpatient or inpatient. In an outpatient program, you live at home and go to work or school, but you also go to a clinic for treatment. With an inpatient program, you live and sleep at the program facility during treatment. After treatment, you might need a plan for support during recovery. Other treatment options include:  · Medicine.  ¨ Some addictions may be treated with prescription medicines.  ¨ You might also need medicine to treat anxiety or depression.  · Counseling and behavior therapy. Therapy can help individuals and families behave in healthier ways and relate more effectively.  · Support groups. Confidential group therapy, such as a 12-step program, can help individuals and families during treatment and recovery.  No single type of program is right for everyone. Many treatment programs involve a combination of education, counseling, and a 12-step, spiritually-based approach. Some treatment programs are government sponsored. They are geared for patients who do not have private insurance. Treatment programs can vary in many respects, such as:  · Cost and types of insurance that are accepted.  · Types of on-site medical services that are offered.  · Length of stay, setting, and size.  · Overall philosophy of treatment.  WHAT SHOULD I CONSIDER WHEN SELECTING A TREATMENT PROGRAM?  It is important to think about your individual requirements  when selecting a treatment program. There are a number of things to consider, such as:  · If the program is certified by the appropriate government agency. Even private programs must be certified and employ certified professionals.  · If the program is covered by your insurance. If finances are a concern, the first call you should make is to your insurance company, if you have health insurance. Ask for a list of treatment programs that are in your network, and confirm any copayments and deductibles that you may have to pay.  ¨ If you do not have insurance, or if you choose to attend a program that does not accept your insurance, discuss whether a payment plan can be set up.  · If treatment is available in languages other than English, if needed.  · If the program offers detoxification treatment, if needed.  · If 12-step meetings are held at the center or if transport is available for patients to attend meetings at other locations.  · If the program is professional, organized, and clean.  · If the program meets all of your needs, including physical and cultural needs.  · If the facility offers specific treatment for your particular addiction.  · If support continues to be offered after you have left the program.  · If your treatment plan is continually looked at to make sure you are receiving the right treatment at the right time.  · If mental health counseling is part of your treatment.  · If medicine is included in treatment, if needed.  · If your family is included in your treatment plan and if support is offered to them throughout the treatment process.  · How the treatment works to prevent relapse.  WHERE ELSE CAN I GET HELP?  · Your health care provider. Ask him or her to help you find addiction treatment. These discussions are confidential.  · The National Augustine on Alcoholism and Drug Dependence (NCADD). This group has information about treatment centers and programs for people who have an addiction and for  family members.  ¨ The telephone number is 2-674-VBL-CALL (1-127.397.4368).  ¨ The website is https://ncTicketBase.org/about-ncadd/our-affiliates  · The Substance Abuse and Mental Health Services Administration ([facility]). This group will help you find publicly funded treatment centers, help hotlines, and counseling services near you.  ¨ The telephone number is 5-447-635-HELP (1-845.565.3227).  ¨ The website is www.findtreatment.samhsa.gov  In countries outside of the [place] and [place], look in local directories for contact information for services in your area.  This information is not intended to replace advice given to you by your health care provider. Make sure you discuss any questions you have with your health care provider.  Document Released: 11/16/2006 Document Revised: 05/25/2017 Document Reviewed: 10/06/2015  © 2017 Elsevier

## 2019-05-06 ENCOUNTER — OFFICE VISIT (OUTPATIENT)
Dept: INTERNAL MEDICINE | Facility: MEDICAL CENTER | Age: 29
End: 2019-05-06
Payer: COMMERCIAL

## 2019-05-06 VITALS
DIASTOLIC BLOOD PRESSURE: 96 MMHG | SYSTOLIC BLOOD PRESSURE: 142 MMHG | HEIGHT: 71 IN | HEART RATE: 93 BPM | WEIGHT: 223.4 LBS | BODY MASS INDEX: 31.27 KG/M2 | TEMPERATURE: 98.7 F | OXYGEN SATURATION: 95 %

## 2019-05-06 DIAGNOSIS — F32.A DEPRESSION, UNSPECIFIED DEPRESSION TYPE: ICD-10-CM

## 2019-05-06 DIAGNOSIS — E11.8 TYPE 2 DIABETES MELLITUS WITH COMPLICATION, UNSPECIFIED WHETHER LONG TERM INSULIN USE: ICD-10-CM

## 2019-05-06 DIAGNOSIS — R03.0 ELEVATED BP WITHOUT DIAGNOSIS OF HYPERTENSION: ICD-10-CM

## 2019-05-06 DIAGNOSIS — K70.30 ALCOHOLIC CIRRHOSIS OF LIVER WITHOUT ASCITES (HCC): ICD-10-CM

## 2019-05-06 DIAGNOSIS — E66.9 OBESITY (BMI 30-39.9): ICD-10-CM

## 2019-05-06 PROBLEM — R00.0 TACHYCARDIA: Status: RESOLVED | Noted: 2017-10-10 | Resolved: 2019-05-06

## 2019-05-06 PROBLEM — R79.89 LIVER FUNCTION TEST ABNORMALITY: Status: RESOLVED | Noted: 2017-10-10 | Resolved: 2019-05-06

## 2019-05-06 PROCEDURE — 99203 OFFICE O/P NEW LOW 30 MIN: CPT | Mod: GC | Performed by: INTERNAL MEDICINE

## 2019-05-06 RX ORDER — ESCITALOPRAM OXALATE 10 MG/1
10 TABLET ORAL DAILY
Qty: 90 TAB | Refills: 1 | Status: SHIPPED | OUTPATIENT
Start: 2019-05-06 | End: 2019-06-10

## 2019-05-06 ASSESSMENT — ENCOUNTER SYMPTOMS
COUGH: 0
EYE DISCHARGE: 0
WEAKNESS: 0
CHILLS: 0
SENSORY CHANGE: 0
SORE THROAT: 0
NAUSEA: 0
SHORTNESS OF BREATH: 0
ABDOMINAL PAIN: 0
PALPITATIONS: 0
HEADACHES: 0
BLURRED VISION: 0
DOUBLE VISION: 0
FEVER: 0
DEPRESSION: 1
DIZZINESS: 0
MYALGIAS: 0
WHEEZING: 0
VOMITING: 0

## 2019-05-06 ASSESSMENT — LIFESTYLE VARIABLES: SUBSTANCE_ABUSE: 1

## 2019-05-06 ASSESSMENT — PATIENT HEALTH QUESTIONNAIRE - PHQ9
CLINICAL INTERPRETATION OF PHQ2 SCORE: 6
SUM OF ALL RESPONSES TO PHQ QUESTIONS 1-9: 21
5. POOR APPETITE OR OVEREATING: 3 - NEARLY EVERY DAY

## 2019-05-06 NOTE — PATIENT INSTRUCTIONS
Depression, Adult  Depression is feeling sad, low, down in the dumps, blue, gloomy, or empty. In general, there are two kinds of depression:  · Normal sadness or grief. This can happen after something upsetting. It often goes away on its own within 2 weeks. After losing a loved one (bereavement), normal sadness and grief may last longer than two weeks. It usually gets better with time.  · Clinical depression. This kind lasts longer than normal sadness or grief. It keeps you from doing the things you normally do in life. It is often hard to function at home, work, or at school. It may affect your relationships with others. Treatment is often needed.  GET HELP RIGHT AWAY IF:  · You have thoughts about hurting yourself or others.  · You lose touch with reality (psychotic symptoms). You may:  ¨ See or hear things that are not real.  ¨ Have untrue beliefs about your life or people around you.  · Your medicine is giving you problems.  MAKE SURE YOU:  · Understand these instructions.  · Will watch your condition.  · Will get help right away if you are not doing well or get worse.     This information is not intended to replace advice given to you by your health care provider. Make sure you discuss any questions you have with your health care provider.     Document Released: 01/20/2012 Document Revised: 01/08/2016 Document Reviewed: 04/18/2013  [employer] Interactive Patient Education ©2016 [employer]

## 2019-05-06 NOTE — PROGRESS NOTES
Established Patient    August presents today with the following:    CC:   Cirrhosis, depression      HPI:   28-year-old male with a past medical history of alcoholic cirrhosis complicated with esophageal varices status post TIPS, type 2 diabetes who presents today to establish care.  Patient states that he is noticed a depressed mood over the last few months.  States that he is very stressed and feels down.  States there is no inciting event, just an accumulation of issues.  States that he has poor appetite, poor concentration, increased sleep.  States that he feels very depressed but does not have any active thoughts of suicidal ideation.  Patient has a previous suicide attempt a few years ago where he drank alcohol with a bunch of pills.  States that he lives with his mother and father and has decent social support.  Patient admits to drinking alcohol once every 1 to 2 weeks.  States that he limit himself to 1 drink.  Patient also takes metformin for his diabetes, does not check his blood sugars.      PHQ 9 of 21 in clinic today.    Patient Active Problem List    Diagnosis Date Noted   • S/P TIPS (transjugular intrahepatic portosystemic shunt) 03/13/2019     Priority: High   • Melena 03/12/2019     Priority: High   • Esophageal varices with bleeding (HCC) 10/10/2017     Priority: High   • Portal hypertension (HCC) 10/10/2017     Priority: High   • Peripheral neuropathy 03/10/2018     Priority: Medium   • Liver function test abnormality 10/10/2017     Priority: Medium   • Alcoholic cirrhosis (HCC) 10/10/2017     Priority: Medium   • Diabetes mellitus (HCC) 10/10/2017     Priority: Low   • Obesity (BMI 30-39.9) 05/06/2019   • Elevated BP without diagnosis of hypertension 05/06/2019       Current Outpatient Prescriptions   Medication Sig Dispense Refill   • escitalopram (LEXAPRO) 10 MG Tab Take 1 Tab by mouth every day. 90 Tab 1   • metformin (GLUCOPHAGE) 1000 MG tablet Take 1 Tab by mouth 2 times a day, with meals. 90  "Tab 2     No current facility-administered medications for this visit.        ROS: As per HPI. Additional pertinent symptoms as noted below.  Review of Systems   Constitutional: Negative for chills and fever.   HENT: Negative for congestion, hearing loss and sore throat.    Eyes: Negative for blurred vision, double vision and discharge.   Respiratory: Negative for cough, shortness of breath and wheezing.    Cardiovascular: Negative for chest pain, palpitations and leg swelling.   Gastrointestinal: Negative for abdominal pain, nausea and vomiting.   Genitourinary: Negative for dysuria and urgency.   Musculoskeletal: Negative for joint pain and myalgias.   Skin: Negative for itching and rash.   Neurological: Negative for dizziness, sensory change, weakness and headaches.   Psychiatric/Behavioral: Positive for depression and substance abuse.       Physical Exam    /96 (BP Location: Right arm, Patient Position: Sitting)   Pulse 93   Temp 37.1 °C (98.7 °F) (Temporal)   Ht 1.803 m (5' 11\")   Wt 101.3 kg (223 lb 6.4 oz)   SpO2 95%   BMI 31.16 kg/m²     Physical Exam   Constitutional: He is oriented to person, place, and time and well-developed, well-nourished, and in no distress.   HENT:   Head: Normocephalic and atraumatic.   Mouth/Throat: No oropharyngeal exudate.   Eyes: Pupils are equal, round, and reactive to light. Conjunctivae are normal. No scleral icterus.   Neck: Normal range of motion. Neck supple. No JVD present. No thyromegaly present.   Cardiovascular: Normal rate, regular rhythm and normal heart sounds.    No murmur heard.  Pulmonary/Chest: Effort normal and breath sounds normal. No respiratory distress. He has no wheezes.   Abdominal: Soft. Bowel sounds are normal. He exhibits no distension. There is no tenderness. There is no rebound.   Musculoskeletal: Normal range of motion. He exhibits no edema.   Neurological: He is alert and oriented to person, place, and time. No cranial nerve deficit. "   Skin: No rash noted. No erythema.   Psychiatric: He exhibits a depressed mood.         Assessment and Plan    1. Type 2 diabetes mellitus with complication, unspecified whether long term insulin use (HCC)  -Patient has history of diabetes, fairly well controlled  -A1c 6.6 in March 2019  -Continue metformin, educated not to drink alcohol while taking medication  -Recheck labs including urine microalbumin when necessary      2. Alcoholic cirrhosis of liver without ascites (HCC)  - Patient has history of alcoholic cirrhosis, esophageal varices, status post TIPS procedure  -Educated to abstain from alcohol use  -Child Giles class B7  -Will refer patient to gastroenterology for close follow-up, serial EGDs and ultrasounds  - REFERRAL TO GASTROENTEROLOGY      3. Depression, unspecified depression type  -Patient presents with depressed mood, present for many months  -PHQ 9 of 21  -Will refer patient urgently to psychiatry/psychology for further assistance with management, will start patient on Lexapro at this time, educated to titrate dose up after 2 to 3 weeks  - REFERRAL TO PSYCHIATRY  - REFERRAL TO PSYCHOLOGY  - escitalopram (LEXAPRO) 10 MG Tab; Take 1 Tab by mouth every day.  Dispense: 90 Tab; Refill: 1      4. Elevated BP without diagnosis of hypertension  -Blood pressure of 142/96 in clinic today  -Educated regarding lifestyle and dietary modification, check blood pressure at home and bring log to next visit  -We will repeat blood pressure in 5 weeks      5. Obesity (BMI 30-39.9)  -BMI 31.1 in clinic today  - Patient identified as having weight management issue.  Appropriate orders and counseling given.        Follow up: Return in about 5 weeks (around 6/10/2019).      Signed by: Gavino Kwok M.D.

## 2019-06-10 ENCOUNTER — OFFICE VISIT (OUTPATIENT)
Dept: INTERNAL MEDICINE | Facility: MEDICAL CENTER | Age: 29
End: 2019-06-10
Payer: COMMERCIAL

## 2019-06-10 VITALS
BODY MASS INDEX: 31.22 KG/M2 | DIASTOLIC BLOOD PRESSURE: 78 MMHG | HEIGHT: 71 IN | TEMPERATURE: 97.3 F | SYSTOLIC BLOOD PRESSURE: 102 MMHG | HEART RATE: 84 BPM | WEIGHT: 223 LBS | OXYGEN SATURATION: 95 %

## 2019-06-10 DIAGNOSIS — E11.8 TYPE 2 DIABETES MELLITUS WITH COMPLICATION, UNSPECIFIED WHETHER LONG TERM INSULIN USE: ICD-10-CM

## 2019-06-10 DIAGNOSIS — F32.9 MAJOR DEPRESSIVE DISORDER, REMISSION STATUS UNSPECIFIED, UNSPECIFIED WHETHER RECURRENT: ICD-10-CM

## 2019-06-10 DIAGNOSIS — K70.30 ALCOHOLIC CIRRHOSIS OF LIVER WITHOUT ASCITES (HCC): ICD-10-CM

## 2019-06-10 DIAGNOSIS — E66.9 OBESITY (BMI 30-39.9): ICD-10-CM

## 2019-06-10 PROBLEM — K92.1 MELENA: Status: RESOLVED | Noted: 2019-03-12 | Resolved: 2019-06-10

## 2019-06-10 PROCEDURE — 99214 OFFICE O/P EST MOD 30 MIN: CPT | Mod: GC | Performed by: INTERNAL MEDICINE

## 2019-06-10 RX ORDER — SERTRALINE HYDROCHLORIDE 100 MG/1
TABLET, FILM COATED ORAL
Refills: 1 | COMMUNITY
Start: 2019-06-04

## 2019-06-10 ASSESSMENT — ENCOUNTER SYMPTOMS
ABDOMINAL PAIN: 0
SENSORY CHANGE: 0
NAUSEA: 0
SPEECH CHANGE: 0
DIZZINESS: 0
DEPRESSION: 1
BLURRED VISION: 0
COUGH: 0
MYALGIAS: 0
SORE THROAT: 0
DOUBLE VISION: 0
WHEEZING: 0
VOMITING: 0
PALPITATIONS: 0
CHILLS: 0
EYE REDNESS: 0
FEVER: 0
WEAKNESS: 0
SHORTNESS OF BREATH: 0

## 2019-06-10 ASSESSMENT — LIFESTYLE VARIABLES: SUBSTANCE_ABUSE: 0

## 2019-06-10 NOTE — PATIENT INSTRUCTIONS
Diabetes Mellitus and Food  It is important for you to manage your blood sugar (glucose) level. Your blood glucose level can be greatly affected by what you eat. Eating healthier foods in the appropriate amounts throughout the day at about the same time each day will help you control your blood glucose level. It can also help slow or prevent worsening of your diabetes mellitus. Healthy eating may even help you improve the level of your blood pressure and reach or maintain a healthy weight.  General recommendations for healthful eating and cooking habits include:  · Eating meals and snacks regularly. Avoid going long periods of time without eating to lose weight.  · Eating a diet that consists mainly of plant-based foods, such as fruits, vegetables, nuts, legumes, and whole grains.  · Using low-heat cooking methods, such as baking, instead of high-heat cooking methods, such as deep frying.  Work with your dietitian to make sure you understand how to use the Nutrition Facts information on food labels.  How can food affect me?  Carbohydrates   Carbohydrates affect your blood glucose level more than any other type of food. Your dietitian will help you determine how many carbohydrates to eat at each meal and teach you how to count carbohydrates. Counting carbohydrates is important to keep your blood glucose at a healthy level, especially if you are using insulin or taking certain medicines for diabetes mellitus.  Alcohol   Alcohol can cause sudden decreases in blood glucose (hypoglycemia), especially if you use insulin or take certain medicines for diabetes mellitus. Hypoglycemia can be a life-threatening condition. Symptoms of hypoglycemia (sleepiness, dizziness, and disorientation) are similar to symptoms of having too much alcohol.  If your health care provider has given you approval to drink alcohol, do so in moderation and use the following guidelines:  · Women should not have more than one drink per day, and men  should not have more than two drinks per day. One drink is equal to:  ¨ 12 oz of beer.  ¨ 5 oz of wine.  ¨ 1½ oz of hard liquor.  · Do not drink on an empty stomach.  · Keep yourself hydrated. Have water, diet soda, or unsweetened iced tea.  · Regular soda, juice, and other mixers might contain a lot of carbohydrates and should be counted.  What foods are not recommended?  As you make food choices, it is important to remember that all foods are not the same. Some foods have fewer nutrients per serving than other foods, even though they might have the same number of calories or carbohydrates. It is difficult to get your body what it needs when you eat foods with fewer nutrients. Examples of foods that you should avoid that are high in calories and carbohydrates but low in nutrients include:  · Trans fats (most processed foods list trans fats on the Nutrition Facts label).  · Regular soda.  · Juice.  · Candy.  · Sweets, such as cake, pie, doughnuts, and cookies.  · Fried foods.  What foods can I eat?  Eat nutrient-rich foods, which will nourish your body and keep you healthy. The food you should eat also will depend on several factors, including:  · The calories you need.  · The medicines you take.  · Your weight.  · Your blood glucose level.  · Your blood pressure level.  · Your cholesterol level.  You should eat a variety of foods, including:  · Protein.  ¨ Lean cuts of meat.  ¨ Proteins low in saturated fats, such as fish, egg whites, and beans. Avoid processed meats.  · Fruits and vegetables.  ¨ Fruits and vegetables that may help control blood glucose levels, such as apples, mangoes, and yams.  · Dairy products.  ¨ Choose fat-free or low-fat dairy products, such as milk, yogurt, and cheese.  · Grains, bread, pasta, and rice.  ¨ Choose whole grain products, such as multigrain bread, whole oats, and brown rice. These foods may help control blood pressure.  · Fats.  ¨ Foods containing healthful fats, such as nuts,  avocado, olive oil, canola oil, and fish.  Does everyone with diabetes mellitus have the same meal plan?  Because every person with diabetes mellitus is different, there is not one meal plan that works for everyone. It is very important that you meet with a dietitian who will help you create a meal plan that is just right for you.  This information is not intended to replace advice given to you by your health care provider. Make sure you discuss any questions you have with your health care provider.  Document Released: 09/14/2006 Document Revised: 05/25/2017 Document Reviewed: 11/14/2014  [employer] Interactive Patient Education © 2017 [employer]

## 2019-06-10 NOTE — PROGRESS NOTES
Established Patient    August presents today with the following:    CC:   Follow-up for depression, liver disease      HPI:  28-year-old male with past medical history of alcoholic cirrhosis complicated with esophageal varices status post TIPS, type 2 diabetes who presents today for a follow-up appointment.  Patient states he is doing well since last visit with no acute complaints.  Patient states he saw psychiatry who changed him from Lexapro to Zoloft.  States that he has been on Zoloft for about 1 week and has noticed some associated fatigue.  States that overall his mood is improved he has had improved sleep.  States that he has not consumed alcohol since his last appointment.  Patient states that he is going to Alcoholics Anonymous that is helping out significantly.  Patient is unable to see GI at this time as he has lost paperwork.       Patient Active Problem List    Diagnosis Date Noted   • S/P TIPS (transjugular intrahepatic portosystemic shunt) 03/13/2019     Priority: High   • Esophageal varices with bleeding (HCC) 10/10/2017     Priority: High   • Portal hypertension (HCC) 10/10/2017     Priority: High   • Peripheral neuropathy 03/10/2018     Priority: Medium   • Alcoholic cirrhosis (HCC) 10/10/2017     Priority: Medium   • Diabetes mellitus (HCC) 10/10/2017     Priority: Low   • Major depressive disorder 06/10/2019   • Obesity (BMI 30-39.9) 05/06/2019   • Elevated BP without diagnosis of hypertension 05/06/2019       Current Outpatient Prescriptions   Medication Sig Dispense Refill   • sertraline (ZOLOFT) 100 MG Tab TAKE 1 2 (ONE HALF) TABLET BY MOUTH ONCE DAILY FOR 7 DAYS THEN INCREASE TO 1 TABLET BY MOUTH ONCE DAILY  1   • metformin (GLUCOPHAGE) 1000 MG tablet Take 1 Tab by mouth 2 times a day, with meals. 90 Tab 2     No current facility-administered medications for this visit.          ROS: As per HPI. Additional pertinent symptoms as noted below.  Review of Systems   Constitutional: Negative for  "chills and fever.   HENT: Negative for hearing loss, sore throat and tinnitus.    Eyes: Negative for blurred vision, double vision and redness.   Respiratory: Negative for cough, shortness of breath and wheezing.    Cardiovascular: Negative for chest pain, palpitations and leg swelling.   Gastrointestinal: Negative for abdominal pain, nausea and vomiting.   Genitourinary: Negative for dysuria and urgency.   Musculoskeletal: Negative for joint pain and myalgias.   Skin: Negative for itching and rash.   Neurological: Negative for dizziness, sensory change, speech change and weakness.   Psychiatric/Behavioral: Positive for depression (Improving). Negative for substance abuse.         Physical Exam    /78 (BP Location: Left arm, Patient Position: Sitting, BP Cuff Size: Large adult)   Pulse 84   Temp 36.3 °C (97.3 °F)   Ht 1.803 m (5' 11\")   Wt 101.2 kg (223 lb)   SpO2 95%   BMI 31.10 kg/m²       Physical Exam   Constitutional: He is oriented to person, place, and time and well-developed, well-nourished, and in no distress.   HENT:   Head: Normocephalic and atraumatic.   Mouth/Throat: No oropharyngeal exudate.   Eyes: Pupils are equal, round, and reactive to light. Conjunctivae are normal. No scleral icterus.   Neck: Normal range of motion. Neck supple. No JVD present. No thyromegaly present.   Cardiovascular: Normal rate and regular rhythm.    No murmur heard.  Pulmonary/Chest: Effort normal and breath sounds normal. No respiratory distress. He has no wheezes.   Abdominal: Soft. Bowel sounds are normal. He exhibits no distension. There is no tenderness. There is no rebound.   Musculoskeletal: Normal range of motion. He exhibits no edema.   Neurological: He is alert and oriented to person, place, and time. No cranial nerve deficit.   Skin: No rash noted. No erythema.   Psychiatric: Affect normal.         Assessment and Plan    1. Alcoholic cirrhosis of liver without ascites (HCC)  - Patient has history of " alcoholic cirrhosis, esophageal varices, status post TIPS procedure  -Educated to abstain from alcohol use, currently sober for 2 months  -Child [employer] class B7  -Continue to attend Alcoholics Anonymous as needed  -Patient referred to GI, accepted, currently pending scheduling of appointment, referral information reprinted for patient who states he will schedule an appointment      2. Type 2 diabetes mellitus with complication, unspecified whether long term insulin use (HCC)  -Patient has a history of diabetes, fairly well controlled  -A1c of 6.6 in March 2019  -Continue lifestyle and dietary modification  -Continue metformin, educated not to drink alcohol while taking this medication given risk of lactic acidosis  -We will recheck labs including A1c, urine protein to creatinine ratio next visit      3. Obesity (BMI 30-39.9)  -BMI 31.1 in clinic today  -Educated regarding lifestyle and dietary modification      4. Major depressive disorder, remission status unspecified, unspecified whether recurrent  -Patient newly diagnosed with depression, improving  -States mood, sleep has improved since starting pharmacotherapy and going to Alcoholics Anonymous  -Continue Zoloft as prescribed by psychiatry  -Psychiatry/psychology consulted, appreciate recommendations        Follow up: Return in about 3 months (around 9/10/2019).      Signed by: Gavino Kwok M.D.

## 2020-09-05 NOTE — PROGRESS NOTES
"Subjective:      August Samuel is a 27 y.o. male who presents with Leg Problem (x1wk has been feeling numbness in both legs/bursts of sharp pain)            HPI    ROS       Objective:     BP (!) 168/94   Pulse (!) 120   Temp 36.6 °C (97.9 °F)   Resp 18   Ht 1.803 m (5' 11\")   Wt 113.4 kg (250 lb)   SpO2 98%   BMI 34.87 kg/m²      Physical Exam            Assessment/Plan:     There are no diagnoses linked to this encounter.      " Pt admitted from ER s/p fall at home, SDH/SAH.  VSS, neuro status stable.  CTM.

## 2021-02-03 ENCOUNTER — HOSPITAL ENCOUNTER (OUTPATIENT)
Dept: LAB | Facility: MEDICAL CENTER | Age: 31
End: 2021-02-03
Attending: FAMILY MEDICINE
Payer: COMMERCIAL

## 2021-02-03 LAB
BASOPHILS # BLD AUTO: 0.4 % (ref 0–1.8)
BASOPHILS # BLD: 0.03 K/UL (ref 0–0.12)
EOSINOPHIL # BLD AUTO: 0.18 K/UL (ref 0–0.51)
EOSINOPHIL NFR BLD: 2.5 % (ref 0–6.9)
ERYTHROCYTE [DISTWIDTH] IN BLOOD BY AUTOMATED COUNT: 49.7 FL (ref 35.9–50)
HCT VFR BLD AUTO: 55 % (ref 42–52)
HGB BLD-MCNC: 18.5 G/DL (ref 14–18)
IMM GRANULOCYTES # BLD AUTO: 0.02 K/UL (ref 0–0.11)
IMM GRANULOCYTES NFR BLD AUTO: 0.3 % (ref 0–0.9)
LYMPHOCYTES # BLD AUTO: 2.8 K/UL (ref 1–4.8)
LYMPHOCYTES NFR BLD: 38.4 % (ref 22–41)
MCH RBC QN AUTO: 31.9 PG (ref 27–33)
MCHC RBC AUTO-ENTMCNC: 33.6 G/DL (ref 33.7–35.3)
MCV RBC AUTO: 94.8 FL (ref 81.4–97.8)
MONOCYTES # BLD AUTO: 0.34 K/UL (ref 0–0.85)
MONOCYTES NFR BLD AUTO: 4.7 % (ref 0–13.4)
NEUTROPHILS # BLD AUTO: 3.92 K/UL (ref 1.82–7.42)
NEUTROPHILS NFR BLD: 53.7 % (ref 44–72)
NRBC # BLD AUTO: 0 K/UL
NRBC BLD-RTO: 0 /100 WBC
PLATELET # BLD AUTO: 141 K/UL (ref 164–446)
PMV BLD AUTO: 13.2 FL (ref 9–12.9)
RBC # BLD AUTO: 5.8 M/UL (ref 4.7–6.1)
WBC # BLD AUTO: 7.3 K/UL (ref 4.8–10.8)

## 2021-02-03 PROCEDURE — 85025 COMPLETE CBC W/AUTO DIFF WBC: CPT

## 2021-02-03 PROCEDURE — 80053 COMPREHEN METABOLIC PANEL: CPT

## 2021-02-03 PROCEDURE — 83036 HEMOGLOBIN GLYCOSYLATED A1C: CPT

## 2021-02-03 PROCEDURE — 84439 ASSAY OF FREE THYROXINE: CPT

## 2021-02-03 PROCEDURE — 84443 ASSAY THYROID STIM HORMONE: CPT

## 2021-02-03 PROCEDURE — 36415 COLL VENOUS BLD VENIPUNCTURE: CPT

## 2021-02-04 LAB
ALBUMIN SERPL BCP-MCNC: 4.6 G/DL (ref 3.2–4.9)
ALBUMIN/GLOB SERPL: 1.3 G/DL
ALP SERPL-CCNC: 103 U/L (ref 30–99)
ALT SERPL-CCNC: 46 U/L (ref 2–50)
ANION GAP SERPL CALC-SCNC: 8 MMOL/L (ref 7–16)
AST SERPL-CCNC: 35 U/L (ref 12–45)
BILIRUB SERPL-MCNC: 1.4 MG/DL (ref 0.1–1.5)
BUN SERPL-MCNC: 11 MG/DL (ref 8–22)
CALCIUM SERPL-MCNC: 9.7 MG/DL (ref 8.5–10.5)
CHLORIDE SERPL-SCNC: 101 MMOL/L (ref 96–112)
CO2 SERPL-SCNC: 26 MMOL/L (ref 20–33)
CREAT SERPL-MCNC: 0.69 MG/DL (ref 0.5–1.4)
EST. AVERAGE GLUCOSE BLD GHB EST-MCNC: 140 MG/DL
GLOBULIN SER CALC-MCNC: 3.5 G/DL (ref 1.9–3.5)
GLUCOSE SERPL-MCNC: 95 MG/DL (ref 65–99)
HBA1C MFR BLD: 6.5 % (ref 0–5.6)
POTASSIUM SERPL-SCNC: 4.1 MMOL/L (ref 3.6–5.5)
PROT SERPL-MCNC: 8.1 G/DL (ref 6–8.2)
SODIUM SERPL-SCNC: 135 MMOL/L (ref 135–145)
T4 FREE SERPL-MCNC: 1.3 NG/DL (ref 0.93–1.7)
TSH SERPL DL<=0.005 MIU/L-ACNC: 1.4 UIU/ML (ref 0.38–5.33)

## 2021-06-18 ENCOUNTER — HOSPITAL ENCOUNTER (OUTPATIENT)
Dept: LAB | Facility: MEDICAL CENTER | Age: 31
End: 2021-06-18
Attending: FAMILY MEDICINE
Payer: COMMERCIAL

## 2021-06-18 LAB
ALBUMIN SERPL BCP-MCNC: 4.3 G/DL (ref 3.2–4.9)
ALBUMIN/GLOB SERPL: 1.5 G/DL
ALP SERPL-CCNC: 109 U/L (ref 30–99)
ALT SERPL-CCNC: 80 U/L (ref 2–50)
ANION GAP SERPL CALC-SCNC: 10 MMOL/L (ref 7–16)
AST SERPL-CCNC: 60 U/L (ref 12–45)
BASOPHILS # BLD AUTO: 0.8 % (ref 0–1.8)
BASOPHILS # BLD: 0.07 K/UL (ref 0–0.12)
BILIRUB SERPL-MCNC: 1.2 MG/DL (ref 0.1–1.5)
BUN SERPL-MCNC: 12 MG/DL (ref 8–22)
CALCIUM SERPL-MCNC: 9.3 MG/DL (ref 8.5–10.5)
CHLORIDE SERPL-SCNC: 111 MMOL/L (ref 96–112)
CHOLEST SERPL-MCNC: 137 MG/DL (ref 100–199)
CO2 SERPL-SCNC: 20 MMOL/L (ref 20–33)
CREAT SERPL-MCNC: 0.86 MG/DL (ref 0.5–1.4)
CREAT UR-MCNC: 117.18 MG/DL
EOSINOPHIL # BLD AUTO: 0.21 K/UL (ref 0–0.51)
EOSINOPHIL NFR BLD: 2.4 % (ref 0–6.9)
ERYTHROCYTE [DISTWIDTH] IN BLOOD BY AUTOMATED COUNT: 49.1 FL (ref 35.9–50)
FASTING STATUS PATIENT QL REPORTED: NORMAL
GLOBULIN SER CALC-MCNC: 2.9 G/DL (ref 1.9–3.5)
GLUCOSE SERPL-MCNC: 79 MG/DL (ref 65–99)
HCT VFR BLD AUTO: 47.4 % (ref 42–52)
HDLC SERPL-MCNC: 33 MG/DL
HGB BLD-MCNC: 16.3 G/DL (ref 14–18)
IMM GRANULOCYTES # BLD AUTO: 0.03 K/UL (ref 0–0.11)
IMM GRANULOCYTES NFR BLD AUTO: 0.3 % (ref 0–0.9)
LDLC SERPL CALC-MCNC: 90 MG/DL
LYMPHOCYTES # BLD AUTO: 3.28 K/UL (ref 1–4.8)
LYMPHOCYTES NFR BLD: 37.8 % (ref 22–41)
MCH RBC QN AUTO: 32 PG (ref 27–33)
MCHC RBC AUTO-ENTMCNC: 34.4 G/DL (ref 33.7–35.3)
MCV RBC AUTO: 93.1 FL (ref 81.4–97.8)
MICROALBUMIN UR-MCNC: <1.2 MG/DL
MICROALBUMIN/CREAT UR: NORMAL MG/G (ref 0–30)
MONOCYTES # BLD AUTO: 0.4 K/UL (ref 0–0.85)
MONOCYTES NFR BLD AUTO: 4.6 % (ref 0–13.4)
NEUTROPHILS # BLD AUTO: 4.68 K/UL (ref 1.82–7.42)
NEUTROPHILS NFR BLD: 54.1 % (ref 44–72)
NRBC # BLD AUTO: 0 K/UL
NRBC BLD-RTO: 0 /100 WBC
PLATELET # BLD AUTO: 156 K/UL (ref 164–446)
PMV BLD AUTO: 12.9 FL (ref 9–12.9)
POTASSIUM SERPL-SCNC: 3.9 MMOL/L (ref 3.6–5.5)
PROT SERPL-MCNC: 7.2 G/DL (ref 6–8.2)
RBC # BLD AUTO: 5.09 M/UL (ref 4.7–6.1)
SODIUM SERPL-SCNC: 141 MMOL/L (ref 135–145)
TRIGL SERPL-MCNC: 68 MG/DL (ref 0–149)
WBC # BLD AUTO: 8.7 K/UL (ref 4.8–10.8)

## 2021-06-18 PROCEDURE — 80053 COMPREHEN METABOLIC PANEL: CPT

## 2021-06-18 PROCEDURE — 85025 COMPLETE CBC W/AUTO DIFF WBC: CPT

## 2021-06-18 PROCEDURE — 82570 ASSAY OF URINE CREATININE: CPT

## 2021-06-18 PROCEDURE — 36415 COLL VENOUS BLD VENIPUNCTURE: CPT

## 2021-06-18 PROCEDURE — 82043 UR ALBUMIN QUANTITATIVE: CPT

## 2021-06-18 PROCEDURE — 80061 LIPID PANEL: CPT

## 2021-09-28 ENCOUNTER — HOSPITAL ENCOUNTER (OUTPATIENT)
Dept: LAB | Facility: MEDICAL CENTER | Age: 31
End: 2021-09-28
Attending: FAMILY MEDICINE
Payer: COMMERCIAL

## 2021-09-28 LAB
ALBUMIN SERPL BCP-MCNC: 4.4 G/DL (ref 3.2–4.9)
ALBUMIN/GLOB SERPL: 1.4 G/DL
ALP SERPL-CCNC: 99 U/L (ref 30–99)
ALT SERPL-CCNC: 84 U/L (ref 2–50)
ANION GAP SERPL CALC-SCNC: 9 MMOL/L (ref 7–16)
AST SERPL-CCNC: 70 U/L (ref 12–45)
BASOPHILS # BLD AUTO: 0.8 % (ref 0–1.8)
BASOPHILS # BLD: 0.07 K/UL (ref 0–0.12)
BILIRUB SERPL-MCNC: 1.2 MG/DL (ref 0.1–1.5)
BUN SERPL-MCNC: 9 MG/DL (ref 8–22)
CALCIUM SERPL-MCNC: 10.1 MG/DL (ref 8.5–10.5)
CHLORIDE SERPL-SCNC: 103 MMOL/L (ref 96–112)
CO2 SERPL-SCNC: 28 MMOL/L (ref 20–33)
CREAT SERPL-MCNC: 0.58 MG/DL (ref 0.5–1.4)
EOSINOPHIL # BLD AUTO: 0.24 K/UL (ref 0–0.51)
EOSINOPHIL NFR BLD: 2.9 % (ref 0–6.9)
ERYTHROCYTE [DISTWIDTH] IN BLOOD BY AUTOMATED COUNT: 49.6 FL (ref 35.9–50)
EST. AVERAGE GLUCOSE BLD GHB EST-MCNC: 111 MG/DL
GLOBULIN SER CALC-MCNC: 3.1 G/DL (ref 1.9–3.5)
GLUCOSE SERPL-MCNC: 96 MG/DL (ref 65–99)
HBA1C MFR BLD: 5.5 % (ref 4–5.6)
HCT VFR BLD AUTO: 51 % (ref 42–52)
HGB BLD-MCNC: 17.3 G/DL (ref 14–18)
IMM GRANULOCYTES # BLD AUTO: 0.02 K/UL (ref 0–0.11)
IMM GRANULOCYTES NFR BLD AUTO: 0.2 % (ref 0–0.9)
LYMPHOCYTES # BLD AUTO: 2.92 K/UL (ref 1–4.8)
LYMPHOCYTES NFR BLD: 35.1 % (ref 22–41)
MCH RBC QN AUTO: 32.4 PG (ref 27–33)
MCHC RBC AUTO-ENTMCNC: 33.9 G/DL (ref 33.7–35.3)
MCV RBC AUTO: 95.5 FL (ref 81.4–97.8)
MONOCYTES # BLD AUTO: 0.48 K/UL (ref 0–0.85)
MONOCYTES NFR BLD AUTO: 5.8 % (ref 0–13.4)
NEUTROPHILS # BLD AUTO: 4.6 K/UL (ref 1.82–7.42)
NEUTROPHILS NFR BLD: 55.2 % (ref 44–72)
NRBC # BLD AUTO: 0 K/UL
NRBC BLD-RTO: 0 /100 WBC
PLATELET # BLD AUTO: 155 K/UL (ref 164–446)
PMV BLD AUTO: 12.6 FL (ref 9–12.9)
POTASSIUM SERPL-SCNC: 4.8 MMOL/L (ref 3.6–5.5)
PROT SERPL-MCNC: 7.5 G/DL (ref 6–8.2)
RBC # BLD AUTO: 5.34 M/UL (ref 4.7–6.1)
SODIUM SERPL-SCNC: 140 MMOL/L (ref 135–145)
WBC # BLD AUTO: 8.3 K/UL (ref 4.8–10.8)

## 2021-09-28 PROCEDURE — 80053 COMPREHEN METABOLIC PANEL: CPT

## 2021-09-28 PROCEDURE — 83036 HEMOGLOBIN GLYCOSYLATED A1C: CPT

## 2021-09-28 PROCEDURE — 36415 COLL VENOUS BLD VENIPUNCTURE: CPT

## 2021-09-28 PROCEDURE — 85025 COMPLETE CBC W/AUTO DIFF WBC: CPT

## 2021-10-13 ENCOUNTER — APPOINTMENT (RX ONLY)
Dept: URBAN - METROPOLITAN AREA CLINIC 31 | Facility: CLINIC | Age: 31
Setting detail: DERMATOLOGY
End: 2021-10-13

## 2021-10-13 DIAGNOSIS — L30.9 DERMATITIS, UNSPECIFIED: ICD-10-CM

## 2021-10-13 PROCEDURE — 11104 PUNCH BX SKIN SINGLE LESION: CPT

## 2021-10-13 PROCEDURE — ? BIOPSY BY PUNCH METHOD

## 2021-10-13 ASSESSMENT — LOCATION SIMPLE DESCRIPTION DERM: LOCATION SIMPLE: LEFT FOREARM

## 2021-10-13 ASSESSMENT — LOCATION DETAILED DESCRIPTION DERM: LOCATION DETAILED: LEFT VENTRAL PROXIMAL FOREARM

## 2021-10-13 ASSESSMENT — LOCATION ZONE DERM: LOCATION ZONE: ARM

## 2021-10-13 NOTE — PROCEDURE: BIOPSY BY PUNCH METHOD
Detail Level: Detailed
Was A Bandage Applied: Yes
Punch Size In Mm: 4
Biopsy Type: H and E
Anesthesia Type: 1% lidocaine with epinephrine and a 1:10 solution of 8.4% sodium bicarbonate
Anesthesia Volume In Cc: 0.5
Additional Anesthesia Volume In Cc (Will Not Render If 0): 0
Hemostasis: None
Epidermal Sutures: 4-0 Surgipro
Number Of Epidermal Sutures (Optional): 2
Wound Care: Petrolatum
Dressing: bandage
Suture Removal: 14 days
Patient Will Remove Sutures At Home?: No
Lab: [id number]
Lab Facility: 
Consent: Written consent was obtained and risks were reviewed including but not limited to scarring, infection, bleeding, scabbing, incomplete removal, nerve damage and allergy to anesthesia.
Post-Care Instructions: I reviewed with the patient in detail post-care instructions. Patient is to keep the biopsy site dry overnight, and then apply bacitracin twice daily until healed. Patient may apply hydrogen peroxide soaks to remove any crusting.
Home Suture Removal Text: Patient was provided a home suture removal kit and will remove their sutures at home.  If they have any questions or difficulties they will call the office.
Notification Instructions: Patient will be notified of biopsy results. However, patient instructed to call the office if not contacted within 2 weeks.
Billing Type: Third-Party Bill
Information: Selecting Yes will display possible errors in your note based on the variables you have selected. This validation is only offered as a suggestion for you. PLEASE NOTE THAT THE VALIDATION TEXT WILL BE REMOVED WHEN YOU FINALIZE YOUR NOTE. IF YOU WANT TO FAX A PRELIMINARY NOTE YOU WILL NEED TO TOGGLE THIS TO 'NO' IF YOU DO NOT WANT IT IN YOUR FAXED NOTE.

## 2021-10-19 ENCOUNTER — APPOINTMENT (RX ONLY)
Dept: URBAN - METROPOLITAN AREA CLINIC 36 | Facility: CLINIC | Age: 31
Setting detail: DERMATOLOGY
End: 2021-10-19

## 2021-10-19 DIAGNOSIS — L92.0 GRANULOMA ANNULARE: ICD-10-CM

## 2021-10-19 PROCEDURE — ? PRESCRIPTION

## 2021-10-19 RX ORDER — CLOBETASOL PROPIONATE 0.5 MG/G
1 CREAM TOPICAL QD
Qty: 45 | Refills: 3 | Status: ERX | COMMUNITY
Start: 2021-10-19

## 2021-10-19 RX ADMIN — CLOBETASOL PROPIONATE 1: 0.5 CREAM TOPICAL at 00:00

## 2021-10-28 ENCOUNTER — APPOINTMENT (RX ONLY)
Dept: URBAN - METROPOLITAN AREA CLINIC 36 | Facility: CLINIC | Age: 31
Setting detail: DERMATOLOGY
End: 2021-10-28

## 2021-10-28 DIAGNOSIS — Z48.02 ENCOUNTER FOR REMOVAL OF SUTURES: ICD-10-CM

## 2021-10-28 PROCEDURE — ? SUTURE REMOVAL (GLOBAL PERIOD)

## 2021-10-28 ASSESSMENT — LOCATION ZONE DERM: LOCATION ZONE: ARM

## 2021-10-28 ASSESSMENT — LOCATION DETAILED DESCRIPTION DERM: LOCATION DETAILED: LEFT VENTRAL DISTAL FOREARM

## 2021-10-28 ASSESSMENT — LOCATION SIMPLE DESCRIPTION DERM: LOCATION SIMPLE: LEFT FOREARM

## 2021-10-28 NOTE — PROCEDURE: SUTURE REMOVAL (GLOBAL PERIOD)
Detail Level: Detailed
Add 40736 Cpt? (Important Note: In 2017 The Use Of 91216 Is Being Tracked By Cms To Determine Future Global Period Reimbursement For Global Periods): no

## 2022-06-06 ENCOUNTER — HOSPITAL ENCOUNTER (OUTPATIENT)
Dept: LAB | Facility: MEDICAL CENTER | Age: 32
End: 2022-06-06
Attending: FAMILY MEDICINE
Payer: COMMERCIAL

## 2022-06-06 LAB
ALBUMIN SERPL BCP-MCNC: 4.3 G/DL (ref 3.2–4.9)
ALBUMIN/GLOB SERPL: 1.3 G/DL
ALP SERPL-CCNC: 107 U/L (ref 30–99)
ALT SERPL-CCNC: 48 U/L (ref 2–50)
ANION GAP SERPL CALC-SCNC: 11 MMOL/L (ref 7–16)
AST SERPL-CCNC: 33 U/L (ref 12–45)
BASOPHILS # BLD AUTO: 0.5 % (ref 0–1.8)
BASOPHILS # BLD: 0.04 K/UL (ref 0–0.12)
BILIRUB SERPL-MCNC: 0.5 MG/DL (ref 0.1–1.5)
BUN SERPL-MCNC: 13 MG/DL (ref 8–22)
CALCIUM SERPL-MCNC: 9.2 MG/DL (ref 8.5–10.5)
CHLORIDE SERPL-SCNC: 108 MMOL/L (ref 96–112)
CO2 SERPL-SCNC: 20 MMOL/L (ref 20–33)
CREAT SERPL-MCNC: 0.77 MG/DL (ref 0.5–1.4)
EOSINOPHIL # BLD AUTO: 0.41 K/UL (ref 0–0.51)
EOSINOPHIL NFR BLD: 5.6 % (ref 0–6.9)
ERYTHROCYTE [DISTWIDTH] IN BLOOD BY AUTOMATED COUNT: 47.4 FL (ref 35.9–50)
EST. AVERAGE GLUCOSE BLD GHB EST-MCNC: 131 MG/DL
GFR SERPLBLD CREATININE-BSD FMLA CKD-EPI: 123 ML/MIN/1.73 M 2
GGT SERPL-CCNC: 149 U/L (ref 7–51)
GLOBULIN SER CALC-MCNC: 3.3 G/DL (ref 1.9–3.5)
GLUCOSE SERPL-MCNC: 158 MG/DL (ref 65–99)
HBA1C MFR BLD: 6.2 % (ref 4–5.6)
HCT VFR BLD AUTO: 49.8 % (ref 42–52)
HGB BLD-MCNC: 17.2 G/DL (ref 14–18)
IMM GRANULOCYTES # BLD AUTO: 0.03 K/UL (ref 0–0.11)
IMM GRANULOCYTES NFR BLD AUTO: 0.4 % (ref 0–0.9)
LYMPHOCYTES # BLD AUTO: 2.96 K/UL (ref 1–4.8)
LYMPHOCYTES NFR BLD: 40.4 % (ref 22–41)
MCH RBC QN AUTO: 32.5 PG (ref 27–33)
MCHC RBC AUTO-ENTMCNC: 34.5 G/DL (ref 33.7–35.3)
MCV RBC AUTO: 94 FL (ref 81.4–97.8)
MONOCYTES # BLD AUTO: 0.43 K/UL (ref 0–0.85)
MONOCYTES NFR BLD AUTO: 5.9 % (ref 0–13.4)
NEUTROPHILS # BLD AUTO: 3.45 K/UL (ref 1.82–7.42)
NEUTROPHILS NFR BLD: 47.2 % (ref 44–72)
NRBC # BLD AUTO: 0 K/UL
NRBC BLD-RTO: 0 /100 WBC
PLATELET # BLD AUTO: 122 K/UL (ref 164–446)
PMV BLD AUTO: 12.9 FL (ref 9–12.9)
POTASSIUM SERPL-SCNC: 3.7 MMOL/L (ref 3.6–5.5)
PROT SERPL-MCNC: 7.6 G/DL (ref 6–8.2)
RBC # BLD AUTO: 5.3 M/UL (ref 4.7–6.1)
SODIUM SERPL-SCNC: 139 MMOL/L (ref 135–145)
WBC # BLD AUTO: 7.3 K/UL (ref 4.8–10.8)

## 2022-06-06 PROCEDURE — 36415 COLL VENOUS BLD VENIPUNCTURE: CPT

## 2022-06-06 PROCEDURE — 82977 ASSAY OF GGT: CPT

## 2022-06-06 PROCEDURE — 83036 HEMOGLOBIN GLYCOSYLATED A1C: CPT

## 2022-06-06 PROCEDURE — 85025 COMPLETE CBC W/AUTO DIFF WBC: CPT

## 2022-06-06 PROCEDURE — 80053 COMPREHEN METABOLIC PANEL: CPT

## 2023-04-21 ENCOUNTER — HOSPITAL ENCOUNTER (OUTPATIENT)
Dept: LAB | Facility: MEDICAL CENTER | Age: 33
End: 2023-04-21
Attending: FAMILY MEDICINE
Payer: COMMERCIAL

## 2023-04-21 LAB
ALBUMIN SERPL BCP-MCNC: 4.3 G/DL (ref 3.2–4.9)
ALBUMIN/GLOB SERPL: 1.3 G/DL
ALP SERPL-CCNC: 78 U/L (ref 30–99)
ALT SERPL-CCNC: 63 U/L (ref 2–50)
ANION GAP SERPL CALC-SCNC: 14 MMOL/L (ref 7–16)
AST SERPL-CCNC: 46 U/L (ref 12–45)
BASOPHILS # BLD AUTO: 0.8 % (ref 0–1.8)
BASOPHILS # BLD: 0.06 K/UL (ref 0–0.12)
BILIRUB SERPL-MCNC: 1 MG/DL (ref 0.1–1.5)
BUN SERPL-MCNC: 17 MG/DL (ref 8–22)
CALCIUM ALBUM COR SERPL-MCNC: 9.6 MG/DL (ref 8.5–10.5)
CALCIUM SERPL-MCNC: 9.8 MG/DL (ref 8.5–10.5)
CHLORIDE SERPL-SCNC: 102 MMOL/L (ref 96–112)
CHOLEST SERPL-MCNC: 158 MG/DL (ref 100–199)
CO2 SERPL-SCNC: 25 MMOL/L (ref 20–33)
CREAT SERPL-MCNC: 0.78 MG/DL (ref 0.5–1.4)
CREAT UR-MCNC: 122.92 MG/DL
EOSINOPHIL # BLD AUTO: 0.44 K/UL (ref 0–0.51)
EOSINOPHIL NFR BLD: 5.5 % (ref 0–6.9)
ERYTHROCYTE [DISTWIDTH] IN BLOOD BY AUTOMATED COUNT: 43.5 FL (ref 35.9–50)
FASTING STATUS PATIENT QL REPORTED: NORMAL
GFR SERPLBLD CREATININE-BSD FMLA CKD-EPI: 122 ML/MIN/1.73 M 2
GLOBULIN SER CALC-MCNC: 3.4 G/DL (ref 1.9–3.5)
GLUCOSE SERPL-MCNC: 132 MG/DL (ref 65–99)
HCT VFR BLD AUTO: 51.8 % (ref 42–52)
HDLC SERPL-MCNC: 29 MG/DL
HGB BLD-MCNC: 17.7 G/DL (ref 14–18)
IMM GRANULOCYTES # BLD AUTO: 0.03 K/UL (ref 0–0.11)
IMM GRANULOCYTES NFR BLD AUTO: 0.4 % (ref 0–0.9)
LDLC SERPL CALC-MCNC: 94 MG/DL
LYMPHOCYTES # BLD AUTO: 3.68 K/UL (ref 1–4.8)
LYMPHOCYTES NFR BLD: 46.3 % (ref 22–41)
MCH RBC QN AUTO: 31.5 PG (ref 27–33)
MCHC RBC AUTO-ENTMCNC: 34.2 G/DL (ref 33.7–35.3)
MCV RBC AUTO: 92.2 FL (ref 81.4–97.8)
MICROALBUMIN UR-MCNC: <1.2 MG/DL
MICROALBUMIN/CREAT UR: NORMAL MG/G (ref 0–30)
MONOCYTES # BLD AUTO: 0.44 K/UL (ref 0–0.85)
MONOCYTES NFR BLD AUTO: 5.5 % (ref 0–13.4)
NEUTROPHILS # BLD AUTO: 3.29 K/UL (ref 1.82–7.42)
NEUTROPHILS NFR BLD: 41.5 % (ref 44–72)
NRBC # BLD AUTO: 0 K/UL
NRBC BLD-RTO: 0 /100 WBC
PLATELET # BLD AUTO: 148 K/UL (ref 164–446)
PMV BLD AUTO: 13 FL (ref 9–12.9)
POTASSIUM SERPL-SCNC: 3.7 MMOL/L (ref 3.6–5.5)
PROT SERPL-MCNC: 7.7 G/DL (ref 6–8.2)
RBC # BLD AUTO: 5.62 M/UL (ref 4.7–6.1)
SODIUM SERPL-SCNC: 141 MMOL/L (ref 135–145)
TRIGL SERPL-MCNC: 174 MG/DL (ref 0–149)
WBC # BLD AUTO: 7.9 K/UL (ref 4.8–10.8)

## 2023-04-21 PROCEDURE — 82570 ASSAY OF URINE CREATININE: CPT

## 2023-04-21 PROCEDURE — 80061 LIPID PANEL: CPT

## 2023-04-21 PROCEDURE — 36415 COLL VENOUS BLD VENIPUNCTURE: CPT

## 2023-04-21 PROCEDURE — 80053 COMPREHEN METABOLIC PANEL: CPT

## 2023-04-21 PROCEDURE — 82043 UR ALBUMIN QUANTITATIVE: CPT

## 2023-04-21 PROCEDURE — 85025 COMPLETE CBC W/AUTO DIFF WBC: CPT

## 2025-08-15 ENCOUNTER — OFFICE VISIT (OUTPATIENT)
Dept: URGENT CARE | Facility: PHYSICIAN GROUP | Age: 35
End: 2025-08-15
Payer: COMMERCIAL

## 2025-08-15 VITALS
HEIGHT: 71 IN | HEART RATE: 83 BPM | BODY MASS INDEX: 29.39 KG/M2 | TEMPERATURE: 97.2 F | WEIGHT: 209.9 LBS | DIASTOLIC BLOOD PRESSURE: 76 MMHG | SYSTOLIC BLOOD PRESSURE: 102 MMHG | OXYGEN SATURATION: 97 % | RESPIRATION RATE: 16 BRPM

## 2025-08-15 DIAGNOSIS — R51.9 ACUTE NONINTRACTABLE HEADACHE, UNSPECIFIED HEADACHE TYPE: Primary | ICD-10-CM

## 2025-08-15 PROCEDURE — 3078F DIAST BP <80 MM HG: CPT | Performed by: PHYSICIAN ASSISTANT

## 2025-08-15 PROCEDURE — 3074F SYST BP LT 130 MM HG: CPT | Performed by: PHYSICIAN ASSISTANT

## 2025-08-15 PROCEDURE — 99203 OFFICE O/P NEW LOW 30 MIN: CPT | Performed by: PHYSICIAN ASSISTANT

## 2025-08-15 ASSESSMENT — LIFESTYLE VARIABLES
HOW OFTEN DO YOU HAVE SIX OR MORE DRINKS ON ONE OCCASION: NEVER
SKIP TO QUESTIONS 9-10: 1
HOW MANY STANDARD DRINKS CONTAINING ALCOHOL DO YOU HAVE ON A TYPICAL DAY: PATIENT DOES NOT DRINK
HOW OFTEN DO YOU HAVE A DRINK CONTAINING ALCOHOL: NEVER
AUDIT-C TOTAL SCORE: 0

## (undated) DEVICE — KIT ANESTHESIA W/CIRCUIT & 3/LT BAG W/FILTER (20EA/CA)

## (undated) DEVICE — TUBE CONNECTING SUCTION - CLEAR PLASTIC STERILE 72 IN (50EA/CA)

## (undated) DEVICE — TUBE E-T HI-LO CUFF 7.5MM (10EA/PK)

## (undated) DEVICE — BITE BLOCK ADULT 60FR (100EA/CA)

## (undated) DEVICE — SUCTION INSTRUMENT YANKAUER BULBOUS TIP W/O VENT (50EA/CA)

## (undated) DEVICE — FILM CASSETTE ENDO

## (undated) DEVICE — TUBE E-T HI-LO CUFF 8.0MM (10EA/PK)

## (undated) DEVICE — FORCEP RADIAL JAW 4 STANDARD CAPACITY W/NEEDLE 240CM (40EA/BX)

## (undated) DEVICE — GOWN SURGEONS X-LARGE - DISP. (30/CA)

## (undated) DEVICE — KIT CUSTOM PROCEDURE SINGLE FOR ENDO  (15/CA)

## (undated) DEVICE — SPEEDBAND SUPERVIEW SUPER 7 (4/BX)

## (undated) DEVICE — BITEBLOCK ENDOSCOPIC PEDI. - (25/BX)